# Patient Record
Sex: FEMALE | Race: WHITE | NOT HISPANIC OR LATINO | Employment: FULL TIME | URBAN - METROPOLITAN AREA
[De-identification: names, ages, dates, MRNs, and addresses within clinical notes are randomized per-mention and may not be internally consistent; named-entity substitution may affect disease eponyms.]

---

## 2020-09-23 ENCOUNTER — OFFICE VISIT (OUTPATIENT)
Dept: FAMILY MEDICINE | Facility: CLINIC | Age: 50
End: 2020-09-23
Payer: COMMERCIAL

## 2020-09-23 VITALS
DIASTOLIC BLOOD PRESSURE: 82 MMHG | WEIGHT: 165 LBS | RESPIRATION RATE: 16 BRPM | HEART RATE: 68 BPM | HEIGHT: 65 IN | BODY MASS INDEX: 27.49 KG/M2 | SYSTOLIC BLOOD PRESSURE: 128 MMHG | TEMPERATURE: 97.1 F

## 2020-09-23 DIAGNOSIS — Z13.6 CARDIOVASCULAR SCREENING; LDL GOAL LESS THAN 130: ICD-10-CM

## 2020-09-23 DIAGNOSIS — F32.0 MILD MAJOR DEPRESSION (H): ICD-10-CM

## 2020-09-23 DIAGNOSIS — N89.8 VAGINAL DISCHARGE: ICD-10-CM

## 2020-09-23 DIAGNOSIS — B96.89 BACTERIAL VAGINOSIS: ICD-10-CM

## 2020-09-23 DIAGNOSIS — N76.0 BACTERIAL VAGINOSIS: ICD-10-CM

## 2020-09-23 DIAGNOSIS — G25.81 RESTLESS LEG SYNDROME: ICD-10-CM

## 2020-09-23 DIAGNOSIS — Z00.00 ROUTINE GENERAL MEDICAL EXAMINATION AT A HEALTH CARE FACILITY: Primary | ICD-10-CM

## 2020-09-23 DIAGNOSIS — B00.1 RECURRENT COLD SORES: ICD-10-CM

## 2020-09-23 DIAGNOSIS — F41.9 ANXIETY: ICD-10-CM

## 2020-09-23 DIAGNOSIS — Z12.31 ENCOUNTER FOR SCREENING MAMMOGRAM FOR BREAST CANCER: ICD-10-CM

## 2020-09-23 DIAGNOSIS — N95.2 ATROPHIC VAGINITIS: ICD-10-CM

## 2020-09-23 LAB
CHOLEST SERPL-MCNC: 238 MG/DL
GLUCOSE SERPL-MCNC: 109 MG/DL (ref 70–99)
HDLC SERPL-MCNC: 53 MG/DL
LDLC SERPL CALC-MCNC: 144 MG/DL
NONHDLC SERPL-MCNC: 185 MG/DL
SPECIMEN SOURCE: ABNORMAL
TRIGL SERPL-MCNC: 205 MG/DL
TSH SERPL DL<=0.005 MIU/L-ACNC: 2.4 MU/L (ref 0.4–4)
WET PREP SPEC: ABNORMAL

## 2020-09-23 PROCEDURE — 99386 PREV VISIT NEW AGE 40-64: CPT | Performed by: NURSE PRACTITIONER

## 2020-09-23 PROCEDURE — 84443 ASSAY THYROID STIM HORMONE: CPT | Performed by: NURSE PRACTITIONER

## 2020-09-23 PROCEDURE — 87624 HPV HI-RISK TYP POOLED RSLT: CPT | Performed by: NURSE PRACTITIONER

## 2020-09-23 PROCEDURE — 82947 ASSAY GLUCOSE BLOOD QUANT: CPT | Performed by: NURSE PRACTITIONER

## 2020-09-23 PROCEDURE — 80061 LIPID PANEL: CPT | Performed by: NURSE PRACTITIONER

## 2020-09-23 PROCEDURE — 87210 SMEAR WET MOUNT SALINE/INK: CPT | Performed by: NURSE PRACTITIONER

## 2020-09-23 PROCEDURE — 36415 COLL VENOUS BLD VENIPUNCTURE: CPT | Performed by: NURSE PRACTITIONER

## 2020-09-23 PROCEDURE — G0145 SCR C/V CYTO,THINLAYER,RESCR: HCPCS | Performed by: NURSE PRACTITIONER

## 2020-09-23 PROCEDURE — 99213 OFFICE O/P EST LOW 20 MIN: CPT | Mod: 25 | Performed by: NURSE PRACTITIONER

## 2020-09-23 RX ORDER — VALACYCLOVIR HYDROCHLORIDE 1 G/1
2 TABLET, FILM COATED ORAL
COMMUNITY
Start: 2019-10-11 | End: 2020-09-23

## 2020-09-23 RX ORDER — CYCLOBENZAPRINE HCL 10 MG
10 TABLET ORAL
COMMUNITY
Start: 2019-10-31 | End: 2020-09-23

## 2020-09-23 RX ORDER — VALACYCLOVIR HYDROCHLORIDE 1 G/1
2000 TABLET, FILM COATED ORAL 2 TIMES DAILY
Qty: 8 TABLET | Refills: 3 | Status: SHIPPED | OUTPATIENT
Start: 2020-09-23 | End: 2021-01-27

## 2020-09-23 RX ORDER — HYDROXYZINE HYDROCHLORIDE 25 MG/1
25 TABLET, FILM COATED ORAL 3 TIMES DAILY PRN
Qty: 30 TABLET | Refills: 1 | Status: SHIPPED | OUTPATIENT
Start: 2020-09-23 | End: 2021-01-27

## 2020-09-23 RX ORDER — ESTRADIOL 0.1 MG/G
1 CREAM VAGINAL
Qty: 42.5 G | Refills: 1 | Status: SHIPPED | OUTPATIENT
Start: 2020-09-24 | End: 2021-10-08

## 2020-09-23 RX ORDER — FLUOXETINE 10 MG/1
CAPSULE ORAL
Qty: 60 CAPSULE | Refills: 3 | Status: SHIPPED | OUTPATIENT
Start: 2020-09-23 | End: 2021-01-27

## 2020-09-23 RX ORDER — VALACYCLOVIR HYDROCHLORIDE 1 G/1
2000 TABLET, FILM COATED ORAL 2 TIMES DAILY
Qty: 4 TABLET | Refills: 0 | Status: SHIPPED | OUTPATIENT
Start: 2020-09-23 | End: 2020-09-23

## 2020-09-23 RX ORDER — VALACYCLOVIR HYDROCHLORIDE 1 G/1
2 TABLET, FILM COATED ORAL
Status: CANCELLED | OUTPATIENT
Start: 2020-09-23

## 2020-09-23 RX ORDER — METRONIDAZOLE 7.5 MG/G
1 GEL VAGINAL DAILY
Qty: 70 G | Refills: 0 | Status: SHIPPED | OUTPATIENT
Start: 2020-09-23 | End: 2021-01-27

## 2020-09-23 RX ORDER — CYCLOBENZAPRINE HCL 10 MG
10 TABLET ORAL 3 TIMES DAILY PRN
Qty: 30 TABLET | Refills: 11 | Status: SHIPPED | OUTPATIENT
Start: 2020-09-23 | End: 2021-10-06

## 2020-09-23 ASSESSMENT — ENCOUNTER SYMPTOMS
DYSURIA: 0
FEVER: 0
JOINT SWELLING: 0
SHORTNESS OF BREATH: 0
HEARTBURN: 0
CONSTIPATION: 0
HEADACHES: 1
NERVOUS/ANXIOUS: 1
HEMATOCHEZIA: 0
BREAST MASS: 0
EYE PAIN: 0
WEAKNESS: 0
ABDOMINAL PAIN: 0
PARESTHESIAS: 1
FREQUENCY: 1
DIARRHEA: 0
MYALGIAS: 0
SORE THROAT: 0
NAUSEA: 0
DIZZINESS: 0
HEMATURIA: 0
PALPITATIONS: 0
CHILLS: 0
COUGH: 0
ARTHRALGIAS: 0

## 2020-09-23 ASSESSMENT — ANXIETY QUESTIONNAIRES
7. FEELING AFRAID AS IF SOMETHING AWFUL MIGHT HAPPEN: NOT AT ALL
2. NOT BEING ABLE TO STOP OR CONTROL WORRYING: NEARLY EVERY DAY
1. FEELING NERVOUS, ANXIOUS, OR ON EDGE: MORE THAN HALF THE DAYS
3. WORRYING TOO MUCH ABOUT DIFFERENT THINGS: MORE THAN HALF THE DAYS
5. BEING SO RESTLESS THAT IT IS HARD TO SIT STILL: NOT AT ALL
6. BECOMING EASILY ANNOYED OR IRRITABLE: NOT AT ALL
GAD7 TOTAL SCORE: 7

## 2020-09-23 ASSESSMENT — PATIENT HEALTH QUESTIONNAIRE - PHQ9
10. IF YOU CHECKED OFF ANY PROBLEMS, HOW DIFFICULT HAVE THESE PROBLEMS MADE IT FOR YOU TO DO YOUR WORK, TAKE CARE OF THINGS AT HOME, OR GET ALONG WITH OTHER PEOPLE: SOMEWHAT DIFFICULT
5. POOR APPETITE OR OVEREATING: NOT AT ALL
SUM OF ALL RESPONSES TO PHQ QUESTIONS 1-9: 11
SUM OF ALL RESPONSES TO PHQ QUESTIONS 1-9: 11

## 2020-09-23 ASSESSMENT — MIFFLIN-ST. JEOR: SCORE: 1364.32

## 2020-09-23 NOTE — LETTER
October 1, 2020      Sharron Shipley  [address] [address]        Dear ,    This letter is regarding your recent Pap smear and Human Papillomavirus (HPV) test.    Your results showed a normal Pap smear and HPV positive.     About 80 percent of women have been exposed to HPV throughout their lifetime. There is no medication for the treatment of HPV. Typically, your own immune system gets rid of the virus before it does harm. HPV is spread by direct skin-to-skin contact, including sexual intercourse, oral sex, anal sex, or any other contact involving the genital area (example: hand to genital contact). It is not possible to become infected with HPV by touching an object, such as a toilet seat. Most people who are infected with HPV have no signs or symptoms.    Things that you can do to boost your immune system and help your body get rid of HPV: get plenty of rest, eat a well-balanced diet of healthy foods, stop smoking, exercise regularly and decrease stress.    It is recommended that you have your next Pap smear and Human Papillomavirus (HPV) test in 1 year.    If you have additional questions regarding this result, please contact our Registered Nurse, Sarahi, at [phone].      Sincerely,    Your [facility] Care Team

## 2020-09-23 NOTE — PROGRESS NOTES
SUBJECTIVE:   CC: Sharron Shipley is an 50 year old woman who presents for preventive health visit.       Patient has been advised of split billing requirements and indicates understanding: Yes  Healthy Habits:     Getting at least 3 servings of Calcium per day:  NO    Bi-annual eye exam:  Yes    Dental care twice a year:  Yes    Sleep apnea or symptoms of sleep apnea:  None    Diet:  Regular (no restrictions)    Frequency of exercise:  None    Taking medications regularly:  Yes    Medication side effects:  Not applicable    PHQ-2 Total Score: 4    Additional concerns today:  No    Restless Legs - Using Flexeril for this. Has tired other medications that have not helped.     Cold Sore - Taking Valtrex for this. Only taking when cold sore is coming.     Vaginal discharge and dryness  Self treats for yeast infections  No pelvic pain but dryness present  No period for at least a year  Uses lubricant with intercourse  Odor present    Abnormal Mood Symptoms      Duration: 5 months     Description:  Depression: YES  Anxiety: YES  Panic attacks: no     Accompanying signs and symptoms: see PHQ-9 and CRYSTAL scores - crying a lot, unable to control emotions. Not eating well     History (similar episodes/previous evaluation): None    Precipitating or alleviating factors: Sons girlfriend took grand kids away     Therapies tried and outcome: none       Today's PHQ-2 Score:   PHQ-2 ( 1999 Pfizer) 9/23/2020   Q1: Little interest or pleasure in doing things 1   Q2: Feeling down, depressed or hopeless 3   PHQ-2 Score 4   Q1: Little interest or pleasure in doing things Several days   Q2: Feeling down, depressed or hopeless Nearly every day   PHQ-2 Score 4       Abuse: Current or Past (Physical, Sexual or Emotional) - No  Do you feel safe in your environment? Yes        Social History     Tobacco Use     Smoking status: Never Smoker     Smokeless tobacco: Never Used   Substance Use Topics     Alcohol use: Yes     Comment: rare  "        Alcohol Use 9/23/2020   Prescreen: >3 drinks/day or >7 drinks/week? No       Reviewed orders with patient.  Reviewed health maintenance and updated orders accordingly - Yes  Labs reviewed in [facility]    Mammogram Screening: Patient over age 50, mutual decision to screen reflected in health maintenance.    Pertinent mammograms are reviewed under the imaging tab.  History of abnormal Pap smear: NO - age 30-65 PAP every 5 years with negative HPV co-testing recommended     Reviewed and updated as needed this visit by clinical staff  Tobacco  Allergies  Meds  Problems  Med Hx  Surg Hx  Fam Hx  Soc Hx          Reviewed and updated as needed this visit by Provider  Tobacco  Allergies  Meds  Problems  Med Hx  Surg Hx  Fam Hx            Review of Systems   Constitutional: Negative for chills and fever.   HENT: Negative for congestion, ear pain, hearing loss and sore throat.    Eyes: Positive for visual disturbance. Negative for pain.   Respiratory: Negative for cough and shortness of breath.    Cardiovascular: Negative for chest pain, palpitations and peripheral edema.   Gastrointestinal: Negative for abdominal pain, constipation, diarrhea, heartburn, hematochezia and nausea.   Breasts:  Negative for tenderness, breast mass and discharge.   Genitourinary: Positive for frequency and vaginal discharge. Negative for dysuria, genital sores, hematuria, pelvic pain, urgency and vaginal bleeding.   Musculoskeletal: Negative for arthralgias, joint swelling and myalgias.   Skin: Positive for rash.   Neurological: Positive for headaches and paresthesias. Negative for dizziness and weakness.   Psychiatric/Behavioral: Positive for mood changes. The patient is nervous/anxious.         OBJECTIVE:   /82 (Cuff Size: Adult Regular)   Pulse 68   Temp 97.1  F (36.2  C) (Tympanic)   Resp 16   Ht 1.651 m (5' 5\")   Wt 74.8 kg (165 lb)   BMI 27.46 kg/m    Physical Exam  GENERAL APPEARANCE: healthy, alert and no " distress  EYES: Eyes grossly normal to inspection, PERRL and conjunctivae and sclerae normal  HENT: ear canals and TM's normal, nose and mouth without ulcers or lesions, oropharynx clear and oral mucous membranes moist  NECK: no adenopathy, no asymmetry, masses, or scars and thyroid normal to palpation  RESP: lungs clear to auscultation - no rales, rhonchi or wheezes  BREAST: normal without masses, tenderness or nipple discharge and no palpable axillary masses or adenopathy  CV: regular rate and rhythm, normal S1 S2, no S3 or S4, no murmur, click or rub, no peripheral edema and peripheral pulses strong  ABDOMEN: soft, nontender, no hepatosplenomegaly, no masses and bowel sounds normal   (female): normal female external genitalia, normal urethral meatus, vaginal mucosal atrophy noted, normal cervix, adnexae, and uterus without masses, scant milky discharge  MS: no musculoskeletal defects are noted and gait is age appropriate without ataxia  SKIN: no suspicious lesions or rashes  NEURO: Normal strength and tone, sensory exam grossly normal, mentation intact and speech normal  PSYCH: mentation appears normal and affect normal/bright    Diagnostic Test Results:  Labs reviewed in Epic    ASSESSMENT/PLAN:   1. Routine general medical examination at a health care facility    - GLUCOSE  - Pap imaged thin layer screen with HPV - recommended age 30 - 65  - HPV High Risk Types DNA Cervical  - TSH with free T4 reflex    2. Mild major depression (H)  Not controlled.  Will start fluoxetine for symptoms.  Potential side effects discussed including but not limited to increased risk of self harm or suicide.  Sharron verbalized understanding of risks and will follow up with any worsening symptoms.  - FLUoxetine (PROZAC) 10 MG capsule; Take 10 mg daily for 1-2 weeks then increase to 20 mg daily  Dispense: 60 capsule; Refill: 3    3. Anxiety  Not controlled. Per above. Hydroxyzine as needed for anxiety.  - FLUoxetine (PROZAC) 10 MG  "capsule; Take 10 mg daily for 1-2 weeks then increase to 20 mg daily  Dispense: 60 capsule; Refill: 3  - hydrOXYzine (ATARAX) 25 MG tablet; Take 1 tablet (25 mg) by mouth 3 times daily as needed for anxiety  Dispense: 30 tablet; Refill: 1    4. Recurrent cold sores  Valtrex refilled.   - valACYclovir (VALTREX) 1000 mg tablet; Take 2 tablets (2,000 mg) by mouth 2 times daily  Dispense: 8 tablet; Refill: 3    5. Restless leg syndrome  Stable with occasional Flexeril.   - cyclobenzaprine (FLEXERIL) 10 MG tablet; Take 1 tablet (10 mg) by mouth 3 times daily as needed for muscle spasms  Dispense: 30 tablet; Refill: 11    6. Atrophic vaginitis  Would like to treat atrophic vaginitis. Will start estradiol cream.  Risks of medication discussed including but not limited to increased risk of female cancers with use.  Sharron verbalized understanding.   - estradiol (ESTRACE) 0.1 MG/GM vaginal cream; Place 1 g vaginally twice a week  Dispense: 42.5 g; Refill: 1    7. Bacterial vaginosis  Metrogel sent to the pharmacy for BV.  Symptomatic care and follow up discussed.  - metroNIDAZOLE (METROGEL) 0.75 % vaginal gel; Place 1 applicator (5 g) vaginally daily  Dispense: 70 g; Refill: 0    8. Encounter for screening mammogram for breast cancer    - *MA Screening Digital Bilateral; Future    9. Vaginal discharge    - Wet prep    10. CARDIOVASCULAR SCREENING; LDL GOAL LESS THAN 130    - Lipid panel reflex to direct LDL Fasting    Patient has been advised of split billing requirements and indicates understanding: Yes  COUNSELING:  Reviewed preventive health counseling, as reflected in patient instructions       Regular exercise       Healthy diet/nutrition    Estimated body mass index is 27.46 kg/m  as calculated from the following:    Height as of this encounter: 1.651 m (5' 5\").    Weight as of this encounter: 74.8 kg (165 lb).      She reports that she has never smoked. She has never used smokeless tobacco.      Counseling " Resources:  ATP IV Guidelines  Pooled Cohorts Equation Calculator  Breast Cancer Risk Calculator  BRCA-Related Cancer Risk Assessment: FHS-7 Tool  FRAX Risk Assessment  ICSI Preventive Guidelines  Dietary Guidelines for Americans, 2010  USDA's MyPlate  ASA Prophylaxis  Lung CA Screening    PACHECO Britton CNP  [facility]  Answers for HPI/ROS submitted by the patient on 9/23/2020   Annual Exam:  If you checked off any problems, how difficult have these problems made it for you to do your work, take care of things at home, or get along with other people?: Somewhat difficult  PHQ9 TOTAL SCORE: 11

## 2020-09-23 NOTE — PATIENT INSTRUCTIONS
Refills sent to the pharmacy    Start the fluoxetine daily    Consider counseling     Hydroxyzine as needed for anxiety/sleep    Recheck in 1-2 months, sooner if needed-ok to do a video visit for recheck    Estradiol cream twice weekly for atropic vaginitis    Metrogel for bacterial vaginitis       Preventive Health Recommendations  Female Ages 50 - 64    Yearly exam: See your health care provider every year in order to  o Review health changes.   o Discuss preventive care.    o Review your medicines if your doctor has prescribed any.      Get a Pap test every three years (unless you have an abnormal result and your provider advises testing more often).    If you get Pap tests with HPV test, you only need to test every 5 years, unless you have an abnormal result.     You do not need a Pap test if your uterus was removed (hysterectomy) and you have not had cancer.    You should be tested each year for STDs (sexually transmitted diseases) if you're at risk.     Have a mammogram every 1 to 2 years.    Have a colonoscopy at age 50, or have a yearly FIT test (stool test). These exams screen for colon cancer.      Have a cholesterol test every 5 years, or more often if advised.    Have a diabetes test (fasting glucose) every three years. If you are at risk for diabetes, you should have this test more often.     If you are at risk for osteoporosis (brittle bone disease), think about having a bone density scan (DEXA).    Shots: Get a flu shot each year. Get a tetanus shot every 10 years.    Nutrition:     Eat at least 5 servings of fruits and vegetables each day.    Eat whole-grain bread, whole-wheat pasta and brown rice instead of white grains and rice.    Get adequate Calcium and Vitamin D.     Lifestyle    Exercise at least 150 minutes a week (30 minutes a day, 5 days a week). This will help you control your weight and prevent disease.    Limit alcohol to one drink per day.    No smoking.     Wear sunscreen to prevent  skin cancer.     See your dentist every six months for an exam and cleaning.    See your eye doctor every 1 to 2 years.

## 2020-09-23 NOTE — LETTER
September 24, 2020      Sharron Shipley  [address] [facility] [address]        Dear ,    We are writing to inform you of your test results.  cholesterol and glucose were elevated on her labs. Her glucose was 109 which would put her in the pre-diabetic range if fasting.  She needs to make lifestyle changes to prevent progression to diabetes.  Please increase physical activity to at least 30 minutes of vigorous activity 5 times per week.  Work on decreasing sugars/carbs as well as fat in your diet.  Can consider a Mediterranean diet.  Information on a Mediterranean diet can be found at   [email or web address].       Resulted Orders   GLUCOSE   Result Value Ref Range    Glucose 109 (H) 70 - 99 mg/dL      Comment:      Fasting specimen   Wet prep   Result Value Ref Range    Specimen Description Vagina     Wet Prep No yeast seen     Wet Prep Clue cells seen (A)     Wet Prep No Trichomonas seen     Wet Prep No WBC's seen    Lipid panel reflex to direct LDL Fasting   Result Value Ref Range    Cholesterol 238 (H) <200 mg/dL      Comment:      Desirable:       <200 mg/dl    Triglycerides 205 (H) <150 mg/dL      Comment:      Borderline high:  150-199 mg/dl  High:             200-499 mg/dl  Very high:       >499 mg/dl      HDL Cholesterol 53 >49 mg/dL    LDL Cholesterol Calculated 144 (H) <100 mg/dL      Comment:      Above desirable:  100-129 mg/dl  Borderline High:  130-159 mg/dL  High:             160-189 mg/dL  Very high:       >189 mg/dl      Non HDL Cholesterol 185 (H) <130 mg/dL      Comment:      Above Desirable:  130-159 mg/dl  Borderline high:  160-189 mg/dl  High:             190-219 mg/dl  Very high:       >219 mg/dl     TSH with free T4 reflex   Result Value Ref Range    TSH 2.40 0.40 - 4.00 mU/L       If you have any questions or concerns, please call the clinic at the number listed above.        Sincerely,        Lynsey Patel APRN CNP/dw

## 2020-09-24 ASSESSMENT — ANXIETY QUESTIONNAIRES: GAD7 TOTAL SCORE: 7

## 2020-09-26 LAB
COPATH REPORT: NORMAL
PAP: NORMAL

## 2020-09-29 PROBLEM — F32.0 MILD MAJOR DEPRESSION (H): Status: ACTIVE | Noted: 2020-09-29

## 2020-09-29 PROBLEM — B00.1 RECURRENT COLD SORES: Status: ACTIVE | Noted: 2020-09-29

## 2020-09-29 PROBLEM — F41.9 ANXIETY: Status: ACTIVE | Noted: 2020-09-29

## 2020-09-29 PROBLEM — G25.81 RESTLESS LEG SYNDROME: Status: ACTIVE | Noted: 2020-09-29

## 2020-09-29 LAB
FINAL DIAGNOSIS: ABNORMAL
HPV HR 12 DNA CVX QL NAA+PROBE: POSITIVE
HPV16 DNA SPEC QL NAA+PROBE: NEGATIVE
HPV18 DNA SPEC QL NAA+PROBE: NEGATIVE
SPECIMEN DESCRIPTION: ABNORMAL
SPECIMEN SOURCE CVX/VAG CYTO: ABNORMAL

## 2020-09-30 ENCOUNTER — PATIENT OUTREACH (OUTPATIENT)
Dept: FAMILY MEDICINE | Facility: CLINIC | Age: 50
End: 2020-09-30

## 2020-09-30 NOTE — TELEPHONE ENCOUNTER
2006, 2007, 2008, 2012 NIL Paps (Care Everywhere)  2017 NIL Pap, Neg HPV (Care Everywhere)  9/23/20 NIL Pap, + HR HPV (neg 16/18). Cotest due in 1 year.

## 2020-11-24 ENCOUNTER — VIRTUAL VISIT (OUTPATIENT)
Dept: FAMILY MEDICINE | Facility: CLINIC | Age: 50
End: 2020-11-24
Payer: COMMERCIAL

## 2020-11-24 DIAGNOSIS — Z20.822 EXPOSURE TO COVID-19 VIRUS: ICD-10-CM

## 2020-11-24 DIAGNOSIS — R05.9 COUGH: Primary | ICD-10-CM

## 2020-11-24 PROCEDURE — 99213 OFFICE O/P EST LOW 20 MIN: CPT | Mod: 95 | Performed by: PHYSICIAN ASSISTANT

## 2020-11-24 RX ORDER — BENZONATATE 200 MG/1
200 CAPSULE ORAL 3 TIMES DAILY PRN
Qty: 30 CAPSULE | Refills: 0 | Status: SHIPPED | OUTPATIENT
Start: 2020-11-24 | End: 2021-01-27

## 2020-11-24 RX ORDER — ALBUTEROL SULFATE 90 UG/1
2 AEROSOL, METERED RESPIRATORY (INHALATION) EVERY 6 HOURS PRN
Qty: 1 INHALER | Refills: 0 | Status: SHIPPED | OUTPATIENT
Start: 2020-11-24 | End: 2021-01-27

## 2020-11-24 RX ORDER — CODEINE PHOSPHATE AND GUAIFENESIN 10; 100 MG/5ML; MG/5ML
1-2 SOLUTION ORAL EVERY 4 HOURS PRN
Qty: 118 ML | Refills: 0 | Status: SHIPPED | OUTPATIENT
Start: 2020-11-24 | End: 2021-01-27

## 2020-11-24 NOTE — PROGRESS NOTES
"Sharron Shipley is a 50 year old female who is being evaluated via a billable video visit.      The patient has been notified of following:     \"This video visit will be conducted via a call between you and your physician/provider. We have found that certain health care needs can be provided without the need for an in-person physical exam.  This service lets us provide the care you need with a video conversation.  If a prescription is necessary we can send it directly to your pharmacy.  If lab work is needed we can place an order for that and you can then stop by our lab to have the test done at a later time.    Video visits are billed at different rates depending on your insurance coverage.  Please reach out to your insurance provider with any questions.    If during the course of the call the physician/provider feels a video visit is not appropriate, you will not be charged for this service.\"    Patient has given verbal consent for Video visit? Yes  How would you like to obtain your AVS? Mail a copy  If you are dropped from the video visit, the video invite should be resent to: Text to cell phone: [phone]  Will anyone else be joining your video visit? No    Subjective   Sharron Shipley is a 50 year old female who presents today via video visit for the following health issues:    HPI  Concern for COVID-19  About how many days ago did these symptoms start? 6 days   Is this your first visit for this illness? Yes  In the 14 days before your symptoms started, have you had close contact with someone with COVID-19 (Coronavirus)? Yes, I have been in contact with someone who has COVID-19/Coronavirus (confirmed by lab test). Cousin at moms house, had close exposure to them.   Do you have a fever or chills? Yes, I felt feverish or had chills  Are you having new or worsening difficulty breathing? No  Do you have new or worsening cough? Yes, it's a dry cough.   Have you had any new or unexplained body aches? YES  "   Have you experienced any of the following NEW symptoms?    Headache: YES    Sore throat: YES    Loss of taste or smell: No    Chest pain: tightness     Diarrhea: No    Rash: No  What treatments have you tried? Ibuprofen   Who do you live with? Just her   Are you, or a household member, a healthcare worker or a ? No  Do you live in a nursing home, group home, or shelter? No  Do you have a way to get food/medications if quarantined? Yes, I have a friend or family member who can help me.    Review of Systems   Constitutional, HEENT, cardiovascular, pulmonary, gi and gu systems are negative, except as otherwise noted.      Objective    Vitals - Patient Reported  Temperature (Patient Reported): 98.7  F (37.1  C)   Physical Exam   GENERAL: Healthy, alert and no distress  RESP: No resp distress. Occasional cough. Able to talk in full sentences.   NEURO: mentation intact  PSYCH: Mentation appears normal, affect normal/bright, judgement and insight intact, normal speech and appearance well-groomed.      Assessment & Plan   Cough  Pt with 6 days of URI symptoms consistent with Covid-19. No red flag signs or symptoms today. Able to talk in full sentences. Fevers return to normal with Tylenol. Covid-19 PCR testing ordered. Discussed prognosis, self-isolation and supportive treatment of their symptoms. Answered all questions. Call us prn for any new, changing or worsening symptoms. Warning signs and symptoms discussed on when to present to the ER.   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   - Symptomatic COVID-19 Virus (Coronavirus) by PCR; Future    Return in 1 week (on 12/1/2020).    Eugenio Lopez PA-C  [facility]    Video-Visit Details    Type of service:  Telephone Visit    Total Time: 9 mins    Originating Location (pt. Location): Home    Distant Location (provider location):  [facility]

## 2020-11-24 NOTE — PATIENT INSTRUCTIONS
Instructions for Patients  It is recommended that you have a test for coronavirus (COVID-19). This illness can cause fever, cough and trouble breathing. Many people get a mild case and get better on their own. Some people can get very sick.     Please follow these steps:    1. We will call to schedule your test.  2. A member of our care team will ask you some questions. Then, they will use a swab to collect samples from your nose and throat.     Our testing team will send you your test results.    How can I protect others?    Stay home and away from others (self-isolate) until:    You ve had no fever--and no medicine that reduces fever--for 1 full day (24 hours). And      Your other symptoms have resolved (gotten better). For example, your cough or breathing has improved. And     At least 10 days have passed since your symptoms started.    Stay at least 6 feet away from others. (If someone will drive you to your test, stay in the backseat, as far away from the  as you can.)     Don t go to work, school or anywhere else. When it s time for your test, go straight to the testing site. Don t make any stops on the way there or back.     Wash your hands and face often. Use soap and water.     Cover your mouth and nose with a mask, tissue or washcloth.     Don t touch anyone. No hugging, kissing or handshakes.    How can I take care of myself?    1. Get lots of rest. Drink extra fluids (unless a doctor has told you not to).     2. Take Tylenol (acetaminophen) for fever or pain. If you have liver or kidney problems, ask your family doctor if it's okay to take Tylenol.     Adults can take either:     650 mg (two 325 mg pills) every 4 to 6 hours, or     1,000 mg (two 500 mg pills) every 8 hours as needed.     Note: Don't take more than 3,000 mg in one day.   Acetaminophen is found in many medicines (both prescribed and over-the-counter medicines). Read all labels to be sure you don't take too much.   For children,  check the Tylenol bottle for the right dose. The dose is based on  the child's age or weight.    3. If you have other health problems (like cancer, heart failure, an organ transplant or severe kidney disease): Call your specialty clinic if you don't feel better in the next 2 days.    4. Know when to call 911: If your breathing is so bad that it keeps you from doing normal activities, call 911 or go to the emergency room. Tell them that you've been staying home and may have COVID-19.      Thank you for limiting contact with others, wearing a simple mask to cover your cough, practice good hand hygiene habits and accessing our virtual services where possible to limit the spread of this virus.    For more information about COVID19 and options for caring for yourself at home, please visit the CDC website at https://www.cdc.gov/coronavirus/2019-ncov/about/steps-when-sick.html  For more options for care at [facility], please visit our website at [email or web address]

## 2020-11-25 DIAGNOSIS — Z20.822 EXPOSURE TO COVID-19 VIRUS: ICD-10-CM

## 2020-11-25 DIAGNOSIS — R05.9 COUGH: ICD-10-CM

## 2020-11-25 PROCEDURE — U0003 INFECTIOUS AGENT DETECTION BY NUCLEIC ACID (DNA OR RNA); SEVERE ACUTE RESPIRATORY SYNDROME CORONAVIRUS 2 (SARS-COV-2) (CORONAVIRUS DISEASE [COVID-19]), AMPLIFIED PROBE TECHNIQUE, MAKING USE OF HIGH THROUGHPUT TECHNOLOGIES AS DESCRIBED BY CMS-2020-01-R: HCPCS | Performed by: PHYSICIAN ASSISTANT

## 2020-11-27 LAB
SARS-COV-2 RNA SPEC QL NAA+PROBE: NOT DETECTED
SPECIMEN SOURCE: NORMAL

## 2021-01-10 ENCOUNTER — HEALTH MAINTENANCE LETTER (OUTPATIENT)
Age: 51
End: 2021-01-10

## 2021-01-27 ENCOUNTER — OFFICE VISIT (OUTPATIENT)
Dept: FAMILY MEDICINE | Facility: CLINIC | Age: 51
End: 2021-01-27
Payer: COMMERCIAL

## 2021-01-27 VITALS
SYSTOLIC BLOOD PRESSURE: 120 MMHG | HEIGHT: 65 IN | HEART RATE: 72 BPM | WEIGHT: 159 LBS | DIASTOLIC BLOOD PRESSURE: 74 MMHG | TEMPERATURE: 97.5 F | BODY MASS INDEX: 26.49 KG/M2 | RESPIRATION RATE: 16 BRPM

## 2021-01-27 DIAGNOSIS — Z12.11 SPECIAL SCREENING FOR MALIGNANT NEOPLASMS, COLON: ICD-10-CM

## 2021-01-27 DIAGNOSIS — F32.0 MILD MAJOR DEPRESSION (H): Primary | ICD-10-CM

## 2021-01-27 DIAGNOSIS — B00.1 RECURRENT COLD SORES: ICD-10-CM

## 2021-01-27 DIAGNOSIS — F41.9 ANXIETY: ICD-10-CM

## 2021-01-27 DIAGNOSIS — H01.9 DERMATITIS OF EYELID OF LEFT EYE, UNSPECIFIED TYPE: ICD-10-CM

## 2021-01-27 PROCEDURE — 99214 OFFICE O/P EST MOD 30 MIN: CPT | Performed by: NURSE PRACTITIONER

## 2021-01-27 RX ORDER — VALACYCLOVIR HYDROCHLORIDE 500 MG/1
500 TABLET, FILM COATED ORAL DAILY
Qty: 30 TABLET | Refills: 11 | Status: SHIPPED | OUTPATIENT
Start: 2021-01-27 | End: 2022-03-02

## 2021-01-27 RX ORDER — VALACYCLOVIR HYDROCHLORIDE 1 G/1
2000 TABLET, FILM COATED ORAL 2 TIMES DAILY
Qty: 8 TABLET | Refills: 3 | Status: SHIPPED | OUTPATIENT
Start: 2021-01-27 | End: 2021-10-08

## 2021-01-27 RX ORDER — ATORVASTATIN CALCIUM 20 MG/1
20 TABLET, FILM COATED ORAL DAILY
COMMUNITY
Start: 2020-12-10 | End: 2021-04-13

## 2021-01-27 RX ORDER — FLUOXETINE 10 MG/1
CAPSULE ORAL
Qty: 60 CAPSULE | Refills: 3 | Status: SHIPPED | OUTPATIENT
Start: 2021-01-27 | End: 2021-10-06

## 2021-01-27 ASSESSMENT — ANXIETY QUESTIONNAIRES
5. BEING SO RESTLESS THAT IT IS HARD TO SIT STILL: SEVERAL DAYS
7. FEELING AFRAID AS IF SOMETHING AWFUL MIGHT HAPPEN: NOT AT ALL
3. WORRYING TOO MUCH ABOUT DIFFERENT THINGS: NOT AT ALL
GAD7 TOTAL SCORE: 3
6. BECOMING EASILY ANNOYED OR IRRITABLE: NOT AT ALL
1. FEELING NERVOUS, ANXIOUS, OR ON EDGE: SEVERAL DAYS
2. NOT BEING ABLE TO STOP OR CONTROL WORRYING: SEVERAL DAYS

## 2021-01-27 ASSESSMENT — MIFFLIN-ST. JEOR: SCORE: 1337.1

## 2021-01-27 ASSESSMENT — PATIENT HEALTH QUESTIONNAIRE - PHQ9
SUM OF ALL RESPONSES TO PHQ QUESTIONS 1-9: 7
5. POOR APPETITE OR OVEREATING: NOT AT ALL

## 2021-01-27 NOTE — PROGRESS NOTES
Assessment & Plan     Mild major depression (H)  Some improvement but not controlled.  Has not increased the fluoxetine to 20 mg, work on increasing for better control of symptoms.   - FLUoxetine (PROZAC) 10 MG capsule; Take 10 mg daily for 1-2 weeks then increase to 20 mg daily    Anxiety  Improved.   - FLUoxetine (PROZAC) 10 MG capsule; Take 10 mg daily for 1-2 weeks then increase to 20 mg daily    Recurrent cold sores  With frequent occurrence requiring treatment recommend daily dosing for prevention.   - valACYclovir (VALTREX) 1000 mg tablet; Take 2 tablets (2,000 mg) by mouth 2 times daily  - valACYclovir (VALTREX) 500 MG tablet; Take 1 tablet (500 mg) by mouth daily    Dermatitis of eyelid of left eye, unspecified type  With ongoing symptoms despite symptomatic care will refer to dermatology for further evaluation and plan.   - DERMATOLOGY ADULT REFERRAL; Future    Special screening for malignant neoplasms, colon    - Fecal colorectal cancer screen (FIT); Future    Return in about 4 weeks (around 2/24/2021), or if symptoms worsen or fail to improve.    PACHECO Britton [facility]    David Arenas is a 50 year old who presents to clinic today for the following health issues     HPI     Rash  Onset/Duration: 3-4 ann s  Description  Location: left eye- creases of eyes  Character: raised, flakey  Itching: no  Intensity:  moderate  Progression of Symptoms:  same  Accompanying signs and symptoms:   Fever: no  Body aches or joint pain: no  Sore throat symptoms: no  Recent cold symptoms: no  History:           Previous episodes of similar rash: yes   New exposures:  None  Recent travel: no  Exposure to similar rash: no  Precipitating or alleviating factors: none   Therapies tried and outcome: hydrocortisone cream -  Not effective, lotion    Depression and Anxiety Follow-Up    How are you doing with your depression since your last visit? Improved  - not crying as much  "    How are you doing with your anxiety since your last visit?  Improved     Are you having other symptoms that might be associated with depression or anxiety? Yes:  no energy    Have you had a significant life event? No     Do you have any concerns with your use of alcohol or other drugs? No    Social History     Tobacco Use     Smoking status: Never Smoker     Smokeless tobacco: Never Used   Substance Use Topics     Alcohol use: Not Currently     Comment: rare     Drug use: Never     PHQ 9/23/2020 9/23/2020 1/27/2021   PHQ-9 Total Score 11 11 7   Q9: Thoughts of better off dead/self-harm past 2 weeks Not at all Not at all Not at all     CRYSTAL-7 SCORE 9/23/2020 1/27/2021   Total Score 7 3       Suicide Assessment Five-step Evaluation and Treatment (SAFE-T)      How many servings of fruits and vegetables do you eat daily?  0-1    On average, how many sweetened beverages do you drink each day (Examples: soda, juice, sweet tea, etc.  Do NOT count diet or artificially sweetened beverages)?   3    How many days per week do you exercise enough to make your heart beat faster? 4    How many minutes a day do you exercise enough to make your heart beat faster? 9 or less    How many days per week do you miss taking your medication? 0    Medication Followup of valtrex     Taking Medication as prescribed: yes    Side Effects:  None    Medication Helping Symptoms:  Yes    Would like increased supply due to having more breakouts per month       Having 2-3 outbreaks a month      Review of Systems   Constitutional, HEENT, cardiovascular, pulmonary, gi and gu systems are negative, except as otherwise noted.      Objective    /74 (Cuff Size: Adult Regular)   Pulse 72   Temp 97.5  F (36.4  C) (Tympanic)   Resp 16   Ht 1.651 m (5' 5\")   Wt 72.1 kg (159 lb)   BMI 26.46 kg/m    Body mass index is 26.46 kg/m .  Physical Exam   GENERAL: healthy, alert and no distress  SKIN: erythema and scaling present left lid  PSYCH: mentation " appears normal, affect normal/bright

## 2021-01-27 NOTE — PATIENT INSTRUCTIONS
Dermatology referral placed     Start the Valtrex daily due to recurrent outbreaks    Try to increase the Fluoxetine to 20 mg daily     Take vitamin D 3 in the morning 1,000-2,000 international unit(s) daily

## 2021-01-28 ASSESSMENT — ANXIETY QUESTIONNAIRES: GAD7 TOTAL SCORE: 3

## 2021-02-23 NOTE — PROGRESS NOTES
Assessment & Plan     Epigastric pain  49 yo female in no acute distress with past medical history of anxiety and mild depression currently taking prozac and reporting increasing work and home stressors presents to the clinic with acute 3-week onset dysphagia felt as food stuck lower esophagus near stomach (beneath sternum near epigastric area) and epigastric pain.  She states she gets the dysphagia sensation after she swallows with solid foods. She is tolerating liquids. She reports feeling a full sensation or feeling that she needs to burp. She denies weight loss but states she feels like she is not able to eat. She has been eating mostly protein drinks for nourishment. She has not tried any antacid for symptoms. Will plan to start trial of omeprazole to decrease stomach irritation. If omeprazole does not decrease symptoms by one week, patient was instructed to call to schedule upper endoscopy for further evaluation of dysphagia symptoms.   Symptomatic care and follow up discussed    - omeprazole (PRILOSEC) 20 MG DR capsule; Take 1 capsule (20 mg) by mouth daily for 14 days  - GASTROENTEROLOGY ADULT REF PROCEDURE ONLY; Future    Dysphagia, unspecified type  See above  - omeprazole (PRILOSEC) 20 MG DR capsule; Take 1 capsule (20 mg) by mouth daily for 14 days  - GASTROENTEROLOGY ADULT REF PROCEDURE ONLY; Future      Return in about 1 week (around 3/3/2021), or if symptoms worsen or fail to improve.    PACHECO Britton CNP  [facility]    David Arenas is a 50 year old who presents for the following health issues     HPI       GI Problem   Onset/Duration: 3 weeks   Description: feeling like food is getting stuck   Intensity: moderate  Progression of Symptoms: worsening  Accompanying Signs & Symptoms:  Does it feel like food gets stuck or trouble swallowing: YES - epigastric region   Nausea: no  Vomiting (bloody?): no  Abdominal Pain: YES - epigastric   Black-Tarry stools:  "no  Bloody stools: no  History:  Previous similar episodes: no  Previous ulcers: no  Precipitating factors:   Caffeine use: YES  Alcohol use: no  NSAID/Aspirin use: no  Tobacco use: no  Worse with varies  Alleviating factors: None  Therapies tried and outcome:            Medications: none    Review of Systems   CONSTITUTIONAL: NEGATIVE for fever, chills, change in weight  ENT/MOUTH: NEGATIVE for ear, mouth and throat problems  RESP: NEGATIVE for significant cough or SOB  CV: NEGATIVE for chest pain, palpitations or peripheral edema  GI: POSITIVE for dysphagia      Objective    /82   Pulse 72   Temp 98.4  F (36.9  C) (Tympanic)   Resp 16   Ht 1.651 m (5' 5\")   Wt 72.6 kg (160 lb)   BMI 26.63 kg/m    Body mass index is 26.63 kg/m .  Physical Exam   GENERAL: healthy, alert and no distress  NECK: no adenopathy, no asymmetry, masses, or scars and thyroid normal to palpation  RESP: lungs clear to auscultation - no rales, rhonchi or wheezes  CV: regular rate and rhythm, normal S1 S2, no S3 or S4, no murmur, click or rub, no peripheral edema and peripheral pulses strong  ABDOMEN: tenderness epigastric, no organomegaly or masses and bowel sounds normal  MS: no gross musculoskeletal defects noted, no edema          "

## 2021-02-24 ENCOUNTER — OFFICE VISIT (OUTPATIENT)
Dept: FAMILY MEDICINE | Facility: CLINIC | Age: 51
End: 2021-02-24
Payer: COMMERCIAL

## 2021-02-24 VITALS
BODY MASS INDEX: 26.66 KG/M2 | TEMPERATURE: 98.4 F | HEART RATE: 72 BPM | WEIGHT: 160 LBS | RESPIRATION RATE: 16 BRPM | SYSTOLIC BLOOD PRESSURE: 138 MMHG | HEIGHT: 65 IN | DIASTOLIC BLOOD PRESSURE: 82 MMHG

## 2021-02-24 DIAGNOSIS — R10.13 EPIGASTRIC PAIN: Primary | ICD-10-CM

## 2021-02-24 DIAGNOSIS — Z12.11 SPECIAL SCREENING FOR MALIGNANT NEOPLASMS, COLON: ICD-10-CM

## 2021-02-24 DIAGNOSIS — R13.10 DYSPHAGIA, UNSPECIFIED TYPE: ICD-10-CM

## 2021-02-24 PROCEDURE — 99214 OFFICE O/P EST MOD 30 MIN: CPT | Performed by: NURSE PRACTITIONER

## 2021-02-24 PROCEDURE — 82274 ASSAY TEST FOR BLOOD FECAL: CPT | Performed by: NURSE PRACTITIONER

## 2021-02-24 ASSESSMENT — MIFFLIN-ST. JEOR: SCORE: 1341.64

## 2021-02-24 NOTE — PATIENT INSTRUCTIONS
[facility] Schedule  [place] ~ [phone]  Every other Monday or Wednesday   & one Saturday morning a month    [place]  Every Other Monday Morning    [place] ~ [phone]  Every Monday morning  Every Tuesday afternoon  Wed, Thurs, Friday morning & afternoon    Start the omeprazole daily for two weeks     Endoscopy referral placed    Follow up if symptoms do not improve or worsen.

## 2021-02-27 LAB — HEMOCCULT STL QL IA: NEGATIVE

## 2021-03-01 ENCOUNTER — VIRTUAL VISIT (OUTPATIENT)
Dept: FAMILY MEDICINE | Facility: CLINIC | Age: 51
End: 2021-03-01
Payer: COMMERCIAL

## 2021-03-01 DIAGNOSIS — Z20.822 EXPOSURE TO COVID-19 VIRUS: Primary | ICD-10-CM

## 2021-03-01 PROCEDURE — 99213 OFFICE O/P EST LOW 20 MIN: CPT | Mod: 95 | Performed by: FAMILY MEDICINE

## 2021-03-01 NOTE — PATIENT INSTRUCTIONS
Instructions for Patients  It is recommended that you have a test for coronavirus (COVID-19). This illness can cause fever, cough and trouble breathing. Many people get a mild case and get better on their own. Some people can get very sick.     Please follow these steps:    1. We will call to schedule your test.  2. A member of our care team will ask you some questions. Then, they will use a swab to collect samples from your nose and throat.     Our testing team will send you your test results.    How can I protect others?    Stay home and away from others (self-isolate) until:    You ve had no fever--and no medicine that reduces fever--for 1 full day (24 hours). And      Your other symptoms have resolved (gotten better). For example, your cough or breathing has improved. And     At least 10 days have passed since your symptoms started.    Stay at least 6 feet away from others. (If someone will drive you to your test, stay in the backseat, as far away from the  as you can.)     Don t go to work, school or anywhere else. When it s time for your test, go straight to the testing site. Don t make any stops on the way there or back.     Wash your hands and face often. Use soap and water.     Cover your mouth and nose with a mask, tissue or washcloth.     Don t touch anyone. No hugging, kissing or handshakes.    How can I take care of myself?    1. Get lots of rest. Drink extra fluids (unless a doctor has told you not to).     2. Take Tylenol (acetaminophen) for fever or pain. If you have liver or kidney problems, ask your family doctor if it's okay to take Tylenol.     Adults can take either:     650 mg (two 325 mg pills) every 4 to 6 hours, or     1,000 mg (two 500 mg pills) every 8 hours as needed.     Note: Don't take more than 3,000 mg in one day.   Acetaminophen is found in many medicines (both prescribed and over-the-counter medicines). Read all labels to be sure you don't take too much.   For children,  check the Tylenol bottle for the right dose. The dose is based on  the child's age or weight.    3. If you have other health problems (like cancer, heart failure, an organ transplant or severe kidney disease): Call your specialty clinic if you don't feel better in the next 2 days.    4. Know when to call 911: If your breathing is so bad that it keeps you from doing normal activities, call 911 or go to the emergency room. Tell them that you've been staying home and may have COVID-19.      Thank you for limiting contact with others, wearing a simple mask to cover your cough, practice good hand hygiene habits and accessing our virtual services where possible to limit the spread of this virus.    For more information about COVID19 and options for caring for yourself at home, please visit the CDC website at https://www.cdc.gov/coronavirus/2019-ncov/about/steps-when-sick.html  For more options for care at [facility], please visit our website at [email or web address]

## 2021-03-02 DIAGNOSIS — Z20.822 EXPOSURE TO COVID-19 VIRUS: ICD-10-CM

## 2021-03-02 LAB
SARS-COV-2 RNA RESP QL NAA+PROBE: NORMAL
SPECIMEN SOURCE: NORMAL

## 2021-03-02 PROCEDURE — U0005 INFEC AGEN DETEC AMPLI PROBE: HCPCS | Performed by: FAMILY MEDICINE

## 2021-03-02 PROCEDURE — U0003 INFECTIOUS AGENT DETECTION BY NUCLEIC ACID (DNA OR RNA); SEVERE ACUTE RESPIRATORY SYNDROME CORONAVIRUS 2 (SARS-COV-2) (CORONAVIRUS DISEASE [COVID-19]), AMPLIFIED PROBE TECHNIQUE, MAKING USE OF HIGH THROUGHPUT TECHNOLOGIES AS DESCRIBED BY CMS-2020-01-R: HCPCS | Performed by: FAMILY MEDICINE

## 2021-03-03 LAB
LABORATORY COMMENT REPORT: NORMAL
SARS-COV-2 RNA RESP QL NAA+PROBE: NEGATIVE
SPECIMEN SOURCE: NORMAL

## 2021-03-12 ENCOUNTER — E-VISIT (OUTPATIENT)
Dept: FAMILY MEDICINE | Facility: CLINIC | Age: 51
End: 2021-03-12
Payer: COMMERCIAL

## 2021-03-12 DIAGNOSIS — R05.9 COUGH: Primary | ICD-10-CM

## 2021-03-12 DIAGNOSIS — Z20.822 EXPOSURE TO COVID-19 VIRUS: ICD-10-CM

## 2021-03-12 PROCEDURE — 99421 OL DIG E/M SVC 5-10 MIN: CPT | Performed by: PHYSICIAN ASSISTANT

## 2021-03-12 NOTE — PATIENT INSTRUCTIONS
Instructions for Patients  It is recommended that you have a test for coronavirus (COVID-19). This illness can cause fever, cough and trouble breathing. Many people get a mild case and get better on their own. Some people can get very sick.     Please follow these steps:    1. We will call to schedule your test.  2. A member of our care team will ask you some questions. Then, they will use a swab to collect samples from your nose and throat.     Our testing team will send you your test results.    How can I protect others?    Stay home and away from others (self-isolate) until:    You ve had no fever--and no medicine that reduces fever--for 1 full day (24 hours). And      Your other symptoms have resolved (gotten better). For example, your cough or breathing has improved. And     At least 10 days have passed since your symptoms started.    Stay at least 6 feet away from others. (If someone will drive you to your test, stay in the backseat, as far away from the  as you can.)     Don t go to work, school or anywhere else. When it s time for your test, go straight to the testing site. Don t make any stops on the way there or back.     Wash your hands and face often. Use soap and water.     Cover your mouth and nose with a mask, tissue or washcloth.     Don t touch anyone. No hugging, kissing or handshakes.    How can I take care of myself?    1. Get lots of rest. Drink extra fluids (unless a doctor has told you not to).     2. Take Tylenol (acetaminophen) for fever or pain. If you have liver or kidney problems, ask your family doctor if it's okay to take Tylenol.     Adults can take either:     650 mg (two 325 mg pills) every 4 to 6 hours, or     1,000 mg (two 500 mg pills) every 8 hours as needed.     Note: Don't take more than 3,000 mg in one day.   Acetaminophen is found in many medicines (both prescribed and over-the-counter medicines). Read all labels to be sure you don't take too much.   For children,  check the Tylenol bottle for the right dose. The dose is based on  the child's age or weight.    3. If you have other health problems (like cancer, heart failure, an organ transplant or severe kidney disease): Call your specialty clinic if you don't feel better in the next 2 days.    4. Know when to call 911: If your breathing is so bad that it keeps you from doing normal activities, call 911 or go to the emergency room. Tell them that you've been staying home and may have COVID-19.      Thank you for limiting contact with others, wearing a simple mask to cover your cough, practice good hand hygiene habits and accessing our virtual services where possible to limit the spread of this virus.    For more information about COVID19 and options for caring for yourself at home, please visit the CDC website at https://www.cdc.gov/coronavirus/2019-ncov/about/steps-when-sick.html  For more options for care at [facility], please visit our website at [email or web address]

## 2021-03-14 DIAGNOSIS — R05.9 COUGH: ICD-10-CM

## 2021-03-14 DIAGNOSIS — Z20.822 EXPOSURE TO COVID-19 VIRUS: ICD-10-CM

## 2021-03-14 LAB
SARS-COV-2 RNA RESP QL NAA+PROBE: NORMAL
SPECIMEN SOURCE: NORMAL

## 2021-03-14 PROCEDURE — U0003 INFECTIOUS AGENT DETECTION BY NUCLEIC ACID (DNA OR RNA); SEVERE ACUTE RESPIRATORY SYNDROME CORONAVIRUS 2 (SARS-COV-2) (CORONAVIRUS DISEASE [COVID-19]), AMPLIFIED PROBE TECHNIQUE, MAKING USE OF HIGH THROUGHPUT TECHNOLOGIES AS DESCRIBED BY CMS-2020-01-R: HCPCS | Performed by: PHYSICIAN ASSISTANT

## 2021-03-14 PROCEDURE — U0005 INFEC AGEN DETEC AMPLI PROBE: HCPCS | Performed by: PHYSICIAN ASSISTANT

## 2021-03-14 NOTE — LETTER
March 16, 2021      Sharron KANG Quinten  [address] [address]        Dear ,    We are writing to inform you of your test results.    Your test results fall within the expected range(s) or remain unchanged from previous results.  Please continue with current treatment plan.    Resulted Orders   Symptomatic COVID-19 Virus (Coronavirus) by PCR   Result Value Ref Range    COVID-19 Virus PCR to [facility] - Source Nasopharyngeal     COVID-19 Virus PCR to [facility] - Result       Test received-See reflex to [facility] test SARS CoV2 (COVID-19) Virus RT-PCR   SARS-CoV-2 COVID-19 Virus (Coronavirus) by PCR   Result Value Ref Range    SARS-CoV-2 Virus Specimen Source Nasopharyngeal     SARS-CoV-2 PCR Result NEGATIVE       Comment:      SARS-CoV2 (COVID-19) RNA not detected, presumed negative.    SARS-CoV-2 PCR Comment       Testing was performed using the [employer] X[employer] SARS-CoV-2 Assay on the Cepheid Gene-Xpert   Instrument Systems. Additional information about this Emergency Use Authorization (EUA)   assay can be found via the Lab Guide.        Comment:      This test should be ordered for the detection of SARS-CoV-2 in individuals who   meet SARS-CoV-2 clinical and/or epidemiological criteria. Test performance is   unknown in asymptomatic patients.  This test is for in vitro diagnostic use under the FDA EUA for laboratories   certified under CLIA to perform high complexity testing. This test has not   been FDA cleared or approved.  A negative result does not rule out the presence of PCR inhibitors in the   specimen or target RNA in concentration below the limit of detection for the   assay. The possibility of a false negative should be considered if the   patient's recent exposure or clinical presentation suggests COVID-19.  This test was validated by the [facility]. This laboratory is certified under the Clinical   Laboratory Improvement Amendments of 1988  (CLIA-88) as qualified to perform   high complexity laboratory testing.         If you have any questions or concerns, please call the clinic at the number listed above.       Sincerely,      Eugenio Lopez PA-C

## 2021-03-15 ENCOUNTER — TELEPHONE (OUTPATIENT)
Dept: FAMILY MEDICINE | Facility: CLINIC | Age: 51
End: 2021-03-15

## 2021-03-15 NOTE — TELEPHONE ENCOUNTER
Left message for patient to return call to schedule EGD/colonoscopy. If Shabana or Zonia are not available, please transfer to same day surgery

## 2021-03-15 NOTE — LETTER
[address] [address]  [phone]      March 22, 2021      Sharron Shipley  [address] [place] [address]      Dear Sharron:     To better serve you, we are sending this letter to notify you that we have attempted to contact you by telephone to schedule the following procedure(s) ordered by your physician.     _______   Colonoscopy   ____x___   Upper GI Endoscopy (EGD)   _______   Colonoscopy and Upper GI Endoscopy    To provide the highest quality of care, we strongly encourage you to call and schedule the prescribed test/procedure at your earliest convenience.   The number to the Specialty Scheduling department is [phone] and the hours are 8:00am - 4:30pm Monday through Friday.   We look forward to hearing from you.    Sincerely,    [facility]

## 2021-03-22 NOTE — TELEPHONE ENCOUNTER
Left message for patient to return call to schedule EGD/colonoscopy. If Shabana or Zonia are not available, please transfer to same day surgery        x2 letter sent

## 2021-04-13 DIAGNOSIS — Z13.6 CARDIOVASCULAR SCREENING; LDL GOAL LESS THAN 130: Primary | ICD-10-CM

## 2021-04-13 RX ORDER — ATORVASTATIN CALCIUM 20 MG/1
20 TABLET, FILM COATED ORAL DAILY
Qty: 90 TABLET | Refills: 1 | Status: SHIPPED | OUTPATIENT
Start: 2021-04-13 | End: 2021-10-08

## 2021-05-03 DIAGNOSIS — Z11.59 ENCOUNTER FOR SCREENING FOR OTHER VIRAL DISEASES: ICD-10-CM

## 2021-05-06 ENCOUNTER — ANESTHESIA EVENT (OUTPATIENT)
Dept: GASTROENTEROLOGY | Facility: CLINIC | Age: 51
End: 2021-05-06
Payer: COMMERCIAL

## 2021-05-06 NOTE — ANESTHESIA PREPROCEDURE EVALUATION
Anesthesia Pre-Procedure Evaluation    Patient: Sharron Shipley   MRN: [id number] : 1970        Preoperative Diagnosis: Epigastric pain [R10.13]  Dysphagia, unspecified type [R13.10]   Procedure : Procedure(s):  ESOPHAGOGASTRODUODENOSCOPY (EGD)     Past Medical History:   Diagnosis Date     Cervical high risk HPV (human papillomavirus) test positive     see problem list      No past surgical history on file.   No Known Allergies   Social History     Tobacco Use     Smoking status: Never Smoker     Smokeless tobacco: Never Used   Substance Use Topics     Alcohol use: Not Currently     Comment: rare      Wt Readings from Last 1 Encounters:   21 72.6 kg (160 lb)        Anesthesia Evaluation   Pt has had prior anesthetic.     No history of anesthetic complications       ROS/MED HX  ENT/Pulmonary:  - neg pulmonary ROS     Neurologic: Comment: Restless leg      Cardiovascular:  - neg cardiovascular ROS     METS/Exercise Tolerance: >4 METS    Hematologic:  - neg hematologic  ROS     Musculoskeletal:  - neg musculoskeletal ROS     GI/Hepatic: Comment: Dysphagia, epigastric pain      Renal/Genitourinary:  - neg Renal ROS     Endo:  - neg endo ROS     Psychiatric/Substance Use:     (+) psychiatric history anxiety and depression     Infectious Disease:  - neg infectious disease ROS     Malignancy:  - neg malignancy ROS     Other:  - neg other ROS          Physical Exam    Airway        Mallampati: II   TM distance: > 3 FB   Neck ROM: full   Mouth opening: > 3 cm    Respiratory Devices and Support         Dental  no notable dental history         Cardiovascular   cardiovascular exam normal          Pulmonary   pulmonary exam normal                OUTSIDE LABS:  CBC: No results found for: WBC, HGB, HCT, PLT  BMP:   Lab Results   Component Value Date     (H) 2020     COAGS: No results found for: PTT, INR, FIBR  POC: No results found for: BGM, HCG, HCGS  HEPATIC: No results found for: ALBUMIN,  PROTTOTAL, ALT, AST, GGT, ALKPHOS, BILITOTAL, BILIDIRECT, RILEY  OTHER:   Lab Results   Component Value Date    TSH 2.40 09/23/2020       Anesthesia Plan    ASA Status:  2   NPO Status:  NPO Appropriate    Anesthesia Type: General.   Induction: Intravenous, Propofol.   Maintenance: TIVA.        Consents    Anesthesia Plan(s) and associated risks, benefits, and realistic alternatives discussed. Questions answered and patient/representative(s) expressed understanding.     - Discussed with:  Patient         Postoperative Care    Pain management: IV analgesics, Oral pain medications.   PONV prophylaxis: Ondansetron (or other 5HT-3), Dexamethasone or Solumedrol     Comments:                PACHECO Santos CRNA

## 2021-05-07 DIAGNOSIS — Z11.59 ENCOUNTER FOR SCREENING FOR OTHER VIRAL DISEASES: ICD-10-CM

## 2021-05-07 LAB
SARS-COV-2 RNA RESP QL NAA+PROBE: NORMAL
SPECIMEN SOURCE: NORMAL

## 2021-05-07 PROCEDURE — U0003 INFECTIOUS AGENT DETECTION BY NUCLEIC ACID (DNA OR RNA); SEVERE ACUTE RESPIRATORY SYNDROME CORONAVIRUS 2 (SARS-COV-2) (CORONAVIRUS DISEASE [COVID-19]), AMPLIFIED PROBE TECHNIQUE, MAKING USE OF HIGH THROUGHPUT TECHNOLOGIES AS DESCRIBED BY CMS-2020-01-R: HCPCS | Performed by: SURGERY

## 2021-05-07 PROCEDURE — U0005 INFEC AGEN DETEC AMPLI PROBE: HCPCS | Performed by: SURGERY

## 2021-05-10 ENCOUNTER — HOSPITAL ENCOUNTER (OUTPATIENT)
Facility: CLINIC | Age: 51
Discharge: HOME | End: 2021-05-10
Attending: SURGERY | Admitting: SURGERY
Payer: COMMERCIAL

## 2021-05-10 ENCOUNTER — ANESTHESIA (OUTPATIENT)
Dept: GASTROENTEROLOGY | Facility: CLINIC | Age: 51
End: 2021-05-10
Payer: COMMERCIAL

## 2021-05-10 VITALS
SYSTOLIC BLOOD PRESSURE: 145 MMHG | HEIGHT: 65 IN | RESPIRATION RATE: 18 BRPM | WEIGHT: 160 LBS | OXYGEN SATURATION: 91 % | DIASTOLIC BLOOD PRESSURE: 99 MMHG | TEMPERATURE: 97.8 F | HEART RATE: 88 BPM | BODY MASS INDEX: 26.66 KG/M2

## 2021-05-10 LAB — UPPER GI ENDOSCOPY: NORMAL

## 2021-05-10 PROCEDURE — 43245 EGD DILATE STRICTURE: CPT | Performed by: SURGERY

## 2021-05-10 PROCEDURE — 370N000017 HC ANESTHESIA TECHNICAL FEE, PER MIN: Performed by: SURGERY

## 2021-05-10 PROCEDURE — 258N000003 HC RX IP 258 OP 636: Performed by: SURGERY

## 2021-05-10 PROCEDURE — 250N000009 HC RX 250: Performed by: NURSE ANESTHETIST, CERTIFIED REGISTERED

## 2021-05-10 PROCEDURE — 43249 ESOPH EGD DILATION <30 MM: CPT | Performed by: SURGERY

## 2021-05-10 PROCEDURE — 250N000009 HC RX 250: Performed by: SURGERY

## 2021-05-10 PROCEDURE — 250N000011 HC RX IP 250 OP 636: Performed by: NURSE ANESTHETIST, CERTIFIED REGISTERED

## 2021-05-10 RX ORDER — SODIUM CHLORIDE, SODIUM LACTATE, POTASSIUM CHLORIDE, CALCIUM CHLORIDE 600; 310; 30; 20 MG/100ML; MG/100ML; MG/100ML; MG/100ML
INJECTION, SOLUTION INTRAVENOUS CONTINUOUS
Status: DISCONTINUED | OUTPATIENT
Start: 2021-05-10 | End: 2021-05-10 | Stop reason: HOSPADM

## 2021-05-10 RX ORDER — PROPOFOL 10 MG/ML
INJECTION, EMULSION INTRAVENOUS PRN
Status: DISCONTINUED | OUTPATIENT
Start: 2021-05-10 | End: 2021-05-10

## 2021-05-10 RX ORDER — PROPOFOL 10 MG/ML
INJECTION, EMULSION INTRAVENOUS CONTINUOUS PRN
Status: DISCONTINUED | OUTPATIENT
Start: 2021-05-10 | End: 2021-05-10

## 2021-05-10 RX ORDER — ONDANSETRON 2 MG/ML
4 INJECTION INTRAMUSCULAR; INTRAVENOUS
Status: DISCONTINUED | OUTPATIENT
Start: 2021-05-10 | End: 2021-05-10 | Stop reason: HOSPADM

## 2021-05-10 RX ORDER — LIDOCAINE 40 MG/G
CREAM TOPICAL
Status: DISCONTINUED | OUTPATIENT
Start: 2021-05-10 | End: 2021-05-10 | Stop reason: HOSPADM

## 2021-05-10 RX ORDER — GLYCOPYRROLATE 0.2 MG/ML
INJECTION, SOLUTION INTRAMUSCULAR; INTRAVENOUS PRN
Status: DISCONTINUED | OUTPATIENT
Start: 2021-05-10 | End: 2021-05-10

## 2021-05-10 RX ORDER — LIDOCAINE HYDROCHLORIDE 10 MG/ML
INJECTION, SOLUTION EPIDURAL; INFILTRATION; INTRACAUDAL; PERINEURAL PRN
Status: DISCONTINUED | OUTPATIENT
Start: 2021-05-10 | End: 2021-05-10

## 2021-05-10 RX ADMIN — LIDOCAINE HYDROCHLORIDE 0.1 ML: 10 INJECTION, SOLUTION EPIDURAL; INFILTRATION; INTRACAUDAL; PERINEURAL at 10:26

## 2021-05-10 RX ADMIN — PROPOFOL 100 MG: 10 INJECTION, EMULSION INTRAVENOUS at 10:56

## 2021-05-10 RX ADMIN — GLYCOPYRROLATE 0.2 MG: 0.2 INJECTION, SOLUTION INTRAMUSCULAR; INTRAVENOUS at 10:56

## 2021-05-10 RX ADMIN — LIDOCAINE HYDROCHLORIDE 5 ML: 10 INJECTION, SOLUTION EPIDURAL; INFILTRATION; INTRACAUDAL; PERINEURAL at 10:56

## 2021-05-10 RX ADMIN — SODIUM CHLORIDE, POTASSIUM CHLORIDE, SODIUM LACTATE AND CALCIUM CHLORIDE 1000 ML: 600; 310; 30; 20 INJECTION, SOLUTION INTRAVENOUS at 10:26

## 2021-05-10 RX ADMIN — PROPOFOL 200 MCG/KG/MIN: 10 INJECTION, EMULSION INTRAVENOUS at 10:56

## 2021-05-10 ASSESSMENT — MIFFLIN-ST. JEOR: SCORE: 1341.64

## 2021-05-10 NOTE — H&P
50 year old year old female here for upper endoscopy for dysphagia.        Patient Active Problem List   Diagnosis     Mild major depression (H)     Anxiety     Recurrent cold sores     Restless leg syndrome     Cervical high risk HPV (human papillomavirus) test positive       Past Medical History:   Diagnosis Date     Cervical high risk HPV (human papillomavirus) test positive 2020    see problem list       No past surgical history on file.    Family History   Problem Relation Age of Onset     Diabetes Mother         type 2     Hyperlipidemia Mother      Sleep Apnea Father      Diabetes Son         type 1     Attention Deficit Disorder Son      Attention Deficit Disorder Daughter      Diabetes Son        No current outpatient medications on file.       No Known Allergies    Pt reports that she has never smoked. She has never used smokeless tobacco. She reports previous alcohol use. She reports that she does not use drugs.    Exam:    Awake, Alert OX3  Lungs - CTA bilaterally  CV - RRR, no murmurs, distal pulses intact  Abd - soft, non-distended, non-tender, +BS  Extr - No cyanosis or edema    A/P 50 year old year old female in need of upper endoscopy for dysphagia. Risks, benefits, alternatives, and complications were discussed including the possibility of perforation and the patient agreed to proceed.    Valentin Taylor MD

## 2021-05-10 NOTE — ANESTHESIA CARE TRANSFER NOTE
Patient: Sharron Shipley    Procedure(s):  ESOPHAGOGASTRODUODENOSCOPY, WITH DILATION of lower esophageal sphincter    Diagnosis: Epigastric pain [R10.13]  Dysphagia, unspecified type [R13.10]  Diagnosis Additional Information: No value filed.    Anesthesia Type:   General     Note:    Oropharynx: spontaneously breathing  Level of Consciousness: drowsy  Oxygen Supplementation: nasal cannula  Level of Supplemental Oxygen (L/min / FiO2): 2  Independent Airway: airway patency satisfactory and stable  Dentition: dentition unchanged  Vital Signs Stable: post-procedure vital signs reviewed and stable  Report to RN Given: handoff report given  Patient transferred to: Phase II    Handoff Report: Identifed the Patient, Identified the Reponsible Provider, Reviewed the pertinent medical history, Discussed the surgical course, Reviewed Intra-OP anesthesia mangement and issues during anesthesia, Set expectations for post-procedure period and Allowed opportunity for questions and acknowledgement of understanding      Vitals: (Last set prior to Anesthesia Care Transfer)  CRNA VITALS  5/10/2021 1037 - 5/10/2021 1107      5/10/2021             Pulse:  102    SpO2:  100 %        Electronically Signed By: PACHECO Alexander CRNA  May 10, 2021  11:07 AM

## 2021-05-13 ENCOUNTER — VIRTUAL VISIT (OUTPATIENT)
Dept: FAMILY MEDICINE | Facility: CLINIC | Age: 51
End: 2021-05-13
Payer: COMMERCIAL

## 2021-05-13 DIAGNOSIS — R05.9 COUGH: Primary | ICD-10-CM

## 2021-05-13 PROCEDURE — 99213 OFFICE O/P EST LOW 20 MIN: CPT | Mod: 95 | Performed by: PHYSICIAN ASSISTANT

## 2021-05-13 RX ORDER — ALBUTEROL SULFATE 90 UG/1
2 AEROSOL, METERED RESPIRATORY (INHALATION) EVERY 6 HOURS PRN
Qty: 8.5 G | Refills: 0 | Status: SHIPPED | OUTPATIENT
Start: 2021-05-13 | End: 2021-10-08

## 2021-05-13 RX ORDER — BENZONATATE 200 MG/1
200 CAPSULE ORAL 3 TIMES DAILY PRN
Qty: 60 CAPSULE | Refills: 0 | Status: SHIPPED | OUTPATIENT
Start: 2021-05-13 | End: 2022-05-09

## 2021-05-13 RX ORDER — CODEINE PHOSPHATE AND GUAIFENESIN 10; 100 MG/5ML; MG/5ML
1-2 SOLUTION ORAL EVERY 4 HOURS PRN
Qty: 118 ML | Refills: 0 | Status: SHIPPED | OUTPATIENT
Start: 2021-05-13 | End: 2021-10-08

## 2021-05-13 NOTE — PATIENT INSTRUCTIONS
Continue all current OTC medications.     Rx for Albuterol, Tessalon perles and Robitussin-AC were sent to the pharmacy for you.     Follow-up in 1-2 weeks for any new, changing or worsening symptoms.

## 2021-05-13 NOTE — PROGRESS NOTES
"Dagoberto is a 50 year old who is being evaluated via a billable telephone visit.      What phone number would you like to be contacted at? [phone]  How would you like to obtain your AVS? MyChart    Assessment & Plan   Cough  3 days of a cough and other URI symptoms. Pt had Covid test 4-5 days ago which was negative. No known exposure. No red flag signs or symptoms based on visit today. Rx for Albuterol, Tessalon and Robitussin AC for cough. If not improved, would recommend testing for Covid in the next 5 days. RTC prn for any new, changing or worsening symptoms.    - benzonatate (TESSALON) 200 MG capsule; Take 1 capsule (200 mg) by mouth 3 times daily as needed for cough  - albuterol (PROAIR HFA/PROVENTIL HFA/VENTOLIN HFA) 108 (90 Base) MCG/ACT inhaler; Inhale 2 puffs into the lungs every 6 hours as needed for shortness of breath / dyspnea or wheezing  - guaiFENesin-codeine (ROBITUSSIN AC) 100-10 MG/5ML solution; Take 5-10 mLs by mouth every 4 hours as needed for cough     BMI:   Estimated body mass index is 26.63 kg/m  as calculated from the following:    Height as of 5/10/21: 1.651 m (5' 5\").    Weight as of 5/10/21: 72.6 kg (160 lb).     No follow-ups on file.    JESSICA [facility]    Subjective   Dagoberto is a 50 year old who presents for the following health issues     HPI   Test on Monday for EGD, ever since coming out of anesthesia her throat still scratchy, deep cough, sinus congestion.  Feels like bronchitis.  Acute Illness  Acute illness concerns: Cough/sinus congestion  Onset/Duration: 4 days  Symptoms:  Fever: no  Chills/Sweats: YES  Headache (location?): YES- off/on  Sinus Pressure: YES  Conjunctivitis:  no  Ear Pain: YES- feels plugged  Rhinorrhea: YES- yesterday  Congestion: YES  Sore Throat: YES  Cough: YES-non-productive.  Is having tightness/SOB  Wheeze: no  Decreased Appetite: no  Nausea: no  Vomiting: no  Diarrhea: no  Dysuria/Freq.: no  Dysuria or " Hematuria: no  Fatigue/Achiness: YES-fatigue  Sick/Strep Exposure: no  Therapies tried and outcome: Vicks, cough drops, Tessalon capsules-doesn't help    Review of Systems   Constitutional, HEENT, cardiovascular, pulmonary, gi and gu systems are negative, except as otherwise noted.      Objective       Vitals:  No vitals were obtained today due to virtual visit.    Physical Exam   healthy, alert and no distress  PSYCH: Alert and oriented times 3; coherent speech, normal   rate and volume, able to articulate logical thoughts, able   to abstract reason, no tangential thoughts, no hallucinations   or delusions  Her affect is normal  RESP: No cough, no audible wheezing, able to talk in full sentences  Remainder of exam unable to be completed due to telephone visits          Phone call duration: 9 minutes

## 2021-09-07 ENCOUNTER — PATIENT OUTREACH (OUTPATIENT)
Dept: FAMILY MEDICINE | Facility: CLINIC | Age: 51
End: 2021-09-07

## 2021-09-07 DIAGNOSIS — R87.810 CERVICAL HIGH RISK HPV (HUMAN PAPILLOMAVIRUS) TEST POSITIVE: ICD-10-CM

## 2021-10-08 ENCOUNTER — OFFICE VISIT (OUTPATIENT)
Dept: FAMILY MEDICINE | Facility: CLINIC | Age: 51
End: 2021-10-08
Payer: COMMERCIAL

## 2021-10-08 VITALS
BODY MASS INDEX: 27.96 KG/M2 | RESPIRATION RATE: 20 BRPM | SYSTOLIC BLOOD PRESSURE: 138 MMHG | HEIGHT: 65 IN | DIASTOLIC BLOOD PRESSURE: 80 MMHG | OXYGEN SATURATION: 100 % | HEART RATE: 89 BPM | TEMPERATURE: 98 F | WEIGHT: 167.8 LBS

## 2021-10-08 DIAGNOSIS — R05.9 COUGH: ICD-10-CM

## 2021-10-08 DIAGNOSIS — Z00.00 ROUTINE GENERAL MEDICAL EXAMINATION AT A HEALTH CARE FACILITY: Primary | ICD-10-CM

## 2021-10-08 DIAGNOSIS — J01.90 ACUTE SINUSITIS WITH SYMPTOMS > 10 DAYS: ICD-10-CM

## 2021-10-08 DIAGNOSIS — E78.5 HYPERLIPIDEMIA LDL GOAL <130: ICD-10-CM

## 2021-10-08 DIAGNOSIS — F41.9 ANXIETY: ICD-10-CM

## 2021-10-08 DIAGNOSIS — F32.0 MILD MAJOR DEPRESSION (H): ICD-10-CM

## 2021-10-08 PROCEDURE — 96127 BRIEF EMOTIONAL/BEHAV ASSMT: CPT | Performed by: NURSE PRACTITIONER

## 2021-10-08 PROCEDURE — 87624 HPV HI-RISK TYP POOLED RSLT: CPT | Performed by: NURSE PRACTITIONER

## 2021-10-08 PROCEDURE — 99396 PREV VISIT EST AGE 40-64: CPT | Performed by: NURSE PRACTITIONER

## 2021-10-08 PROCEDURE — 99213 OFFICE O/P EST LOW 20 MIN: CPT | Mod: 25 | Performed by: NURSE PRACTITIONER

## 2021-10-08 PROCEDURE — G0145 SCR C/V CYTO,THINLAYER,RESCR: HCPCS | Performed by: NURSE PRACTITIONER

## 2021-10-08 RX ORDER — FLUOXETINE 10 MG/1
10 CAPSULE ORAL DAILY
Qty: 90 CAPSULE | Refills: 3 | Status: SHIPPED | OUTPATIENT
Start: 2021-10-08 | End: 2021-10-22

## 2021-10-08 RX ORDER — ALBUTEROL SULFATE 90 UG/1
2 AEROSOL, METERED RESPIRATORY (INHALATION) EVERY 6 HOURS PRN
Qty: 8.5 G | Refills: 1 | Status: SHIPPED | OUTPATIENT
Start: 2021-10-08 | End: 2022-05-09

## 2021-10-08 RX ORDER — ATORVASTATIN CALCIUM 20 MG/1
20 TABLET, FILM COATED ORAL DAILY
Qty: 90 TABLET | Refills: 3 | Status: SHIPPED | OUTPATIENT
Start: 2021-10-08 | End: 2022-03-02

## 2021-10-08 ASSESSMENT — ENCOUNTER SYMPTOMS
PALPITATIONS: 0
JOINT SWELLING: 0
MYALGIAS: 0
WEAKNESS: 0
EYE PAIN: 0
HEARTBURN: 0
DIZZINESS: 1
NERVOUS/ANXIOUS: 0
PARESTHESIAS: 0
FEVER: 0
ABDOMINAL PAIN: 0
HEADACHES: 1
FREQUENCY: 1
ARTHRALGIAS: 0
HEMATURIA: 0
CONSTIPATION: 0
COUGH: 0
SHORTNESS OF BREATH: 0
SORE THROAT: 0
NAUSEA: 1
DIARRHEA: 0
CHILLS: 0
DYSURIA: 0
HEMATOCHEZIA: 0

## 2021-10-08 ASSESSMENT — PATIENT HEALTH QUESTIONNAIRE - PHQ9
SUM OF ALL RESPONSES TO PHQ QUESTIONS 1-9: 0
10. IF YOU CHECKED OFF ANY PROBLEMS, HOW DIFFICULT HAVE THESE PROBLEMS MADE IT FOR YOU TO DO YOUR WORK, TAKE CARE OF THINGS AT HOME, OR GET ALONG WITH OTHER PEOPLE: NOT DIFFICULT AT ALL
SUM OF ALL RESPONSES TO PHQ QUESTIONS 1-9: 0

## 2021-10-08 ASSESSMENT — MIFFLIN-ST. JEOR: SCORE: 1372.02

## 2021-10-08 NOTE — PROGRESS NOTES
SUBJECTIVE:   CC: Sharron Shipley is an 51 year old woman who presents for preventive health visit.       Patient has been advised of split billing requirements and indicates understanding: Yes  Healthy Habits:     Getting at least 3 servings of Calcium per day:  Yes    Bi-annual eye exam:  Yes    Dental care twice a year:  Yes    Sleep apnea or symptoms of sleep apnea:  None    Diet:  Regular (no restrictions)    Frequency of exercise:  None    Taking medications regularly:  Yes    Medication side effects:  None    PHQ-2 Total Score: 0    Additional concerns today:  No      Hyperlipidemia Follow-Up      Are you regularly taking any medication or supplement to lower your cholesterol?   Yes- Lipitor    Are you having muscle aches or other side effects that you think could be caused by your cholesterol lowering medication?  No    Depression and Anxiety Follow-Up    How are you doing with your depression since your last visit? Improved     How are you doing with your anxiety since your last visit?  Improved     Are you having other symptoms that might be associated with depression or anxiety? No    Have you had a significant life event? OTHER: new grandbaby     Social History     Tobacco Use     Smoking status: Never Smoker     Smokeless tobacco: Never Used   Vaping Use     Vaping Use: Never used   Substance Use Topics     Alcohol use: Not Currently     Comment: rare     Drug use: Never     PHQ 9/23/2020 1/27/2021 10/8/2021   PHQ-9 Total Score 11 7 0   Q9: Thoughts of better off dead/self-harm past 2 weeks Not at all Not at all Not at all     CRYSTAL-7 SCORE 9/23/2020 1/27/2021   Total Score 7 3       Acute Illness   Acute illness concerns: ear plugging  Onset: a few days    Fever: no    Chills/Sweats: no    Headache (location?): no    Sinus Pressure:no    Conjunctivitis:  no    Ear Pain: no    Rhinorrhea: no    Congestion: no    Sore Throat: no     Cough: no    Wheeze: no    Decreased Appetite: no    Nausea:  no    Vomiting: no    Diarrhea:  no    Dysuria/Freq.: no    Fatigue/Achiness: no    Sick/Strep Exposure: no     Therapies Tried and outcome: nasal sprays, OTC decongestant-unhelpful      Today's PHQ-2 Score:   PHQ-2 ( 1999 Pfizer) 10/8/2021   Q1: Little interest or pleasure in doing things 0   Q2: Feeling down, depressed or hopeless 0   PHQ-2 Score 0   Q1: Little interest or pleasure in doing things Not at all   Q2: Feeling down, depressed or hopeless Not at all   PHQ-2 Score 0       Abuse: Current or Past (Physical, Sexual or Emotional) - No  Do you feel safe in your environment? Yes    Have you ever done Advance Care Planning? (For example, a Health Directive, POLST, or a discussion with a medical provider or your loved ones about your wishes): No, advance care planning information given to patient to review.  Patient declined advance care planning discussion at this time.    Social History     Tobacco Use     Smoking status: Never Smoker     Smokeless tobacco: Never Used   Substance Use Topics     Alcohol use: Not Currently     Comment: rare     If you drink alcohol do you typically have >3 drinks per day or >7 drinks per week? No    Alcohol Use 10/8/2021   Prescreen: >3 drinks/day or >7 drinks/week? No   Prescreen: >3 drinks/day or >7 drinks/week? -       Reviewed orders with patient.  Reviewed health maintenance and updated orders accordingly - Yes  Labs reviewed in EPIC    Breast Cancer Screening:    Breast CA Risk Assessment (FHS-7) 10/8/2021   Do you have a family history of breast, colon, or ovarian cancer? No / Unknown       Mammogram Screening: Recommended annual mammography  Pertinent mammograms are reviewed under the imaging tab.    History of abnormal Pap smear:   Last 3 Pap and HPV Results:   PAP / HPV Latest Ref Rng & Units 9/23/2020   PAP (Historical) - NIL   HPV16 NEG:Negative Negative   HPV18 NEG:Negative Negative   HRHPV NEG:Negative Positive(A)     PAP / HPV Latest Ref Rng & Units 9/23/2020  "  PAP (Historical) - NIL   HPV16 NEG:Negative Negative   HPV18 NEG:Negative Negative   HRHPV NEG:Negative Positive(A)     Reviewed and updated as needed this visit by clinical staff  Tobacco  Allergies  Meds  Problems  Med Hx  Surg Hx  Fam Hx  Soc Hx          Reviewed and updated as needed this visit by Provider  Tobacco  Allergies  Meds  Problems  Med Hx  Surg Hx  Fam Hx             Review of Systems   Constitutional: Negative for chills and fever.   HENT: Positive for ear pain. Negative for congestion, hearing loss and sore throat.    Eyes: Negative for pain and visual disturbance.   Respiratory: Negative for cough and shortness of breath.    Cardiovascular: Negative for chest pain, palpitations and peripheral edema.   Gastrointestinal: Positive for nausea. Negative for abdominal pain, constipation, diarrhea, heartburn and hematochezia.   Genitourinary: Positive for frequency. Negative for dysuria, genital sores, hematuria and urgency.   Musculoskeletal: Negative for arthralgias, joint swelling and myalgias.   Skin: Negative for rash.   Neurological: Positive for dizziness and headaches. Negative for weakness and paresthesias.   Psychiatric/Behavioral: Negative for mood changes. The patient is not nervous/anxious.       OBJECTIVE:   /80 (BP Location: Right arm, Patient Position: Sitting, Cuff Size: Adult Regular)   Pulse 89   Temp 98  F (36.7  C) (Tympanic)   Resp 20   Ht 1.651 m (5' 5\")   Wt 76.1 kg (167 lb 12.8 oz)   SpO2 100%   BMI 27.92 kg/m    Physical Exam  GENERAL: healthy, alert and no distress  EYES: Eyes grossly normal to inspection, PERRL and conjunctivae and sclerae normal  HENT: normal cephalic/atraumatic, both ears: occluded with wax, nose and mouth without ulcers or lesions, oropharynx clear, oral mucous membranes moist and sinuses: maxillary, frontal tenderness on bilateral  NECK: no adenopathy, no asymmetry, masses, or scars and thyroid normal to palpation  RESP: lungs " clear to auscultation - no rales, rhonchi or wheezes  BREAST: normal without masses, tenderness or nipple discharge and no palpable axillary masses or adenopathy  CV: regular rate and rhythm, normal S1 S2, no S3 or S4, no murmur, click or rub, no peripheral edema and peripheral pulses strong  ABDOMEN: soft, nontender, no hepatosplenomegaly, no masses and bowel sounds normal   (female): normal female external genitalia, normal urethral meatus, vaginal mucosa pink, moist, well rugated, and normal cervix/adnexa/uterus without masses or discharge  MS: no gross musculoskeletal defects noted, no edema  SKIN: no suspicious lesions or rashes  NEURO: Normal strength and tone, mentation intact and speech normal  PSYCH: mentation appears normal, affect normal/bright    Diagnostic Test Results:  Labs reviewed in Epic    ASSESSMENT/PLAN:   (Z00.00) Routine general medical examination at a health care facility  (primary encounter diagnosis)    Plan: PAP completed today    (F32.0) Mild major depression (H)  Comment: Improvement with the fluoxetine  Plan: FLUoxetine (PROZAC) 10 MG capsule    (R05.9) Cough  Comment: albuterol as needed for cough   Plan: albuterol (PROAIR HFA/PROVENTIL HFA/VENTOLIN         HFA) 108 (90 Base) MCG/ACT inhaler    (F41.9) Anxiety  Comment: improved.   Plan: FLUoxetine (PROZAC) 10 MG capsule          (E78.5) Hyperlipidemia LDL goal <130  Comment: due for labs when fasting.  Lipitor refills sent to the pharmacy.   Plan: atorvastatin (LIPITOR) 20 MG tablet,         Comprehensive metabolic panel (BMP + Alb, Alk         Phos, ALT, AST, Total. Bili, TP), Lipid panel         reflex to direct LDL Fasting    (J01.90) Acute sinusitis with symptoms > 10 days  Comment: with ongoing symptoms Augmentin sent to the pharmacy.  Symptomatic care and follow up discussed.  Ears flushed with some improvement in symptoms.   Plan: amoxicillin-clavulanate (AUGMENTIN) 875-125 MG         tablet            Patient has been  "advised of split billing requirements and indicates understanding: Yes  COUNSELING:  Reviewed preventive health counseling, as reflected in patient instructions    Estimated body mass index is 27.92 kg/m  as calculated from the following:    Height as of this encounter: 1.651 m (5' 5\").    Weight as of this encounter: 76.1 kg (167 lb 12.8 oz).        She reports that she has never smoked. She has never used smokeless tobacco.      Counseling Resources:  ATP IV Guidelines  Pooled Cohorts Equation Calculator  Breast Cancer Risk Calculator  BRCA-Related Cancer Risk Assessment: FHS-7 Tool  FRAX Risk Assessment  ICSI Preventive Guidelines  Dietary Guidelines for Americans, 2010  [employer]'s MyPlate  ASA Prophylaxis  Lung CA Screening    [facility]  Answers for HPI/ROS submitted by the patient on 10/8/2021  If you checked off any problems, how difficult have these problems made it for you to do your work, take care of things at home, or get along with other people?: Not difficult at all  PHQ9 TOTAL SCORE: 0      "

## 2021-10-08 NOTE — PATIENT INSTRUCTIONS
Mammogram at 9:45 11/16 with labs following    Augmentin twice daily for 10 days    Follow up if symptoms do not improve or worsen.      Preventive Health Recommendations  Female Ages 50 - 64    Yearly exam: See your health care provider every year in order to  o Review health changes.   o Discuss preventive care.    o Review your medicines if your doctor has prescribed any.      Get a Pap test every three years (unless you have an abnormal result and your provider advises testing more often).    If you get Pap tests with HPV test, you only need to test every 5 years, unless you have an abnormal result.     You do not need a Pap test if your uterus was removed (hysterectomy) and you have not had cancer.    You should be tested each year for STDs (sexually transmitted diseases) if you're at risk.     Have a mammogram every 1 to 2 years.    Have a colonoscopy at age 50, or have a yearly FIT test (stool test). These exams screen for colon cancer.      Have a cholesterol test every 5 years, or more often if advised.    Have a diabetes test (fasting glucose) every three years. If you are at risk for diabetes, you should have this test more often.     If you are at risk for osteoporosis (brittle bone disease), think about having a bone density scan (DEXA).    Shots: Get a flu shot each year. Get a tetanus shot every 10 years.    Nutrition:     Eat at least 5 servings of fruits and vegetables each day.    Eat whole-grain bread, whole-wheat pasta and brown rice instead of white grains and rice.    Get adequate Calcium and Vitamin D.     Lifestyle    Exercise at least 150 minutes a week (30 minutes a day, 5 days a week). This will help you control your weight and prevent disease.    Limit alcohol to one drink per day.    No smoking.     Wear sunscreen to prevent skin cancer.     See your dentist every six months for an exam and cleaning.    See your eye doctor every 1 to 2 years.

## 2021-10-12 LAB
BKR LAB AP GYN ADEQUACY: NORMAL
BKR LAB AP GYN INTERPRETATION: NORMAL
BKR LAB AP HPV REFLEX: NORMAL
BKR LAB AP PREVIOUS ABNORMAL: NORMAL
PATH REPORT.COMMENTS IMP SPEC: NORMAL
PATH REPORT.RELEVANT HX SPEC: NORMAL

## 2021-10-14 ENCOUNTER — PATIENT OUTREACH (OUTPATIENT)
Dept: FAMILY MEDICINE | Facility: CLINIC | Age: 51
End: 2021-10-14
Payer: COMMERCIAL

## 2021-10-14 LAB
HUMAN PAPILLOMA VIRUS 16 DNA: NEGATIVE
HUMAN PAPILLOMA VIRUS 18 DNA: NEGATIVE
HUMAN PAPILLOMA VIRUS FINAL DIAGNOSIS: ABNORMAL
HUMAN PAPILLOMA VIRUS OTHER HR: POSITIVE

## 2021-10-19 PROBLEM — F32.9 MAJOR DEPRESSION: Status: ACTIVE | Noted: 2020-09-29

## 2021-10-21 DIAGNOSIS — F41.9 ANXIETY: ICD-10-CM

## 2021-10-21 DIAGNOSIS — F32.0 MILD MAJOR DEPRESSION (H): ICD-10-CM

## 2021-10-22 RX ORDER — FLUOXETINE 10 MG/1
CAPSULE ORAL
Qty: 60 CAPSULE | Refills: 0 | Status: SHIPPED | OUTPATIENT
Start: 2021-10-22 | End: 2021-12-01

## 2021-11-16 ENCOUNTER — ANCILLARY PROCEDURE (OUTPATIENT)
Dept: MAMMOGRAPHY | Facility: CLINIC | Age: 51
End: 2021-11-16
Payer: COMMERCIAL

## 2021-11-16 ENCOUNTER — LAB (OUTPATIENT)
Dept: LAB | Facility: CLINIC | Age: 51
End: 2021-11-16

## 2021-11-16 DIAGNOSIS — E78.5 HYPERLIPIDEMIA LDL GOAL <130: ICD-10-CM

## 2021-11-16 DIAGNOSIS — Z12.31 VISIT FOR SCREENING MAMMOGRAM: ICD-10-CM

## 2021-11-16 LAB
ALBUMIN SERPL-MCNC: 3.8 G/DL (ref 3.4–5)
ALP SERPL-CCNC: 82 U/L (ref 40–150)
ALT SERPL W P-5'-P-CCNC: 28 U/L (ref 0–50)
ANION GAP SERPL CALCULATED.3IONS-SCNC: 4 MMOL/L (ref 3–14)
AST SERPL W P-5'-P-CCNC: 24 U/L (ref 0–45)
BILIRUB SERPL-MCNC: 0.7 MG/DL (ref 0.2–1.3)
BUN SERPL-MCNC: 10 MG/DL (ref 7–30)
CALCIUM SERPL-MCNC: 9 MG/DL (ref 8.5–10.1)
CHLORIDE BLD-SCNC: 107 MMOL/L (ref 94–109)
CHOLEST SERPL-MCNC: 162 MG/DL
CO2 SERPL-SCNC: 32 MMOL/L (ref 20–32)
CREAT SERPL-MCNC: 0.76 MG/DL (ref 0.52–1.04)
FASTING STATUS PATIENT QL REPORTED: YES
GFR SERPL CREATININE-BSD FRML MDRD: 90 ML/MIN/1.73M2
GLUCOSE BLD-MCNC: 98 MG/DL (ref 70–99)
HDLC SERPL-MCNC: 60 MG/DL
LDLC SERPL CALC-MCNC: 79 MG/DL
NONHDLC SERPL-MCNC: 102 MG/DL
POTASSIUM BLD-SCNC: 3.7 MMOL/L (ref 3.4–5.3)
PROT SERPL-MCNC: 7.5 G/DL (ref 6.8–8.8)
SODIUM SERPL-SCNC: 143 MMOL/L (ref 133–144)
TRIGL SERPL-MCNC: 114 MG/DL

## 2021-11-16 PROCEDURE — 77063 BREAST TOMOSYNTHESIS BI: CPT | Mod: TC | Performed by: RADIOLOGY

## 2021-11-16 PROCEDURE — 80061 LIPID PANEL: CPT

## 2021-11-16 PROCEDURE — 80053 COMPREHEN METABOLIC PANEL: CPT

## 2021-11-16 PROCEDURE — 77067 SCR MAMMO BI INCL CAD: CPT | Mod: TC | Performed by: RADIOLOGY

## 2021-11-16 PROCEDURE — 36415 COLL VENOUS BLD VENIPUNCTURE: CPT

## 2022-01-05 PROBLEM — R87.810 CERVICAL HIGH RISK HPV (HUMAN PAPILLOMAVIRUS) TEST POSITIVE: Status: ACTIVE | Noted: 2020-09-30

## 2022-01-28 ENCOUNTER — TELEPHONE (OUTPATIENT)
Dept: FAMILY MEDICINE | Facility: CLINIC | Age: 52
End: 2022-01-28

## 2022-01-28 NOTE — TELEPHONE ENCOUNTER
Reason for call:  Other   Patient called regarding (reason for call): appointment  Additional comments: Patient is calling to set up a colposcopy procedure.     Phone number to reach patient:  Cell number on file:    Telephone Information:   Mobile [phone]       Best Time:  Anytime     Can we leave a detailed message on this number?  YES    Travel screening: Not Applicable

## 2022-02-05 ENCOUNTER — APPOINTMENT (OUTPATIENT)
Dept: URGENT CARE | Facility: CLINIC | Age: 52
End: 2022-02-05
Payer: COMMERCIAL

## 2022-03-03 ENCOUNTER — OFFICE VISIT (OUTPATIENT)
Dept: OBGYN | Facility: CLINIC | Age: 52
End: 2022-03-03
Payer: COMMERCIAL

## 2022-03-03 VITALS
HEIGHT: 65 IN | SYSTOLIC BLOOD PRESSURE: 151 MMHG | TEMPERATURE: 96.9 F | BODY MASS INDEX: 28.19 KG/M2 | DIASTOLIC BLOOD PRESSURE: 89 MMHG | HEART RATE: 81 BPM | WEIGHT: 169.2 LBS | RESPIRATION RATE: 14 BRPM

## 2022-03-03 DIAGNOSIS — R87.810 CERVICAL HIGH RISK HPV (HUMAN PAPILLOMAVIRUS) TEST POSITIVE: Primary | ICD-10-CM

## 2022-03-03 PROCEDURE — 88342 IMHCHEM/IMCYTCHM 1ST ANTB: CPT | Performed by: PATHOLOGY

## 2022-03-03 PROCEDURE — 57454 BX/CURETT OF CERVIX W/SCOPE: CPT | Performed by: OBSTETRICS & GYNECOLOGY

## 2022-03-03 PROCEDURE — 88305 TISSUE EXAM BY PATHOLOGIST: CPT | Performed by: PATHOLOGY

## 2022-03-03 NOTE — PROGRESS NOTES
"Initial BP (!) 151/89 (BP Location: Left arm, Patient Position: Chair, Cuff Size: Adult Regular)   Pulse 81   Temp 96.9  F (36.1  C) (Tympanic)   Resp 14   Ht 1.651 m (5' 5\")   Wt 76.7 kg (169 lb 3.2 oz)   BMI 28.16 kg/m   Estimated body mass index is 28.16 kg/m  as calculated from the following:    Height as of this encounter: 1.651 m (5' 5\").    Weight as of this encounter: 76.7 kg (169 lb 3.2 oz). .      "

## 2022-03-03 NOTE — PROGRESS NOTES
[facility] Colpscopy Procedure Note    Sharron Shipley  1970  [id number]    Pap Hx: NILM PAP, other HR HPV positive 9/20 and 10/21.     The patient was counseled on the risks (including risk of pain and bleeding), benefits (diagnosis of lesion severity and depth in invasion). Verbal and written consent were obtained.     Technique: The patient was placed in the dorsal lithotomy position.  A speculum was placed in the vagina and the cervix visualized. Acetic acid was applied to the upper vagina and cervix and then visualized using the colposcope. Acetowhite staining was noted at a [place] at 9 o'clock fairly brightly at the SCJ.  Lugol's solution was also placed on the cervix with decreased up take noted at the same area. The squamocolumnar junction and transformation zone were fully visualized and colposcopy was satisfactory. Colposcopic impression: SHAWN 1-2.   Biopsies were taken at 9, 12, 2 o'clock. Endocervical curettage was performed. Silver nitrate used to assure excellent hemostasis.  The patient tolerated the procedure well.  She was given post op instructions which included activity and pelvic restrictions.      A/P: s/p colposcopy procedure per ASCCP guidelines.   Will MyChart to discuss results with patient.     Pt is a non smoker fabio Schultz MD  OB/GYN   March 3, 2022

## 2022-03-08 ENCOUNTER — MYC MEDICAL ADVICE (OUTPATIENT)
Dept: FAMILY MEDICINE | Facility: CLINIC | Age: 52
End: 2022-03-08
Payer: COMMERCIAL

## 2022-03-08 LAB
PATH REPORT.COMMENTS IMP SPEC: NORMAL
PATH REPORT.FINAL DX SPEC: NORMAL
PATH REPORT.FINAL DX SPEC: NORMAL
PATH REPORT.GROSS SPEC: NORMAL
PATH REPORT.GROSS SPEC: NORMAL
PATH REPORT.MICROSCOPIC SPEC OTHER STN: NORMAL
PATH REPORT.RELEVANT HX SPEC: NORMAL
PATH REPORT.RELEVANT HX SPEC: NORMAL
PHOTO IMAGE: NORMAL
PHOTO IMAGE: NORMAL

## 2022-03-21 ENCOUNTER — PATIENT OUTREACH (OUTPATIENT)
Dept: OBGYN | Facility: CLINIC | Age: 52
End: 2022-03-21
Payer: COMMERCIAL

## 2022-04-20 ENCOUNTER — TELEPHONE (OUTPATIENT)
Dept: FAMILY MEDICINE | Facility: CLINIC | Age: 52
End: 2022-04-20
Payer: COMMERCIAL

## 2022-04-25 ENCOUNTER — OFFICE VISIT (OUTPATIENT)
Dept: OBGYN | Facility: CLINIC | Age: 52
End: 2022-04-25
Payer: COMMERCIAL

## 2022-04-25 VITALS
TEMPERATURE: 99 F | DIASTOLIC BLOOD PRESSURE: 87 MMHG | BODY MASS INDEX: 28.29 KG/M2 | HEIGHT: 65 IN | RESPIRATION RATE: 18 BRPM | WEIGHT: 169.8 LBS | SYSTOLIC BLOOD PRESSURE: 142 MMHG | HEART RATE: 102 BPM

## 2022-04-25 DIAGNOSIS — R87.613 HIGH GRADE SQUAMOUS INTRAEPITHELIAL CERVICAL DYSPLASIA: ICD-10-CM

## 2022-04-25 DIAGNOSIS — R87.810 CERVICAL HIGH RISK HPV (HUMAN PAPILLOMAVIRUS) TEST POSITIVE: Primary | ICD-10-CM

## 2022-04-25 PROCEDURE — 88307 TISSUE EXAM BY PATHOLOGIST: CPT

## 2022-04-25 PROCEDURE — 88342 IMHCHEM/IMCYTCHM 1ST ANTB: CPT

## 2022-04-25 PROCEDURE — 88305 TISSUE EXAM BY PATHOLOGIST: CPT

## 2022-04-25 PROCEDURE — 57461 CONZ OF CERVIX W/SCOPE LEEP: CPT | Performed by: OBSTETRICS & GYNECOLOGY

## 2022-04-25 NOTE — PROGRESS NOTES
"Initial BP (!) 142/87 (BP Location: Right arm, Patient Position: Chair, Cuff Size: Adult Regular)   Pulse 102   Temp 99  F (37.2  C) (Tympanic)   Resp 18   Ht 1.651 m (5' 5\")   Wt 77 kg (169 lb 12.8 oz)   BMI 28.26 kg/m   Estimated body mass index is 28.26 kg/m  as calculated from the following:    Height as of this encounter: 1.651 m (5' 5\").    Weight as of this encounter: 77 kg (169 lb 12.8 oz). .        "

## 2022-04-25 NOTE — PROGRESS NOTES
LEEP Procedure Note    Sharron Shipley  1970  [id number]    PAP Hx:   2006, 2007, 2008, 2012 NIL Paps (Care Everywhere)  2017 NIL Pap, Neg HPV (Care Everywhere)  9/23/20 NIL Pap, + HR HPV (neg 16/18). [place] due in 1 year.   10/8/21 NIL Pap, + HR HPV (neg 16/18). [place] due by 1/8/2022.    3/3/22 Colpo bx SHAWN I, focal SHAWN II-III. Plan: LEEP due before 6/3/22    The patient was counseled on the risks (including risk of infection, bleeding, pain/cramping), benefits (both complete diagnosis and treatment). Verbal and written consent were obtained.      Technique: The patient was placed in the dorsal lithotomy position.  A coated speculum was placed in the vagina and the cervix visualized. Acetic acid was applied to the cervix and upper vagina and visualized using the colposcope. Acetowhite staining was noted at a small area at 5-7 o'clock.  Lugol's solution was also placed on the cervix with decreased up take noted faintly across the T-zone. The squamocolumnar junction and transformation zone were fully visualized and colposcopy was satisfactory. Colposcopic impression: CIN1-2 .   An intracervical block with 10cc of lidocaine with epi was placed in four quadrants of the cervix.  A green loop electrode was used and a cone of the cervix was removed. Endocervical curettage was performed. A ball electrode was used on coagulation electrocautery to achieve good hemostasis of the cervix.  Monsell's solution was applied to assure excellent hemostasis.  The patient tolerated the procedure well.  She was given post op instructions which included activity and pelvic restrictions.      A/P:  s/p LEEP procedure per ASCCP guidelines.   Will MyChart to discuss results with patient.   Pt wants a hysterectomy so virtual visit was scheduled.     Trini Schultz MD  OB/GYN   4/25/2022

## 2022-04-29 LAB
PATH REPORT.COMMENTS IMP SPEC: NORMAL
PATH REPORT.FINAL DX SPEC: NORMAL
PATH REPORT.FINAL DX SPEC: NORMAL
PATH REPORT.GROSS SPEC: NORMAL
PATH REPORT.GROSS SPEC: NORMAL
PATH REPORT.MICROSCOPIC SPEC OTHER STN: NORMAL
PATH REPORT.MICROSCOPIC SPEC OTHER STN: NORMAL
PATH REPORT.RELEVANT HX SPEC: NORMAL
PATH REPORT.RELEVANT HX SPEC: NORMAL
PHOTO IMAGE: NORMAL
PHOTO IMAGE: NORMAL

## 2022-05-02 ENCOUNTER — TELEPHONE (OUTPATIENT)
Dept: OBGYN | Facility: CLINIC | Age: 52
End: 2022-05-02
Payer: COMMERCIAL

## 2022-05-02 ENCOUNTER — PREP FOR PROCEDURE (OUTPATIENT)
Dept: OBGYN | Facility: CLINIC | Age: 52
End: 2022-05-02
Payer: COMMERCIAL

## 2022-05-02 ENCOUNTER — VIRTUAL VISIT (OUTPATIENT)
Dept: OBGYN | Facility: CLINIC | Age: 52
End: 2022-05-02
Payer: COMMERCIAL

## 2022-05-02 DIAGNOSIS — R87.613 HIGH GRADE SQUAMOUS INTRAEPITHELIAL CERVICAL DYSPLASIA: Primary | ICD-10-CM

## 2022-05-02 PROCEDURE — 99214 OFFICE O/P EST MOD 30 MIN: CPT | Mod: 95 | Performed by: OBSTETRICS & GYNECOLOGY

## 2022-05-02 NOTE — TELEPHONE ENCOUNTER
"  [id number]  Sharron Shipley    You are now scheduled for surgery at [facility].  Below are the details for your surgery.  Please read the \"Preparing for Your Surgery\" instructions and let us know if you have any questions.    Type of surgery:  Transvaginal hysterectomy,bilateral salpingo-oophorectomy, cystoscopy     Surgeon:  Trini Schultz MD  Location of surgery: [facility] OR    Date of surgery: 6/7/22    Time: 7:30 am   Arrival Time: 6:00 am    Time can change, to be confirmed a couple of days prior by pre-op surgery nurse.    Pre-Op Appt Date: Patient to schedule with a PCP or Family Practice Provider within 30 days to the surgery.  Post-Op Appt Date: To be determined by provider     COVID test 2-4 days prior at [facility]  Date  6/3/22  Time  2:00 PM    Packet sent out: Yes  Pre-cert/Authorization completed:  TBD by Financial Securing Office.   MA Sterilization/Hysterectomy Acknowledgment Consent signed: Not Applicable    [facility]  [phone]    Fax: [phone]  Same Day Surgery [phone]  Fax: [phone]  [facility] [phone]'  "

## 2022-05-02 NOTE — PROGRESS NOTES
Dagoberto is a 51 year old who is being evaluated via a billable telephone visit.      What phone number would you like to be contacted at? [phone]    How would you like to obtain your AVS? Sae Anderson LPN    [facility]  OB/GYN Clinic   Gynecology Consult Note    CC:  Chief Complaint   Patient presents with     Consult     Hysterectomy       HPI: Ms. Shipley is a 51 year old P4 being seen for GYN consultation for history of high-grade cervical dysplasia. She has had a 2 year hx of HPV+ and then colposcopy with high grade dysplasia. She underwent a LEEP procedure. Both the colposcopy and the LEEP were very traumatic for her and she is terrified of repeated procedures and getting cervical cancer.   Recovered from the LEEP procedure, still having slight bleeding.   Still feels confident that she wants a hysterectomy.     GYN Hx: Menopause since 4-5 years. No PMB. No uterine polyps, fibroids, uterine cysts.    ROS: A 10 pt ROS was completed and found to be otherwise negative unless mentioned in the HPI.     PMH:   Past Medical History:   Diagnosis Date     Cervical high risk HPV (human papillomavirus) test positive 2020 2021       PSHx:   Past Surgical History:   Procedure Laterality Date     ESOPHAGOSCOPY, GASTROSCOPY, DUODENOSCOPY (EGD), DILATATION, COMBINED N/A 5/10/2021    Procedure: ESOPHAGOGASTRODUODENOSCOPY, WITH DILATION of lower esophageal sphincter;  Surgeon: Valentin Taylor MD;  Location: [facility]       OBHx:   OB History   No obstetric history on file.   P4, vaginal deliveries.     Medications:  atorvastatin (LIPITOR) 20 MG tablet, Take 1 tablet by mouth once daily  cyclobenzaprine (FLEXERIL) 10 MG tablet, Take 1 tablet by mouth three times daily as needed for muscle spasm  FLUoxetine (PROZAC) 10 MG capsule, TAKE 1 CAPSULE BY MOUTH ONCE DAILY FOR  1-2  WEEKS,  THEN  INCREASE  TO  2  CAPSULES  DAILY  Multiple Vitamin (MULTIVITAMIN ADULT PO),   valACYclovir (VALTREX) 1000  Patient calling stating that she was just told by her insurance company that they were not going to push emergency authorization through to get a spinal tap. Patient is asking if Dr. Parra would like to see her first. Patient states that she is having left eye pain and muscle spasms.     Patient can be reached at:[phone]   mg tablet, Take 2 tablets by mouth twice daily  valACYclovir (VALTREX) 500 MG tablet, Take 1 tablet by mouth once daily  vitamin B complex with vitamin C (STRESS TAB) tablet, Take 1 tablet by mouth daily  VITAMIN D PO,   albuterol (PROAIR HFA/PROVENTIL HFA/VENTOLIN HFA) 108 (90 Base) MCG/ACT inhaler, Inhale 2 puffs into the lungs every 6 hours as needed for shortness of breath / dyspnea or wheezing (Patient not taking: No sig reported)  benzonatate (TESSALON) 200 MG capsule, Take 1 capsule (200 mg) by mouth 3 times daily as needed for cough (Patient not taking: No sig reported)    No current facility-administered medications on file prior to visit.      Allergies:    No Known Allergies    Social History:  Social History     Socioeconomic History     Marital status: Single     Spouse name: Not on file     Number of children: Not on file     Years of education: Not on file     Highest education level: Not on file   Occupational History     Not on file   Tobacco Use     Smoking status: Never Smoker     Smokeless tobacco: Never Used   Vaping Use     Vaping Use: Never used   Substance and Sexual Activity     Alcohol use: Not Currently     Comment: rare     Drug use: Never     Sexual activity: Yes     Partners: Male     Birth control/protection: Post-menopausal   Other Topics Concern     Parent/sibling w/ CABG, MI or angioplasty before 65F 55M? No   Social History Narrative     Not on file     Social Determinants of Health     Financial Resource Strain: Not on file   Food Insecurity: Not on file   Transportation Needs: Not on file   Physical Activity: Not on file   Stress: Not on file   Social Connections: Not on file   Intimate Partner Violence: Not on file   Housing Stability: Not on file     Social History     Socioeconomic History     Marital status: Single     Spouse name: None     Number of children: None     Years of education: None     Highest education level: None   Tobacco Use     Smoking status: Never Smoker      Smokeless tobacco: Never Used   Vaping Use     Vaping Use: Never used   Substance and Sexual Activity     Alcohol use: Not Currently     Comment: rare     Drug use: Never     Sexual activity: Yes     Partners: Male     Birth control/protection: Post-menopausal   Other Topics Concern     Parent/sibling w/ CABG, MI or angioplasty before 65F 55M? No       Family History:   Family History   Problem Relation Age of Onset     Diabetes Mother         type 2     Hyperlipidemia Mother      Sleep Apnea Father      Diabetes Son         type 1     Attention Deficit Disorder Son      Attention Deficit Disorder Daughter      Diabetes Son        Physical Exam:   GEN: no distress  PSYCH: Alert, coherent speech, normal   rate and volume, able to articulate logical thoughts, able   to abstract reason, no tangential thoughts, no hallucinations   or delusions, normal affect  RESP: No cough, no audible wheezing, able to talk in full sentences  Remainder of exam unable to be completed due to telephone visits    A&P: Ms. Shipley is a 50 yo P4 female with a hx of high-grade cervical dysplasia/trauma with colposcopy and LEEP procedure who desires definitive surgical management with a hysterectomy. I discussed my typical approach of minimally-invasive surgery, and that I think she would be a candidate for a transvaginal hysterectomy, bilateral salpingectomy. Good uterine descensus was noted during her recent colposcopy. I discussed the steps of the procedure in detail as well as the expected recovery. Additionally, I reviewed the risks of bleeding, infection and recognized and unrecognized intraabdominal injury. I discussed the risk of needing laparoscopy or laparotomy. She understands the alternative of routine screening with the ASCCP guidelines as an alternative.   She is agreeable to a blood transfusion if indicated.   We also discussed the literature around ovarian conservation vs excision. She would prefer bilateral oophorectomy if  it is possible to accomplish this vaginally. If not, ok to skip.     Orders placed. Needs pre-op clearance and COVID swab.   I spent 14 min over the phone with the patient.     Trini Schultz MD  OB/GYN  5/2/2022

## 2022-05-04 DIAGNOSIS — F41.9 ANXIETY: ICD-10-CM

## 2022-05-04 DIAGNOSIS — F32.0 MILD MAJOR DEPRESSION (H): ICD-10-CM

## 2022-05-05 RX ORDER — FLUOXETINE 10 MG/1
CAPSULE ORAL
Qty: 60 CAPSULE | Refills: 0 | Status: SHIPPED | OUTPATIENT
Start: 2022-05-05 | End: 2022-05-09

## 2022-05-09 ENCOUNTER — OFFICE VISIT (OUTPATIENT)
Dept: FAMILY MEDICINE | Facility: CLINIC | Age: 52
End: 2022-05-09
Payer: COMMERCIAL

## 2022-05-09 VITALS
BODY MASS INDEX: 28.09 KG/M2 | DIASTOLIC BLOOD PRESSURE: 86 MMHG | HEART RATE: 80 BPM | SYSTOLIC BLOOD PRESSURE: 124 MMHG | TEMPERATURE: 97.6 F | OXYGEN SATURATION: 99 % | WEIGHT: 168.6 LBS | HEIGHT: 65 IN | RESPIRATION RATE: 20 BRPM

## 2022-05-09 DIAGNOSIS — R87.613 HIGH GRADE SQUAMOUS INTRAEPITHELIAL CERVICAL DYSPLASIA: ICD-10-CM

## 2022-05-09 DIAGNOSIS — F41.9 ANXIETY: ICD-10-CM

## 2022-05-09 DIAGNOSIS — Z01.818 PREOP GENERAL PHYSICAL EXAM: Primary | ICD-10-CM

## 2022-05-09 DIAGNOSIS — F32.0 MILD MAJOR DEPRESSION (H): ICD-10-CM

## 2022-05-09 DIAGNOSIS — R87.810 CERVICAL HIGH RISK HPV (HUMAN PAPILLOMAVIRUS) TEST POSITIVE: ICD-10-CM

## 2022-05-09 PROCEDURE — 99214 OFFICE O/P EST MOD 30 MIN: CPT | Performed by: NURSE PRACTITIONER

## 2022-05-09 RX ORDER — FLUOXETINE 10 MG/1
10 CAPSULE ORAL DAILY
Qty: 90 CAPSULE | Refills: 3 | Status: SHIPPED | OUTPATIENT
Start: 2022-05-09 | End: 2023-01-30

## 2022-05-09 ASSESSMENT — ANXIETY QUESTIONNAIRES
7. FEELING AFRAID AS IF SOMETHING AWFUL MIGHT HAPPEN: NOT AT ALL
5. BEING SO RESTLESS THAT IT IS HARD TO SIT STILL: NOT AT ALL
6. BECOMING EASILY ANNOYED OR IRRITABLE: NOT AT ALL
3. WORRYING TOO MUCH ABOUT DIFFERENT THINGS: NOT AT ALL
2. NOT BEING ABLE TO STOP OR CONTROL WORRYING: NOT AT ALL
1. FEELING NERVOUS, ANXIOUS, OR ON EDGE: NOT AT ALL
GAD7 TOTAL SCORE: 0

## 2022-05-09 ASSESSMENT — PATIENT HEALTH QUESTIONNAIRE - PHQ9
5. POOR APPETITE OR OVEREATING: NOT AT ALL
SUM OF ALL RESPONSES TO PHQ QUESTIONS 1-9: 2

## 2022-05-09 NOTE — PROGRESS NOTES
[facility]  [address] [place] [id number]  Phone: [phone]  Fax: [phone]  Primary Provider: Latasha Patel  Pre-op Performing Provider: LATASHA PATEL    PREOPERATIVE EVALUATION:  Today's date: 5/9/2022    Sharron Shipley is a 51 year old female who presents for a preoperative evaluation.    Surgical Information:  Surgery/Procedure: Transvaginal hysterectomy, bilateral salpingo-oopherectomy, cystoscopy, excision of skin tag  Surgery Location: [facility] OR  Surgeon: Dr. Trini Schultz  Surgery Date: 06/07/2022  Time of Surgery: 7:30 AM  Where patient plans to recover: At home with family  Fax number for surgical facility: Note does not need to be faxed, will be available electronically in Epic.    Type of Anesthesia Anticipated: General    Assessment & Plan     The proposed surgical procedure is considered INTERMEDIATE risk.    Preop general physical exam    High grade squamous intraepithelial cervical dysplasia    Cervical high risk HPV (human papillomavirus) test positive    Mild major depression (H)    - FLUoxetine (PROZAC) 10 MG capsule; Take 1 capsule (10 mg) by mouth daily    Anxiety    - FLUoxetine (PROZAC) 10 MG capsule; Take 1 capsule (10 mg) by mouth daily       Risks and Recommendations:  The patient has the following additional risks and recommendations for perioperative complications:   - No identified additional risk factors other than previously addressed    Medication Instructions:  Patient is to take all scheduled medications on the day of surgery    RECOMMENDATION:  APPROVAL GIVEN to proceed with proposed procedure, without further diagnostic evaluation.      Subjective     HPI related to upcoming procedure: high grade squamous intraepithelial cervical dysplasia in need of hysterectomy     Preop Questions 5/3/2022   1. Have you ever had a heart attack or stroke? No   2. Have you ever had surgery on your heart or blood vessels, such  as a stent placement, a coronary artery bypass, or surgery on an artery in your head, neck, heart, or legs? No   3. Do you have chest pain with activity? No   4. Do you have a history of  heart failure? No   5. Do you currently have a cold, bronchitis or symptoms of other infection? No   6. Do you have a cough, shortness of breath, or wheezing? No   7. Do you or anyone in your family have previous history of blood clots? No   8. Do you or does anyone in your family have a serious bleeding problem such as prolonged bleeding following surgeries or cuts? No   9. Have you ever had problems with anemia or been told to take iron pills? No   10. Have you had any abnormal blood loss such as black, tarry or bloody stools, or abnormal vaginal bleeding? No   11. Have you ever had a blood transfusion? No   12. Are you willing to have a blood transfusion if it is medically needed before, during, or after your surgery? Yes   13. Have you or any of your relatives ever had problems with anesthesia? No   14. Do you have sleep apnea, excessive snoring or daytime drowsiness? No   15. Do you have any artifical heart valves or other implanted medical devices like a pacemaker, defibrillator, or continuous glucose monitor? No   16. Do you have artificial joints? No   17. Are you allergic to latex? No   18. Is there any chance that you may be pregnant? No       Health Care Directive:  Patient does not have a Health Care Directive or Living Will: Discussed advance care planning with patient; however, patient declined at this time.    Preoperative Review of :   reviewed - no record of controlled substances prescribed.      Review of Systems  CONSTITUTIONAL: NEGATIVE for fever, chills, change in weight  INTEGUMENTARY/SKIN: NEGATIVE for worrisome rashes, moles or lesions  EYES: NEGATIVE for vision changes or irritation  ENT/MOUTH: NEGATIVE for ear, mouth and throat problems  RESP: NEGATIVE for significant cough or SOB  CV: NEGATIVE for  chest pain, palpitations or peripheral edema  GI: NEGATIVE for nausea, abdominal pain, heartburn, or change in bowel habits  : NEGATIVE for frequency, dysuria, or hematuria  MUSCULOSKELETAL: NEGATIVE for significant arthralgias or myalgia  NEURO: NEGATIVE for weakness, dizziness or paresthesias  ENDOCRINE: NEGATIVE for temperature intolerance, skin/hair changes  HEME: NEGATIVE for bleeding problems  PSYCHIATRIC: NEGATIVE for changes in mood or affect    Patient Active Problem List    Diagnosis Date Noted     Cervical high risk HPV (human papillomavirus) test positive 09/30/2020     Priority: Medium     2006, 2007, 2008, 2012 NIL Paps (Care Everywhere)  2017 NIL Pap, Neg HPV (Care Everywhere)  9/23/20 NIL Pap, + HR HPV (neg 16/18). [place] due in 1 year.   10/8/21 NIL Pap, + HR HPV (neg 16/18). [place] due by 1/8/2022.    3/3/22 Colpo bx SHAWN I, focal SHAWN II-III. Plan: LEEP due before 6/3/22  4/25/22 LEEP appt           Mild major depression (H) 09/29/2020     Priority: Medium     Anxiety 09/29/2020     Priority: Medium     Recurrent cold sores 09/29/2020     Priority: Medium     Restless leg syndrome 09/29/2020     Priority: Medium      Past Medical History:   Diagnosis Date     Cervical high risk HPV (human papillomavirus) test positive 2020 2021     Past Surgical History:   Procedure Laterality Date     ESOPHAGOSCOPY, GASTROSCOPY, DUODENOSCOPY (EGD), DILATATION, COMBINED N/A 5/10/2021    Procedure: ESOPHAGOGASTRODUODENOSCOPY, WITH DILATION of lower esophageal sphincter;  Surgeon: Valentin Taylor MD;  Location: [facility]     Current Outpatient Medications   Medication Sig Dispense Refill     atorvastatin (LIPITOR) 20 MG tablet Take 1 tablet by mouth once daily 30 tablet 8     cyclobenzaprine (FLEXERIL) 10 MG tablet Take 1 tablet by mouth three times daily as needed for muscle spasm 30 tablet 3     FLUoxetine (PROZAC) 10 MG capsule Take 1 capsule (10 mg) by mouth daily 90 capsule 3     Multiple Vitamin (MULTIVITAMIN  "ADULT PO)        valACYclovir (VALTREX) 1000 mg tablet Take 2 tablets by mouth twice daily 8 tablet 3     valACYclovir (VALTREX) 500 MG tablet Take 1 tablet by mouth once daily 30 tablet 8     vitamin B complex with vitamin C (STRESS TAB) tablet Take 1 tablet by mouth daily       VITAMIN D PO          No Known Allergies     Social History     Tobacco Use     Smoking status: Never Smoker     Smokeless tobacco: Never Used   Substance Use Topics     Alcohol use: Not Currently     Comment: rare     History   Drug Use Unknown         Objective     /86 (BP Location: Right arm, Patient Position: Sitting, Cuff Size: Adult Regular)   Pulse 80   Temp 97.6  F (36.4  C) (Tympanic)   Resp 20   Ht 1.651 m (5' 5\")   Wt 76.5 kg (168 lb 9.6 oz)   LMP  (LMP Unknown)   SpO2 99%   Breastfeeding No   BMI 28.06 kg/m      Physical Exam    GENERAL APPEARANCE: healthy, alert and no distress     EYES: EOMI, PERRL     HENT: ear canals and TM's normal and nose and mouth without ulcers or lesions     NECK: no adenopathy, no asymmetry, masses, or scars and thyroid normal to palpation     RESP: lungs clear to auscultation - no rales, rhonchi or wheezes     CV: regular rates and rhythm, normal S1 S2, no S3 or S4 and no murmur, click or rub     ABDOMEN:  soft, nontender, no HSM or masses and bowel sounds normal     MS: extremities normal- no gross deformities noted     SKIN: no suspicious lesions or rashes     NEURO: Normal strength and tone, sensory exam grossly normal, mentation intact and speech normal     PSYCH: mentation appears normal. and affect normal/bright     LYMPHATICS: No cervical adenopathy    Recent Labs   Lab Test 11/16/21  1000      POTASSIUM 3.7   CR 0.76        Diagnostics:  No labs were ordered during this visit.   No EKG required, no history of coronary heart disease, significant arrhythmia, peripheral arterial disease or other structural heart disease.    Revised Cardiac Risk Index (RCRI):  The patient has " the following serious cardiovascular risks for perioperative complications:   - No serious cardiac risks = 0 points     RCRI Interpretation: 0 points: Class I (very low risk - 0.4% complication rate)           Signed Electronically by: PACHECO Britton CNP  Copy of this evaluation report is provided to requesting physician.

## 2022-05-09 NOTE — PATIENT INSTRUCTIONS
Preparing for Your Surgery  Getting started  A nurse will call you to review your health history and instructions. They will give you an arrival time based on your scheduled surgery time. Please be ready to share:    Your doctor's clinic name and phone number    Your medical, surgical and anesthesia history    A list of allergies and sensitivities    A list of medicines, including herbal treatments and over-the-counter drugs    Whether the patient has a legal guardian (ask how to send us the papers in advance)  Please tell us if you're pregnant--or if there's any chance you might be pregnant. Some surgeries may injure a fetus (unborn baby), so they require a pregnancy test. Surgeries that are safe for a fetus don't always need a test, and you can choose whether to have one.   If you have a child who's having surgery, please ask for a copy of Preparing for Your Child's Surgery.    Preparing for surgery    Within 30 days of surgery: Have a pre-op exam (sometimes called an H&P, or History and Physical). This can be done at a clinic or pre-operative center.  ? If you're having a , you may not need this exam. Talk to your care team.    At your pre-op exam, talk to your care team about all medicines you take. If you need to stop any medicines before surgery, ask when to start taking them again.  ? We do this for your safety. Many medicines can make you bleed too much during surgery. Some change how well surgery (anesthesia) drugs work.    Call your insurance company to let them know you're having surgery. (If you don't have insurance, call [phone].)    Call your clinic if there's any change in your health. This includes signs of a cold or flu (sore throat, runny nose, cough, rash, fever). It also includes a scrape or scratch near the surgery site.    If you have questions on the day of surgery, call your hospital or surgery center.  COVID testing  You may need to be tested for COVID-19 before having  surgery. If so, we will give you instructions.  Eating and drinking guidelines  For your safety: Unless your surgeon tells you otherwise, follow the guidelines below.    Eat and drink as usual until 8 hours before surgery. After that, no food or milk.    Drink clear liquids until 2 hours before surgery. These are liquids you can see through, like water, Gatorade and Propel Water. You may also have black coffee and tea (no cream or milk).    Nothing by mouth within 2 hours of surgery. This includes gum, candy and breath mints.    If you drink alcohol: Stop drinking it the night before surgery.    If your care team tells you to take medicine on the morning of surgery, it's okay to take it with a sip of water.  Preventing infection    Shower or bathe the night before and morning of your surgery. Follow the instructions your clinic gave you. (If no instructions, use regular soap.)    Don't shave or clip hair near your surgery site. We'll remove the hair if needed.    Don't smoke or vape the morning of surgery. You may chew nicotine gum up to 2 hours before surgery. A nicotine patch is okay.  ? Note: Some surgeries require you to completely quit smoking and nicotine. Check with your surgeon.    Your care team will make every effort to keep you safe from infection. We will:  ? Clean our hands often with soap and water (or an alcohol-based hand rub).  ? Clean the skin at your surgery site with a special soap that kills germs.  ? Give you a special gown to keep you warm. (Cold raises the risk of infection.)  ? Wear special hair covers, masks, gowns and gloves during surgery.  ? Give antibiotic medicine, if prescribed. Not all surgeries need antibiotics.  What to bring on the day of surgery    Photo ID and insurance card    Copy of your health care directive, if you have one    Glasses and hearing aides (bring cases)  ? You can't wear contacts during surgery    Inhaler and eye drops, if you use them (tell us about these when  you arrive)    CPAP machine or breathing device, if you use them    A few personal items, if spending the night    If you have . . .  ? A pacemaker, ICD (cardiac defibrillator) or other implant: Bring the ID card.  ? An implanted stimulator: Bring the remote control.  ? A legal guardian: Bring a copy of the certified (court-stamped) guardianship papers.  Please remove any jewelry, including body piercings. Leave jewelry and other valuables at home.  If you're going home the day of surgery    You must have a responsible adult drive you home. They should stay with you overnight as well.    If you don't have someone to stay with you, and you aren't safe to go home alone, we may keep you overnight. Insurance often won't pay for this.  After surgery  If it's hard to control your pain or you need more pain medicine, please call your surgeon's office.  Questions?   If you have any questions for your care team, list them here: _________________________________________________________________________________________________________________________________________________________________________ ____________________________________ ____________________________________ ____________________________________  For informational purposes only. Not to replace the advice of your health care provider. Copyright   2003, 2019 [facility]. All rights reserved. Clinically reviewed by Deja Stokes MD. [employer] [id number] - REV 07/21.

## 2022-05-09 NOTE — H&P (VIEW-ONLY)
[facility]  [address] [place] [id number]  Phone: [phone]  Fax: [phone]  Primary Provider: Latasha Patel  Pre-op Performing Provider: LATASHA PATEL    PREOPERATIVE EVALUATION:  Today's date: 5/9/2022    Sharron Shipley is a 51 year old female who presents for a preoperative evaluation.    Surgical Information:  Surgery/Procedure: Transvaginal hysterectomy, bilateral salpingo-oopherectomy, cystoscopy, excision of skin tag  Surgery Location: [facility] OR  Surgeon: Dr. Trini Schultz  Surgery Date: 06/07/2022  Time of Surgery: 7:30 AM  Where patient plans to recover: At home with family  Fax number for surgical facility: Note does not need to be faxed, will be available electronically in Epic.    Type of Anesthesia Anticipated: General    Assessment & Plan     The proposed surgical procedure is considered INTERMEDIATE risk.    Preop general physical exam    High grade squamous intraepithelial cervical dysplasia    Cervical high risk HPV (human papillomavirus) test positive    Mild major depression (H)    - FLUoxetine (PROZAC) 10 MG capsule; Take 1 capsule (10 mg) by mouth daily    Anxiety    - FLUoxetine (PROZAC) 10 MG capsule; Take 1 capsule (10 mg) by mouth daily       Risks and Recommendations:  The patient has the following additional risks and recommendations for perioperative complications:   - No identified additional risk factors other than previously addressed    Medication Instructions:  Patient is to take all scheduled medications on the day of surgery    RECOMMENDATION:  APPROVAL GIVEN to proceed with proposed procedure, without further diagnostic evaluation.      Subjective     HPI related to upcoming procedure: high grade squamous intraepithelial cervical dysplasia in need of hysterectomy     Preop Questions 5/3/2022   1. Have you ever had a heart attack or stroke? No   2. Have you ever had surgery on your heart or blood vessels, such  as a stent placement, a coronary artery bypass, or surgery on an artery in your head, neck, heart, or legs? No   3. Do you have chest pain with activity? No   4. Do you have a history of  heart failure? No   5. Do you currently have a cold, bronchitis or symptoms of other infection? No   6. Do you have a cough, shortness of breath, or wheezing? No   7. Do you or anyone in your family have previous history of blood clots? No   8. Do you or does anyone in your family have a serious bleeding problem such as prolonged bleeding following surgeries or cuts? No   9. Have you ever had problems with anemia or been told to take iron pills? No   10. Have you had any abnormal blood loss such as black, tarry or bloody stools, or abnormal vaginal bleeding? No   11. Have you ever had a blood transfusion? No   12. Are you willing to have a blood transfusion if it is medically needed before, during, or after your surgery? Yes   13. Have you or any of your relatives ever had problems with anesthesia? No   14. Do you have sleep apnea, excessive snoring or daytime drowsiness? No   15. Do you have any artifical heart valves or other implanted medical devices like a pacemaker, defibrillator, or continuous glucose monitor? No   16. Do you have artificial joints? No   17. Are you allergic to latex? No   18. Is there any chance that you may be pregnant? No       Health Care Directive:  Patient does not have a Health Care Directive or Living Will: Discussed advance care planning with patient; however, patient declined at this time.    Preoperative Review of :   reviewed - no record of controlled substances prescribed.      Review of Systems  CONSTITUTIONAL: NEGATIVE for fever, chills, change in weight  INTEGUMENTARY/SKIN: NEGATIVE for worrisome rashes, moles or lesions  EYES: NEGATIVE for vision changes or irritation  ENT/MOUTH: NEGATIVE for ear, mouth and throat problems  RESP: NEGATIVE for significant cough or SOB  CV: NEGATIVE for  chest pain, palpitations or peripheral edema  GI: NEGATIVE for nausea, abdominal pain, heartburn, or change in bowel habits  : NEGATIVE for frequency, dysuria, or hematuria  MUSCULOSKELETAL: NEGATIVE for significant arthralgias or myalgia  NEURO: NEGATIVE for weakness, dizziness or paresthesias  ENDOCRINE: NEGATIVE for temperature intolerance, skin/hair changes  HEME: NEGATIVE for bleeding problems  PSYCHIATRIC: NEGATIVE for changes in mood or affect    Patient Active Problem List    Diagnosis Date Noted     Cervical high risk HPV (human papillomavirus) test positive 09/30/2020     Priority: Medium     2006, 2007, 2008, 2012 NIL Paps (Care Everywhere)  2017 NIL Pap, Neg HPV (Care Everywhere)  9/23/20 NIL Pap, + HR HPV (neg 16/18). Cotest due in 1 year.   10/8/21 NIL Pap, + HR HPV (neg 16/18). [place] due by 1/8/2022.    3/3/22 Colpo bx SHAWN I, focal SHAWN II-III. Plan: LEEP due before 6/3/22  4/25/22 LEEP appt           Mild major depression (H) 09/29/2020     Priority: Medium     Anxiety 09/29/2020     Priority: Medium     Recurrent cold sores 09/29/2020     Priority: Medium     Restless leg syndrome 09/29/2020     Priority: Medium      Past Medical History:   Diagnosis Date     Cervical high risk HPV (human papillomavirus) test positive 2020 2021     Past Surgical History:   Procedure Laterality Date     ESOPHAGOSCOPY, GASTROSCOPY, DUODENOSCOPY (EGD), DILATATION, COMBINED N/A 5/10/2021    Procedure: ESOPHAGOGASTRODUODENOSCOPY, WITH DILATION of lower esophageal sphincter;  Surgeon: Valentin Taylor MD;  Location: [facility]     Current Outpatient Medications   Medication Sig Dispense Refill     atorvastatin (LIPITOR) 20 MG tablet Take 1 tablet by mouth once daily 30 tablet 8     cyclobenzaprine (FLEXERIL) 10 MG tablet Take 1 tablet by mouth three times daily as needed for muscle spasm 30 tablet 3     FLUoxetine (PROZAC) 10 MG capsule Take 1 capsule (10 mg) by mouth daily 90 capsule 3     Multiple Vitamin (MULTIVITAMIN  "ADULT PO)        valACYclovir (VALTREX) 1000 mg tablet Take 2 tablets by mouth twice daily 8 tablet 3     valACYclovir (VALTREX) 500 MG tablet Take 1 tablet by mouth once daily 30 tablet 8     vitamin B complex with vitamin C (STRESS TAB) tablet Take 1 tablet by mouth daily       VITAMIN D PO          No Known Allergies     Social History     Tobacco Use     Smoking status: Never Smoker     Smokeless tobacco: Never Used   Substance Use Topics     Alcohol use: Not Currently     Comment: rare     History   Drug Use Unknown         Objective     /86 (BP Location: Right arm, Patient Position: Sitting, Cuff Size: Adult Regular)   Pulse 80   Temp 97.6  F (36.4  C) (Tympanic)   Resp 20   Ht 1.651 m (5' 5\")   Wt 76.5 kg (168 lb 9.6 oz)   LMP  (LMP Unknown)   SpO2 99%   Breastfeeding No   BMI 28.06 kg/m      Physical Exam    GENERAL APPEARANCE: healthy, alert and no distress     EYES: EOMI, PERRL     HENT: ear canals and TM's normal and nose and mouth without ulcers or lesions     NECK: no adenopathy, no asymmetry, masses, or scars and thyroid normal to palpation     RESP: lungs clear to auscultation - no rales, rhonchi or wheezes     CV: regular rates and rhythm, normal S1 S2, no S3 or S4 and no murmur, click or rub     ABDOMEN:  soft, nontender, no HSM or masses and bowel sounds normal     MS: extremities normal- no gross deformities noted     SKIN: no suspicious lesions or rashes     NEURO: Normal strength and tone, sensory exam grossly normal, mentation intact and speech normal     PSYCH: mentation appears normal. and affect normal/bright     LYMPHATICS: No cervical adenopathy    Recent Labs   Lab Test 11/16/21  1000      POTASSIUM 3.7   CR 0.76        Diagnostics:  No labs were ordered during this visit.   No EKG required, no history of coronary heart disease, significant arrhythmia, peripheral arterial disease or other structural heart disease.    Revised Cardiac Risk Index (RCRI):  The patient has " the following serious cardiovascular risks for perioperative complications:   - No serious cardiac risks = 0 points     RCRI Interpretation: 0 points: Class I (very low risk - 0.4% complication rate)           Signed Electronically by: PACHECO Britton CNP  Copy of this evaluation report is provided to requesting physician.

## 2022-05-10 ASSESSMENT — ANXIETY QUESTIONNAIRES: GAD7 TOTAL SCORE: 0

## 2022-05-17 ENCOUNTER — PATIENT OUTREACH (OUTPATIENT)
Dept: OBGYN | Facility: CLINIC | Age: 52
End: 2022-05-17
Payer: COMMERCIAL

## 2022-05-20 ENCOUNTER — TELEPHONE (OUTPATIENT)
Dept: OBGYN | Facility: CLINIC | Age: 52
End: 2022-05-20

## 2022-05-23 ENCOUNTER — MYC MEDICAL ADVICE (OUTPATIENT)
Dept: OBGYN | Facility: CLINIC | Age: 52
End: 2022-05-23
Payer: COMMERCIAL

## 2022-05-23 NOTE — TELEPHONE ENCOUNTER
Question re: forms    Routed to [facility] [place]    Samantha Krishnamurthy RN on 5/23/2022 at 3:13 PM

## 2022-05-24 NOTE — TELEPHONE ENCOUNTER
Paperwork completed and signed by Dr. Schultz. Pt wants to  forms from clinic. Forms are in drawer up front in clinic. Pt notified that forms are ready for  via my-chart.      Chyna Wallace   Clinic Station    [facility]  [phone]

## 2022-05-27 DIAGNOSIS — Z11.59 ENCOUNTER FOR SCREENING FOR OTHER VIRAL DISEASES: Primary | ICD-10-CM

## 2022-06-03 ENCOUNTER — LAB (OUTPATIENT)
Dept: LAB | Facility: CLINIC | Age: 52
End: 2022-06-03
Payer: COMMERCIAL

## 2022-06-03 DIAGNOSIS — Z11.59 ENCOUNTER FOR SCREENING FOR OTHER VIRAL DISEASES: ICD-10-CM

## 2022-06-03 PROBLEM — E78.00 HIGH CHOLESTEROL: Status: ACTIVE | Noted: 2022-06-03

## 2022-06-03 PROCEDURE — U0005 INFEC AGEN DETEC AMPLI PROBE: HCPCS

## 2022-06-03 PROCEDURE — U0003 INFECTIOUS AGENT DETECTION BY NUCLEIC ACID (DNA OR RNA); SEVERE ACUTE RESPIRATORY SYNDROME CORONAVIRUS 2 (SARS-COV-2) (CORONAVIRUS DISEASE [COVID-19]), AMPLIFIED PROBE TECHNIQUE, MAKING USE OF HIGH THROUGHPUT TECHNOLOGIES AS DESCRIBED BY CMS-2020-01-R: HCPCS

## 2022-06-04 LAB — SARS-COV-2 RNA RESP QL NAA+PROBE: NEGATIVE

## 2022-06-06 ENCOUNTER — ANESTHESIA EVENT (OUTPATIENT)
Dept: SURGERY | Facility: CLINIC | Age: 52
End: 2022-06-06
Payer: COMMERCIAL

## 2022-06-06 NOTE — ANESTHESIA PREPROCEDURE EVALUATION
Anesthesia Pre-Procedure Evaluation    Patient: Sharron Shipley   MRN: [id number] : 1970        Procedure : Procedure(s):  Transvaginal hysterectomy, bilateral salpingo-oophorectomy, cystoscopy ,Excision of Skin Tag          Past Medical History:   Diagnosis Date     Cervical high risk HPV (human papillomavirus) test positive 2020     High cholesterol       Past Surgical History:   Procedure Laterality Date     ESOPHAGOSCOPY, GASTROSCOPY, DUODENOSCOPY (EGD), DILATATION, COMBINED N/A 5/10/2021    Procedure: ESOPHAGOGASTRODUODENOSCOPY, WITH DILATION of lower esophageal sphincter;  Surgeon: Valentin Taylor MD;  Location: [facility]      No Known Allergies   Social History     Tobacco Use     Smoking status: Never Smoker     Smokeless tobacco: Never Used   Substance Use Topics     Alcohol use: Not Currently     Comment: rare      Wt Readings from Last 1 Encounters:   22 76.5 kg (168 lb 9.6 oz)        Anesthesia Evaluation            ROS/MED HX  ENT/Pulmonary:       Neurologic:       Cardiovascular:     (+) Dyslipidemia -----    METS/Exercise Tolerance:     Hematologic:       Musculoskeletal: Comment: RLS      GI/Hepatic:       Renal/Genitourinary:       Endo:       Psychiatric/Substance Use:     (+) psychiatric history depression and anxiety     Infectious Disease:       Malignancy:       Other: Comment: Cervical dysplasia  HPV           Physical Exam    Airway        Mallampati: II   TM distance: > 3 FB   Neck ROM: full   Mouth opening: > 3 cm    Respiratory Devices and Support         Dental  no notable dental history         Cardiovascular   cardiovascular exam normal          Pulmonary   pulmonary exam normal                OUTSIDE LABS:  CBC: No results found for: WBC, HGB, HCT, PLT  BMP:   Lab Results   Component Value Date     2021    POTASSIUM 3.7 2021    CHLORIDE 107 2021    CO2 32 2021    BUN 10 2021    CR 0.76 2021    GLC 98 2021      (H) 09/23/2020     COAGS: No results found for: PTT, INR, FIBR  POC: No results found for: BGM, HCG, HCGS  HEPATIC:   Lab Results   Component Value Date    ALBUMIN 3.8 11/16/2021    PROTTOTAL 7.5 11/16/2021    ALT 28 11/16/2021    AST 24 11/16/2021    ALKPHOS 82 11/16/2021    BILITOTAL 0.7 11/16/2021     OTHER:   Lab Results   Component Value Date    AMISH 9.0 11/16/2021    TSH 2.40 09/23/2020       Anesthesia Plan    ASA Status:  2   NPO Status:  NPO Appropriate    Anesthesia Type: General.     - Airway: ETT   Induction: Intravenous.   Maintenance: Balanced.        Consents    Anesthesia Plan(s) and associated risks, benefits, and realistic alternatives discussed. Questions answered and patient/representative(s) expressed understanding.    - Discussed:     - Discussed with:  Patient         Postoperative Care            Comments:                PACHECO Alexander CRNA

## 2022-06-07 ENCOUNTER — ANESTHESIA (OUTPATIENT)
Dept: SURGERY | Facility: CLINIC | Age: 52
End: 2022-06-07
Payer: COMMERCIAL

## 2022-06-07 ENCOUNTER — HOSPITAL ENCOUNTER (OUTPATIENT)
Facility: CLINIC | Age: 52
Discharge: HOME | End: 2022-06-07
Attending: OBSTETRICS & GYNECOLOGY | Admitting: OBSTETRICS & GYNECOLOGY
Payer: COMMERCIAL

## 2022-06-07 VITALS
TEMPERATURE: 97.6 F | BODY MASS INDEX: 27.99 KG/M2 | WEIGHT: 168 LBS | RESPIRATION RATE: 15 BRPM | SYSTOLIC BLOOD PRESSURE: 130 MMHG | HEART RATE: 78 BPM | HEIGHT: 65 IN | OXYGEN SATURATION: 96 % | DIASTOLIC BLOOD PRESSURE: 72 MMHG

## 2022-06-07 DIAGNOSIS — Z90.710 S/P VAGINAL HYSTERECTOMY: Primary | ICD-10-CM

## 2022-06-07 LAB
ABO/RH(D): NORMAL
ANION GAP SERPL CALCULATED.3IONS-SCNC: 4 MMOL/L (ref 3–14)
ANTIBODY SCREEN: NEGATIVE
BASOPHILS # BLD AUTO: 0 10E3/UL (ref 0–0.2)
BASOPHILS NFR BLD AUTO: 1 %
BUN SERPL-MCNC: 15 MG/DL (ref 7–30)
CALCIUM SERPL-MCNC: 8.5 MG/DL (ref 8.5–10.1)
CHLORIDE BLD-SCNC: 109 MMOL/L (ref 94–109)
CO2 SERPL-SCNC: 30 MMOL/L (ref 20–32)
CREAT SERPL-MCNC: 0.87 MG/DL (ref 0.52–1.04)
EOSINOPHIL # BLD AUTO: 0.1 10E3/UL (ref 0–0.7)
EOSINOPHIL NFR BLD AUTO: 1 %
ERYTHROCYTE [DISTWIDTH] IN BLOOD BY AUTOMATED COUNT: 12.1 % (ref 10–15)
GFR SERPL CREATININE-BSD FRML MDRD: 80 ML/MIN/1.73M2
GLUCOSE BLD-MCNC: 105 MG/DL (ref 70–99)
HCG UR QL: NEGATIVE
HCT VFR BLD AUTO: 38.3 % (ref 35–47)
HGB BLD-MCNC: 12.9 G/DL (ref 11.7–15.7)
IMM GRANULOCYTES # BLD: 0 10E3/UL
IMM GRANULOCYTES NFR BLD: 0 %
LYMPHOCYTES # BLD AUTO: 1.6 10E3/UL (ref 0.8–5.3)
LYMPHOCYTES NFR BLD AUTO: 38 %
MCH RBC QN AUTO: 30.5 PG (ref 26.5–33)
MCHC RBC AUTO-ENTMCNC: 33.7 G/DL (ref 31.5–36.5)
MCV RBC AUTO: 91 FL (ref 78–100)
MONOCYTES # BLD AUTO: 0.3 10E3/UL (ref 0–1.3)
MONOCYTES NFR BLD AUTO: 8 %
NEUTROPHILS # BLD AUTO: 2.2 10E3/UL (ref 1.6–8.3)
NEUTROPHILS NFR BLD AUTO: 52 %
NRBC # BLD AUTO: 0 10E3/UL
NRBC BLD AUTO-RTO: 0 /100
PLATELET # BLD AUTO: 200 10E3/UL (ref 150–450)
POTASSIUM BLD-SCNC: 3.8 MMOL/L (ref 3.4–5.3)
RBC # BLD AUTO: 4.23 10E6/UL (ref 3.8–5.2)
SODIUM SERPL-SCNC: 143 MMOL/L (ref 133–144)
SPECIMEN EXPIRATION DATE: NORMAL
WBC # BLD AUTO: 4.3 10E3/UL (ref 4–11)

## 2022-06-07 PROCEDURE — 250N000011 HC RX IP 250 OP 636: Performed by: OBSTETRICS & GYNECOLOGY

## 2022-06-07 PROCEDURE — 58262 VAG HYST INCLUDING T/O: CPT | Performed by: OBSTETRICS & GYNECOLOGY

## 2022-06-07 PROCEDURE — 250N000009 HC RX 250: Performed by: OBSTETRICS & GYNECOLOGY

## 2022-06-07 PROCEDURE — 258N000003 HC RX IP 258 OP 636: Performed by: NURSE ANESTHETIST, CERTIFIED REGISTERED

## 2022-06-07 PROCEDURE — 999N000141 HC STATISTIC PRE-PROCEDURE NURSING ASSESSMENT: Performed by: OBSTETRICS & GYNECOLOGY

## 2022-06-07 PROCEDURE — 250N000011 HC RX IP 250 OP 636: Performed by: NURSE ANESTHETIST, CERTIFIED REGISTERED

## 2022-06-07 PROCEDURE — 250N000013 HC RX MED GY IP 250 OP 250 PS 637: Performed by: OBSTETRICS & GYNECOLOGY

## 2022-06-07 PROCEDURE — 250N000025 HC SEVOFLURANE, PER MIN: Performed by: OBSTETRICS & GYNECOLOGY

## 2022-06-07 PROCEDURE — 86901 BLOOD TYPING SEROLOGIC RH(D): CPT | Performed by: OBSTETRICS & GYNECOLOGY

## 2022-06-07 PROCEDURE — 81025 URINE PREGNANCY TEST: CPT | Performed by: NURSE ANESTHETIST, CERTIFIED REGISTERED

## 2022-06-07 PROCEDURE — 710N000012 HC RECOVERY PHASE 2, PER MINUTE: Performed by: OBSTETRICS & GYNECOLOGY

## 2022-06-07 PROCEDURE — 370N000017 HC ANESTHESIA TECHNICAL FEE, PER MIN: Performed by: OBSTETRICS & GYNECOLOGY

## 2022-06-07 PROCEDURE — 360N000076 HC SURGERY LEVEL 3, PER MIN: Performed by: OBSTETRICS & GYNECOLOGY

## 2022-06-07 PROCEDURE — 710N000010 HC RECOVERY PHASE 1, LEVEL 2, PER MIN: Performed by: OBSTETRICS & GYNECOLOGY

## 2022-06-07 PROCEDURE — 250N000009 HC RX 250: Performed by: NURSE ANESTHETIST, CERTIFIED REGISTERED

## 2022-06-07 PROCEDURE — 85025 COMPLETE CBC W/AUTO DIFF WBC: CPT | Performed by: OBSTETRICS & GYNECOLOGY

## 2022-06-07 PROCEDURE — 88305 TISSUE EXAM BY PATHOLOGIST: CPT | Mod: 26 | Performed by: PATHOLOGY

## 2022-06-07 PROCEDURE — 271N000001 HC OR GENERAL SUPPLY NON-STERILE: Performed by: OBSTETRICS & GYNECOLOGY

## 2022-06-07 PROCEDURE — 36415 COLL VENOUS BLD VENIPUNCTURE: CPT | Performed by: OBSTETRICS & GYNECOLOGY

## 2022-06-07 PROCEDURE — 11200 RMVL SKIN TAGS UP TO&INC 15: CPT | Performed by: OBSTETRICS & GYNECOLOGY

## 2022-06-07 PROCEDURE — 272N000001 HC OR GENERAL SUPPLY STERILE: Performed by: OBSTETRICS & GYNECOLOGY

## 2022-06-07 PROCEDURE — 88307 TISSUE EXAM BY PATHOLOGIST: CPT | Mod: 26 | Performed by: PATHOLOGY

## 2022-06-07 PROCEDURE — 250N000013 HC RX MED GY IP 250 OP 250 PS 637: Performed by: NURSE ANESTHETIST, CERTIFIED REGISTERED

## 2022-06-07 PROCEDURE — 58262 VAG HYST INCLUDING T/O: CPT | Mod: AS | Performed by: PHYSICIAN ASSISTANT

## 2022-06-07 PROCEDURE — 88305 TISSUE EXAM BY PATHOLOGIST: CPT | Mod: TC | Performed by: OBSTETRICS & GYNECOLOGY

## 2022-06-07 PROCEDURE — 80048 BASIC METABOLIC PNL TOTAL CA: CPT | Performed by: OBSTETRICS & GYNECOLOGY

## 2022-06-07 RX ORDER — ACETAMINOPHEN 325 MG/1
975 TABLET ORAL ONCE
Status: DISCONTINUED | OUTPATIENT
Start: 2022-06-07 | End: 2022-06-07

## 2022-06-07 RX ORDER — NALOXONE HYDROCHLORIDE 0.4 MG/ML
0.4 INJECTION, SOLUTION INTRAMUSCULAR; INTRAVENOUS; SUBCUTANEOUS
Status: DISCONTINUED | OUTPATIENT
Start: 2022-06-07 | End: 2022-06-07 | Stop reason: HOSPADM

## 2022-06-07 RX ORDER — SODIUM CHLORIDE, SODIUM LACTATE, POTASSIUM CHLORIDE, CALCIUM CHLORIDE 600; 310; 30; 20 MG/100ML; MG/100ML; MG/100ML; MG/100ML
INJECTION, SOLUTION INTRAVENOUS CONTINUOUS
Status: DISCONTINUED | OUTPATIENT
Start: 2022-06-07 | End: 2022-06-07 | Stop reason: HOSPADM

## 2022-06-07 RX ORDER — OXYCODONE HYDROCHLORIDE 5 MG/1
5-10 TABLET ORAL
Status: DISCONTINUED | OUTPATIENT
Start: 2022-06-07 | End: 2022-06-07 | Stop reason: HOSPADM

## 2022-06-07 RX ORDER — DEXAMETHASONE SODIUM PHOSPHATE 4 MG/ML
INJECTION, SOLUTION INTRA-ARTICULAR; INTRALESIONAL; INTRAMUSCULAR; INTRAVENOUS; SOFT TISSUE PRN
Status: DISCONTINUED | OUTPATIENT
Start: 2022-06-07 | End: 2022-06-07

## 2022-06-07 RX ORDER — ALBUTEROL SULFATE 0.83 MG/ML
2.5 SOLUTION RESPIRATORY (INHALATION)
Status: DISCONTINUED | OUTPATIENT
Start: 2022-06-07 | End: 2022-06-07 | Stop reason: HOSPADM

## 2022-06-07 RX ORDER — FENTANYL CITRATE 50 UG/ML
25 INJECTION, SOLUTION INTRAMUSCULAR; INTRAVENOUS
Status: DISCONTINUED | OUTPATIENT
Start: 2022-06-07 | End: 2022-06-07 | Stop reason: HOSPADM

## 2022-06-07 RX ORDER — OXYCODONE HYDROCHLORIDE 5 MG/1
5-10 TABLET ORAL EVERY 4 HOURS PRN
Qty: 20 TABLET | Refills: 0 | Status: SHIPPED | OUTPATIENT
Start: 2022-06-07 | End: 2022-06-09

## 2022-06-07 RX ORDER — FENTANYL CITRATE 50 UG/ML
INJECTION, SOLUTION INTRAMUSCULAR; INTRAVENOUS PRN
Status: DISCONTINUED | OUTPATIENT
Start: 2022-06-07 | End: 2022-06-07

## 2022-06-07 RX ORDER — NALOXONE HYDROCHLORIDE 0.4 MG/ML
0.2 INJECTION, SOLUTION INTRAMUSCULAR; INTRAVENOUS; SUBCUTANEOUS
Status: DISCONTINUED | OUTPATIENT
Start: 2022-06-07 | End: 2022-06-07 | Stop reason: HOSPADM

## 2022-06-07 RX ORDER — ONDANSETRON 2 MG/ML
4 INJECTION INTRAMUSCULAR; INTRAVENOUS EVERY 30 MIN PRN
Status: DISCONTINUED | OUTPATIENT
Start: 2022-06-07 | End: 2022-06-07 | Stop reason: HOSPADM

## 2022-06-07 RX ORDER — GABAPENTIN 300 MG/1
300 CAPSULE ORAL
Status: COMPLETED | OUTPATIENT
Start: 2022-06-07 | End: 2022-06-07

## 2022-06-07 RX ORDER — ACETAMINOPHEN 325 MG/1
975 TABLET ORAL ONCE
Status: DISCONTINUED | OUTPATIENT
Start: 2022-06-07 | End: 2022-06-07 | Stop reason: HOSPADM

## 2022-06-07 RX ORDER — LIDOCAINE HYDROCHLORIDE AND EPINEPHRINE 10; 10 MG/ML; UG/ML
INJECTION, SOLUTION INFILTRATION; PERINEURAL PRN
Status: DISCONTINUED | OUTPATIENT
Start: 2022-06-07 | End: 2022-06-07 | Stop reason: HOSPADM

## 2022-06-07 RX ORDER — PHENAZOPYRIDINE HYDROCHLORIDE 200 MG/1
200 TABLET, FILM COATED ORAL ONCE
Status: COMPLETED | OUTPATIENT
Start: 2022-06-07 | End: 2022-06-07

## 2022-06-07 RX ORDER — TRANEXAMIC ACID 10 MG/ML
1 INJECTION, SOLUTION INTRAVENOUS ONCE
Status: COMPLETED | OUTPATIENT
Start: 2022-06-07 | End: 2022-06-07

## 2022-06-07 RX ORDER — CEFAZOLIN SODIUM/WATER 2 G/20 ML
2 SYRINGE (ML) INTRAVENOUS
Status: DISCONTINUED | OUTPATIENT
Start: 2022-06-07 | End: 2022-06-07 | Stop reason: HOSPADM

## 2022-06-07 RX ORDER — CEFAZOLIN SODIUM/WATER 2 G/20 ML
2 SYRINGE (ML) INTRAVENOUS SEE ADMIN INSTRUCTIONS
Status: DISCONTINUED | OUTPATIENT
Start: 2022-06-07 | End: 2022-06-07 | Stop reason: HOSPADM

## 2022-06-07 RX ORDER — ACETAMINOPHEN 325 MG/1
975 TABLET ORAL EVERY 6 HOURS PRN
Qty: 50 TABLET | Refills: 0 | Status: SHIPPED | OUTPATIENT
Start: 2022-06-07 | End: 2023-01-30

## 2022-06-07 RX ORDER — OXYCODONE HYDROCHLORIDE 5 MG/1
5 TABLET ORAL EVERY 4 HOURS PRN
Status: DISCONTINUED | OUTPATIENT
Start: 2022-06-07 | End: 2022-06-07 | Stop reason: HOSPADM

## 2022-06-07 RX ORDER — MEPERIDINE HYDROCHLORIDE 25 MG/ML
12.5 INJECTION INTRAMUSCULAR; INTRAVENOUS; SUBCUTANEOUS
Status: DISCONTINUED | OUTPATIENT
Start: 2022-06-07 | End: 2022-06-07 | Stop reason: HOSPADM

## 2022-06-07 RX ORDER — ONDANSETRON 4 MG/1
4 TABLET, ORALLY DISINTEGRATING ORAL EVERY 30 MIN PRN
Status: DISCONTINUED | OUTPATIENT
Start: 2022-06-07 | End: 2022-06-07 | Stop reason: HOSPADM

## 2022-06-07 RX ORDER — AMOXICILLIN 250 MG
1-2 CAPSULE ORAL 2 TIMES DAILY
Qty: 30 TABLET | Refills: 0 | Status: SHIPPED | OUTPATIENT
Start: 2022-06-07 | End: 2023-01-30

## 2022-06-07 RX ORDER — HYDROMORPHONE HCL IN WATER/PF 6 MG/30 ML
0.2 PATIENT CONTROLLED ANALGESIA SYRINGE INTRAVENOUS EVERY 5 MIN PRN
Status: DISCONTINUED | OUTPATIENT
Start: 2022-06-07 | End: 2022-06-07 | Stop reason: HOSPADM

## 2022-06-07 RX ORDER — PROPOFOL 10 MG/ML
INJECTION, EMULSION INTRAVENOUS PRN
Status: DISCONTINUED | OUTPATIENT
Start: 2022-06-07 | End: 2022-06-07

## 2022-06-07 RX ORDER — IBUPROFEN 800 MG/1
800 TABLET, FILM COATED ORAL ONCE
Status: DISCONTINUED | OUTPATIENT
Start: 2022-06-07 | End: 2022-06-07 | Stop reason: HOSPADM

## 2022-06-07 RX ORDER — FENTANYL CITRATE 50 UG/ML
25 INJECTION, SOLUTION INTRAMUSCULAR; INTRAVENOUS EVERY 5 MIN PRN
Status: DISCONTINUED | OUTPATIENT
Start: 2022-06-07 | End: 2022-06-07 | Stop reason: HOSPADM

## 2022-06-07 RX ORDER — ONDANSETRON 2 MG/ML
INJECTION INTRAMUSCULAR; INTRAVENOUS PRN
Status: DISCONTINUED | OUTPATIENT
Start: 2022-06-07 | End: 2022-06-07

## 2022-06-07 RX ORDER — ACETAMINOPHEN 325 MG/1
975 TABLET ORAL ONCE
Status: COMPLETED | OUTPATIENT
Start: 2022-06-07 | End: 2022-06-07

## 2022-06-07 RX ORDER — LIDOCAINE 40 MG/G
CREAM TOPICAL
Status: DISCONTINUED | OUTPATIENT
Start: 2022-06-07 | End: 2022-06-07 | Stop reason: HOSPADM

## 2022-06-07 RX ADMIN — DEXAMETHASONE SODIUM PHOSPHATE 4 MG: 4 INJECTION, SOLUTION INTRA-ARTICULAR; INTRALESIONAL; INTRAMUSCULAR; INTRAVENOUS; SOFT TISSUE at 08:54

## 2022-06-07 RX ADMIN — PHENAZOPYRIDINE HYDROCHLORIDE 200 MG: 200 TABLET ORAL at 06:24

## 2022-06-07 RX ADMIN — SODIUM CHLORIDE, POTASSIUM CHLORIDE, SODIUM LACTATE AND CALCIUM CHLORIDE: 600; 310; 30; 20 INJECTION, SOLUTION INTRAVENOUS at 06:41

## 2022-06-07 RX ADMIN — GABAPENTIN 300 MG: 300 CAPSULE ORAL at 06:24

## 2022-06-07 RX ADMIN — FENTANYL CITRATE 100 MCG: 50 INJECTION, SOLUTION INTRAMUSCULAR; INTRAVENOUS at 07:29

## 2022-06-07 RX ADMIN — OXYCODONE HYDROCHLORIDE 5 MG: 5 TABLET ORAL at 11:30

## 2022-06-07 RX ADMIN — Medication 2 G: at 07:29

## 2022-06-07 RX ADMIN — ACETAMINOPHEN 975 MG: 325 TABLET, FILM COATED ORAL at 06:24

## 2022-06-07 RX ADMIN — FENTANYL CITRATE 150 MCG: 50 INJECTION, SOLUTION INTRAMUSCULAR; INTRAVENOUS at 08:20

## 2022-06-07 RX ADMIN — ONDANSETRON 4 MG: 2 INJECTION INTRAMUSCULAR; INTRAVENOUS at 08:54

## 2022-06-07 RX ADMIN — SODIUM CHLORIDE, POTASSIUM CHLORIDE, SODIUM LACTATE AND CALCIUM CHLORIDE: 600; 310; 30; 20 INJECTION, SOLUTION INTRAVENOUS at 09:53

## 2022-06-07 RX ADMIN — ROCURONIUM BROMIDE 30 MG: 50 INJECTION, SOLUTION INTRAVENOUS at 07:35

## 2022-06-07 RX ADMIN — LIDOCAINE HYDROCHLORIDE 5 ML: 10 INJECTION, SOLUTION EPIDURAL; INFILTRATION; INTRACAUDAL; PERINEURAL at 07:35

## 2022-06-07 RX ADMIN — FENTANYL CITRATE 25 MCG: 50 INJECTION, SOLUTION INTRAMUSCULAR; INTRAVENOUS at 13:59

## 2022-06-07 RX ADMIN — TRANEXAMIC ACID 1 G: 10 INJECTION, SOLUTION INTRAVENOUS at 07:41

## 2022-06-07 RX ADMIN — OXYCODONE HYDROCHLORIDE 5 MG: 5 TABLET ORAL at 14:40

## 2022-06-07 RX ADMIN — LIDOCAINE HYDROCHLORIDE 0.1 ML: 10 INJECTION, SOLUTION EPIDURAL; INFILTRATION; INTRACAUDAL; PERINEURAL at 06:41

## 2022-06-07 RX ADMIN — MIDAZOLAM 2 MG: 1 INJECTION INTRAMUSCULAR; INTRAVENOUS at 07:29

## 2022-06-07 RX ADMIN — FENTANYL CITRATE 25 MCG: 50 INJECTION, SOLUTION INTRAMUSCULAR; INTRAVENOUS at 10:47

## 2022-06-07 RX ADMIN — ONDANSETRON 4 MG: 2 INJECTION INTRAMUSCULAR; INTRAVENOUS at 10:31

## 2022-06-07 RX ADMIN — PROPOFOL 200 MG: 10 INJECTION, EMULSION INTRAVENOUS at 07:35

## 2022-06-07 RX ADMIN — SUGAMMADEX 200 MG: 100 INJECTION, SOLUTION INTRAVENOUS at 09:04

## 2022-06-07 NOTE — INTERVAL H&P NOTE
"I have reviewed the surgical (or preoperative) H&P that is linked to this encounter, and examined the patient. There are no significant changes    Clinical Conditions Present on Arrival:  Clinically Significant Risk Factors Present on Admission                   # Overweight: Estimated body mass index is 27.96 kg/m  as calculated from the following:    Height as of this encounter: 1.651 m (5' 5\").    Weight as of this encounter: 76.2 kg (168 lb).       "

## 2022-06-07 NOTE — PROVIDER NOTIFICATION
06/07/22 1124   Discharge/Handoff   Transport Nurse Lexus WEBER RN   Handoff given to Yolanda SOLANO RN   Oxygen Therapy   SpO2 95 %   Device (Oxygen Therapy) none (room air)   Valuables   Patient Belongings remains with patient   Patient Belongings Remaining with Patient cell phone/electronics;clothing;shoes   Did you bring any home meds/supplements to the hospital?  No     Afebrile, vitals stable. Tolerating ice chips and water. Pain and nausea gone following zofran and fentanyl. Ready to transfer to [facility].

## 2022-06-07 NOTE — OP NOTE
[facility] Gynecologic Operative Note   Sharron Shipley  [id number]  June 7, 2022    Preoperative Diagnosis: History of high-grade cervical dysplasia, vulvar skin tag   Postoperative Diagnosis: same     Procedure:   Transvaginal hysterectomy with bilateral salpingo-oophorectomy, cystoscopy, excision of vulvar skin tag      Surgeon: Trini Schultz MD  Assists: Igor LOW; His assistance was required for retraction, instrument and suture handling    Anesthesia: GETA  Complications: none apparent      EBL: 200 mL   IVF: see anesthesia record   UOP: 100 mL clear urine     Findings: Exam under anesthesia revealed 2x right vulvar skin tags (~4fiv6qm and [id number]). Otherwise normal external gentalia, vagina and cervix. Bimanual exam with small, anteverted uterus with no appreciable adnexal enlargement. Hemostatic surgical site at the end of the case. Cystoscopy revealed no evidence of bladder injury or suture material. Bilateral ureteral urine jet stream noted.     Specimen: Uterus, cervix, bilateral fallopian tubes and ovaries, vulvar skin tag     Indications: Ms. Shipley is a 50 yo P4 female with a hx of high-grade cervical dysplasia/trauma with colposcopy and LEEP procedure who desires definitive surgical management with a transvaginal hysterectomy, bilateral salpingectomy. We reviewed the risks of bleeding, infection and recognized and unrecognized intraabdominal injury. I discussed the risk of needing laparoscopy or laparotomy. She understands the alternative of routine screening with the ASCCP guidelines as an alternative.   She is agreeable to a blood transfusion if indicated.   We also discussed the literature around ovarian conservation vs excision. She would prefer bilateral oophorectomy if it is possible to accomplish this vaginally. Informed consent was signed.     Procedure: The patient was taken to the operating room where general anesthesia was induced without difficulty. She was then examined  under anesthesia with the above noted findings. She was then prepared and draped in the normal sterile fashion in the dorsal lithotomy position using yellow fin stirrups. A hercules catheter was placed in the bladder.     Attention was then turned to the vagina where 1%lidocaine with vasopressin was injected along the cervicovaginal junction. This plane was incised with cautery. The anterior peritoneum was entered with metzenbaum scissors and the posterior peritoneum was entered with villarreal scissors. Retractors were placed. The right uterosacral ligament was grasped with a Douglas clamp, incised and suture ligated. This was repeated on the left uterosacral ligament. The right uterine artery was similarly clamp, incised and suture ligated. This was repeated on the left side. The remaining parametrial tissue with clamped, cut and suture ligated until the cervix and uterus could be removed vaginally. The ovaries and fallopian tubes were each grasped with a terence, douglas clamped at the pedicle base, excised and suture ligated. Hemostasis was assured. The vaginal cuff was closed with [id number] vicryl sutures with care to incorporate the anterior and posterior peritoneum, as well as the uterosacral ligaments. A cystoscopy was performed that showed bilateral ureteral jets, confirming ureteral patency.     The patient tolerated the procedure well. Sponge, lap and needle counts were correct. The patient was taken to the recovery area in stable condition.    Trini Schultz MD

## 2022-06-07 NOTE — DISCHARGE INSTRUCTIONS
Same Day Surgery Discharge Instructions  Special Precautions After Surgery - Adult    It is not unusual to feel lightheaded or faint, up to 24 hours after surgery or while taking pain medication.  If you have these symptoms; sit for a few minutes before standing and have someone assist you when getting up.  You should rest and relax for the next 24 hours and must have someone stay with you for at least 24 hours after your discharge.  DO NOT DRIVE any vehicle or operate mechanical equipment for 24 hours following the end of your surgery.  DO NOT DRIVE while taking narcotic pain medications that have been prescribed by your physician.  If you had a limb operated on, you must be able to use it fully to drive.  DO NOT drink alcoholic beverages for 24 hours following surgery or while taking prescription pain medication.  Drink clear liquids (apple juice, ginger ale, broth, 7-Up, etc.).  Progress to your regular diet as you feel able.  Any questions call your physician and do not make important decisions for 24 hrs.    Nausea and Vomiting: Nausea and vomiting can occur any time after receiving anesthesia. If you experience nausea and vomiting we encourage you to move to a clear liquid diet and advance your diet as tolerated. If nausea and vomiting do not improve within 12 hours please call the surgeon or present to the Emergency department.     Break-through Bleeding: If your experience bleeding from your surgical site apply pressure and additional dressing per nurse instruction. For simple problems such as a saturated dressing, you may need to reinforce the dressing with more gauze and tape and put slight pressure on the site. If bleeding does not subside contact the surgeon or present to the Emergency Department.    Post-op Infection: If you develop a fever of 100.4 or greater, have pus like drainage, redness, swelling or severe pain at the surgical site not alleviated with pain medications; please  contact the surgeon or present to the Emergency Department.    Nurse Advice Line: [phone]    __________________________________________________________________________________________________________________________________  IMPORTANT NUMBERS:    [facility] [place] [place] Main Number:  [phone], [phone]  Pharmacy:  [phone]  Same Day Surgery:  [phone], Monday - Friday until 8:30 p.m.  Urgent Care:  [phone]  Emergency Room:  [phone]      [facility]:  [phone]                                                                             [facility]:  [phone] option [address] [place] [address] Orthopedics:  [phone]     [facility] Clinic:  [phone]

## 2022-06-07 NOTE — ANESTHESIA POSTPROCEDURE EVALUATION
Patient: Sharron Shipley    Procedure: Procedure(s):  Transvaginal hysterectomy, bilateral salpingo-oophorectomy, cystoscopy ,Excision of vulvar Skin Tag       Anesthesia Type:  General    Note:  Disposition: Outpatient   Postop Pain Control: Uneventful            Sign Out: Well controlled pain   PONV: No   Neuro/Psych: Uneventful            Sign Out: Acceptable/Baseline neuro status   Airway/Respiratory: Uneventful            Sign Out: Acceptable/Baseline resp. status   CV/Hemodynamics: Uneventful            Sign Out: Acceptable CV status; No obvious hypovolemia; No obvious fluid overload   Other NRE: NONE   DID A NON-ROUTINE EVENT OCCUR? No           Last vitals:  Vitals Value Taken Time   /90 06/07/22 1000   Temp 36.5  C (97.7  F) 06/07/22 1000   Pulse 91 06/07/22 1003   Resp 10 06/07/22 1003   SpO2 99 % 06/07/22 1003   Vitals shown include unvalidated device data.    Electronically Signed By: Aziza Correa CRNA, APRN CRNA  June 7, 2022  10:04 AM

## 2022-06-07 NOTE — ANESTHESIA CARE TRANSFER NOTE
Patient: Sharron Shipley    Procedure: Procedure(s):  Transvaginal hysterectomy, bilateral salpingo-oophorectomy, cystoscopy ,Excision of vulvar Skin Tag       Diagnosis: High grade squamous intraepithelial cervical dysplasia [R87.613]  Diagnosis Additional Information: No value filed.    Anesthesia Type:   General     Note:    Oropharynx: oropharynx clear of all foreign objects and spontaneously breathing  Level of Consciousness: awake and drowsy  Oxygen Supplementation: face mask  Level of Supplemental Oxygen (L/min / FiO2): 8  Independent Airway: airway patency satisfactory and stable  Dentition: dentition unchanged  Vital Signs Stable: post-procedure vital signs reviewed and stable  Report to RN Given: handoff report given  Patient transferred to: PACU    Handoff Report: Identifed the Patient, Identified the Reponsible Provider, Reviewed the pertinent medical history, Discussed the surgical course, Reviewed Intra-OP anesthesia mangement and issues during anesthesia, Set expectations for post-procedure period and Allowed opportunity for questions and acknowledgement of understanding      Vitals:  Vitals Value Taken Time   BP     Temp     Pulse     Resp     SpO2         Electronically Signed By: Aziza Correa CRNA, APRN CRNA  June 7, 2022  9:25 AM

## 2022-06-08 LAB
PATH REPORT.COMMENTS IMP SPEC: NORMAL
PATH REPORT.FINAL DX SPEC: NORMAL
PATH REPORT.GROSS SPEC: NORMAL
PATH REPORT.MICROSCOPIC SPEC OTHER STN: NORMAL
PATH REPORT.RELEVANT HX SPEC: NORMAL
PHOTO IMAGE: NORMAL

## 2022-06-09 DIAGNOSIS — Z90.710 S/P VAGINAL HYSTERECTOMY: ICD-10-CM

## 2022-06-09 RX ORDER — OXYCODONE HYDROCHLORIDE 5 MG/1
5-10 TABLET ORAL EVERY 4 HOURS PRN
Qty: 20 TABLET | Refills: 0 | Status: SHIPPED | OUTPATIENT
Start: 2022-06-09 | End: 2023-01-30

## 2022-06-10 ENCOUNTER — MYC REFILL (OUTPATIENT)
Dept: OBGYN | Facility: CLINIC | Age: 52
End: 2022-06-10
Payer: COMMERCIAL

## 2022-06-10 DIAGNOSIS — Z90.710 S/P VAGINAL HYSTERECTOMY: ICD-10-CM

## 2022-06-10 RX ORDER — OXYCODONE HYDROCHLORIDE 5 MG/1
5-10 TABLET ORAL EVERY 4 HOURS PRN
Qty: 20 TABLET | Refills: 0 | Status: CANCELLED | OUTPATIENT
Start: 2022-06-10

## 2022-06-17 ENCOUNTER — PATIENT OUTREACH (OUTPATIENT)
Dept: OBGYN | Facility: CLINIC | Age: 52
End: 2022-06-17
Payer: COMMERCIAL

## 2022-06-17 NOTE — LETTER
2022      Sharron Shipley  [address] [address]        Dear Sharron,     To ensure we are providing the best quality care, we have reviewed your chart and see that you are due for:    Colon Cancer Screening:    If you have received a referral to schedule a Colonoscopy or choose to do a Colonoscopy instead of the FIT/ Cologuard test, please call [phone] to schedule.    If you have received a FIT test kit from a prior office visit, please check the expiration date. If , please get a new kit from the Lab/ Clinic. If not , please complete the test and mail in as soon as you are able.    If you would prefer to complete a Cologuard test, please reach out to your provider to see if this is an option for you.    You may call Dept: [phone] if you have any questions. If you have completed the tests outside of [facility], please have the results forwarded to our office Fax # [phone]. We will update the chart for your primary Physician to review before your next annual physical.        Sincerely,        Trini Schultz MD

## 2022-06-17 NOTE — TELEPHONE ENCOUNTER
Panel Management Review        Health Maintenance List    Health Maintenance   Topic Date Due     DEPRESSION ACTION PLAN  Never done     HIV SCREENING  Never done     HEPATITIS C SCREENING  Never done     ZOSTER IMMUNIZATION (1 of 2) Never done     COLORECTAL CANCER SCREENING  02/24/2022     COVID-19 Vaccine (2 - Booster for Marco series) 03/03/2023 (Originally 7/14/2021)     PREVENTIVE CARE VISIT  10/08/2022     PHQ-9  11/09/2022     MAMMO SCREENING  11/16/2022     ADVANCE CARE PLANNING  10/08/2026     LIPID  11/16/2026     HPV TEST  04/25/2027     PAP  04/25/2027     DTAP/TDAP/TD IMMUNIZATION (2 - Td or Tdap) 09/19/2028     INFLUENZA VACCINE  Addressed     Pneumococcal Vaccine: Pediatrics (0 to 5 Years) and At-Risk Patients (6 to 64 Years)  Aged Out     IPV IMMUNIZATION  Aged Out     MENINGITIS IMMUNIZATION  Aged Out     HEPATITIS B IMMUNIZATION  Aged Out       Composite cancer screening  Chart review shows that this patient is due/due soon for the following Colonoscopy  Lab Results   Component Value Date    PAP NIL 09/23/2020     Past Surgical History:   Procedure Laterality Date     ESOPHAGOSCOPY, GASTROSCOPY, DUODENOSCOPY (EGD), DILATATION, COMBINED N/A 05/10/2021    Procedure: ESOPHAGOGASTRODUODENOSCOPY, WITH DILATION of lower esophageal sphincter;  Surgeon: Valentin Taylor MD;  Location: [facility]     GI SURGERY       HYSTERECTOMY VAGINAL, BILATERAL SALPINGO-OOPHERECTOMY, COMBINED Bilateral 6/7/2022    Procedure: Transvaginal hysterectomy, bilateral salpingo-oophorectomy, cystoscopy ,Excision of vulvar Skin Tag;  Surgeon: Trini Schultz MD;  Location: [facility]       Is hysterectomy listed in surgical history? Yes   Is mastectomy listed in surgical history? No     Summary:    Patient is due/failing the following:   Colonoscopy    Action needed: Patient needs office visit for colonoscopy.    Type of outreach:  Sent [employer] message.      Staff Signature:  Veronica Chapman LPN

## 2022-06-29 ENCOUNTER — PATIENT OUTREACH (OUTPATIENT)
Dept: OBGYN | Facility: CLINIC | Age: 52
End: 2022-06-29

## 2022-06-29 NOTE — TELEPHONE ENCOUNTER
Hi Dr Schultz,    This patient recently had a hysterectomy with you on 6/7/22. Cervix with benign pathology. History of SHAWN II-III on colposcopy 3/3/22 with LEEP showing SHAWN I. Please advise if you would like repeat cotesting 6 months or 1 year out from hysterectomy. Thanks!    2006, 2007, 2008, 2012 NIL Paps (Care Everywhere)  2017 NIL Pap, Neg HPV (Care Everywhere)  9/23/20 NIL Pap, + HR HPV (neg 16/18). Cotest due in 1 year.   10/8/21 NIL Pap, + HR HPV (neg 16/18). Spencer due by 1/8/2022.    3/3/22 Colpo bx SHAWN I, focal SHAWN II-III. Plan: LEEP due before 6/3/22  4/25/22 LEEP SHAWN I with positive margins, ECC neg. Plan: hyst  6/7/22 Hyst - cervix benign. Plan: await provider    Laura Yee RN BSN, Pap Tracking

## 2022-06-30 NOTE — TELEPHONE ENCOUNTER
"Copied note from provider:    \"  Temo, Nelli Byers MD  You 15 hours ago (4:51 PM)     SM    Cotesting 1 year   Nelli Plascencia MD   [facility]   Please disregard the message about changing promettrium to Provera (wrong chart)   Nelli Plascencia MD   [facility]    \"  "

## 2022-07-14 ENCOUNTER — OFFICE VISIT (OUTPATIENT)
Dept: OBGYN | Facility: CLINIC | Age: 52
End: 2022-07-14
Payer: COMMERCIAL

## 2022-07-14 VITALS
SYSTOLIC BLOOD PRESSURE: 142 MMHG | WEIGHT: 162.7 LBS | TEMPERATURE: 97.8 F | HEIGHT: 65 IN | DIASTOLIC BLOOD PRESSURE: 77 MMHG | BODY MASS INDEX: 27.11 KG/M2 | RESPIRATION RATE: 16 BRPM | HEART RATE: 77 BPM

## 2022-07-14 DIAGNOSIS — Z90.710 S/P VAGINAL HYSTERECTOMY: ICD-10-CM

## 2022-07-14 DIAGNOSIS — N89.8 VAGINAL ITCHING: Primary | ICD-10-CM

## 2022-07-14 LAB
CLUE CELLS: ABNORMAL
TRICHOMONAS, WET PREP: ABNORMAL
WBC'S/HIGH POWER FIELD, WET PREP: ABNORMAL
YEAST, WET PREP: ABNORMAL

## 2022-07-14 PROCEDURE — 87210 SMEAR WET MOUNT SALINE/INK: CPT | Performed by: OBSTETRICS & GYNECOLOGY

## 2022-07-14 PROCEDURE — 99024 POSTOP FOLLOW-UP VISIT: CPT | Performed by: OBSTETRICS & GYNECOLOGY

## 2022-07-14 NOTE — PROGRESS NOTES
"FÁTIMA Shipley is a 51 year old female presents for post operative check. She is  5  week(s) status post Trans vaginal hysterectomy w/ BSO she also had excision of 2 skin tags that were benign.  She reports doing well and denies significant pain or bleeding.   She does report some fullness or poreeure in the suprapubic area.  No dysuria.  She has some vaginal itching and has a hx of yeast infections in the past.  There were no pathological findings.    O.  Blood pressure (!) 142/77, pulse 77, temperature 97.8  F (36.6  C), resp. rate 16, height 1.651 m (5' 5\"), weight 73.8 kg (162 lb 11.2 oz), not currently breastfeeding.    Abd: soft, non-tender, non-distended. I  Pelvic exam: normal vagina and vulva, post hysterectomy status, vaginal cuff incompletely  Healed,and minimally tender.   wet mount exam shows white blood cells, exam chaperoned by nurse.     A. /P.  (N89.8) Vaginal itching  (primary encounter diagnosis)  Comment: neg wet prep  Plan: Wet prep - Clinic Collect  symptomatic therapy    (Z90.710) S/P vaginal hysterectomy  Comment: incomplete cuff healing  All path was benign  Plan: avoid intercourse for another week         Follow up prn or when due for next annual exam.    Tristan Baird MD    "

## 2022-10-09 ENCOUNTER — HEALTH MAINTENANCE LETTER (OUTPATIENT)
Age: 52
End: 2022-10-09

## 2022-11-19 DIAGNOSIS — E78.5 HYPERLIPIDEMIA LDL GOAL <130: ICD-10-CM

## 2022-11-22 RX ORDER — ATORVASTATIN CALCIUM 20 MG/1
TABLET, FILM COATED ORAL
Qty: 90 TABLET | Refills: 0 | Status: SHIPPED | OUTPATIENT
Start: 2022-11-22 | End: 2023-01-31

## 2022-11-26 ENCOUNTER — HEALTH MAINTENANCE LETTER (OUTPATIENT)
Age: 52
End: 2022-11-26

## 2022-12-13 ENCOUNTER — PATIENT OUTREACH (OUTPATIENT)
Dept: OBGYN | Facility: CLINIC | Age: 52
End: 2022-12-13

## 2022-12-13 NOTE — LETTER
2022      Sharron Shipley  [address] [address]              Dear Sharron,    To ensure we are providing the best quality care, we have reviewed your chart and see that you are due for:    Colon Cancer Screening:    If you have received a referral to schedule a Colonoscopy or choose to do a Colonoscopy instead of the FIT/ Cologuard test, please call [phone] to schedule.    If you have received a FIT test kit from a prior office visit, please check the expiration date. If , please get a new kit from the Lab/ Clinic. If not , please complete the test and mail in as soon as you are able.    If you would prefer to complete a Cologuard test, please reach out to your provider to see if this is an option for you.    You may call Dept: [phone] if you have any questions. If you have completed the tests outside of [facility], please have the results forwarded to our office Fax # [phone]. We will update the chart for your primary Physician to review before your next annual physical.          Sincerely,      Trini Schultz MD

## 2022-12-13 NOTE — TELEPHONE ENCOUNTER
Panel Management Review        Health Maintenance List    Health Maintenance   Topic Date Due     DEPRESSION ACTION PLAN  Never done     HEPATITIS B IMMUNIZATION (1 of 3 - 3-dose series) Never done     HIV SCREENING  Never done     HEPATITIS C SCREENING  Never done     ZOSTER IMMUNIZATION (1 of 2) Never done     COLORECTAL CANCER SCREENING  02/24/2022     INFLUENZA VACCINE (1) 09/01/2022     YEARLY PREVENTIVE VISIT  10/08/2022     PHQ-9  11/09/2022     MAMMO SCREENING  11/16/2022     COVID-19 Vaccine (2 - Booster for Marco series) 03/03/2023 (Originally 7/14/2021)     PAP FOLLOW-UP  06/07/2023     HPV FOLLOW-UP  06/07/2023     ADVANCE CARE PLANNING  10/08/2026     LIPID  11/16/2026     DTAP/TDAP/TD IMMUNIZATION (2 - Td or Tdap) 09/19/2028     Pneumococcal Vaccine: Pediatrics (0 to 5 Years) and At-Risk Patients (6 to 64 Years)  Aged Out     IPV IMMUNIZATION  Aged Out     MENINGITIS IMMUNIZATION  Aged Out     PAP  Discontinued       Composite cancer screening  Chart review shows that this patient is due/due soon for the following Colonoscopy  Lab Results   Component Value Date    PAP NIL 09/23/2020     Past Surgical History:   Procedure Laterality Date     ESOPHAGOSCOPY, GASTROSCOPY, DUODENOSCOPY (EGD), DILATATION, COMBINED N/A 05/10/2021    Procedure: ESOPHAGOGASTRODUODENOSCOPY, WITH DILATION of lower esophageal sphincter;  Surgeon: Valentin Taylor MD;  Location: [facility]     GI SURGERY       HYSTERECTOMY VAGINAL, BILATERAL SALPINGO-OOPHERECTOMY, COMBINED Bilateral 6/7/2022    Procedure: Transvaginal hysterectomy, bilateral salpingo-oophorectomy, cystoscopy ,Excision of vulvar Skin Tag;  Surgeon: Trini Schultz MD;  Location: [facility]       Is hysterectomy listed in surgical history? Yes   Is mastectomy listed in surgical history? No     Summary:    Patient is due/failing the following:   Colonoscopy    Action needed: Patient needs office visit for colonoscopy.    Type of outreach:  Sent [employer]  message.      Staff Signature:  Danielle Duenas CMA

## 2023-01-23 ASSESSMENT — ENCOUNTER SYMPTOMS
HEADACHES: 1
SORE THROAT: 0
NAUSEA: 0
ARTHRALGIAS: 1
EYE PAIN: 0
CHILLS: 0
HEMATURIA: 0
PALPITATIONS: 0
BREAST MASS: 0
PARESTHESIAS: 0
FEVER: 0
NERVOUS/ANXIOUS: 1
HEARTBURN: 0
FREQUENCY: 0
HEMATOCHEZIA: 0
COUGH: 0
CONSTIPATION: 0
DIARRHEA: 0
SHORTNESS OF BREATH: 0
DYSURIA: 0
ABDOMINAL PAIN: 0
WEAKNESS: 0
JOINT SWELLING: 0
DIZZINESS: 0
MYALGIAS: 1

## 2023-01-27 NOTE — PATIENT INSTRUCTIONS

## 2023-01-30 ENCOUNTER — LAB (OUTPATIENT)
Dept: FAMILY MEDICINE | Facility: CLINIC | Age: 53
End: 2023-01-30

## 2023-01-30 ENCOUNTER — OFFICE VISIT (OUTPATIENT)
Dept: FAMILY MEDICINE | Facility: CLINIC | Age: 53
End: 2023-01-30
Payer: COMMERCIAL

## 2023-01-30 VITALS
WEIGHT: 167.04 LBS | TEMPERATURE: 97.2 F | BODY MASS INDEX: 26.85 KG/M2 | OXYGEN SATURATION: 100 % | HEIGHT: 66 IN | SYSTOLIC BLOOD PRESSURE: 124 MMHG | RESPIRATION RATE: 16 BRPM | DIASTOLIC BLOOD PRESSURE: 70 MMHG | HEART RATE: 90 BPM

## 2023-01-30 DIAGNOSIS — E78.5 HYPERLIPIDEMIA LDL GOAL <130: ICD-10-CM

## 2023-01-30 DIAGNOSIS — B00.1 RECURRENT COLD SORES: ICD-10-CM

## 2023-01-30 DIAGNOSIS — Z90.710 S/P VAGINAL HYSTERECTOMY: ICD-10-CM

## 2023-01-30 DIAGNOSIS — Z11.59 NEED FOR HEPATITIS C SCREENING TEST: ICD-10-CM

## 2023-01-30 DIAGNOSIS — Z00.00 ROUTINE GENERAL MEDICAL EXAMINATION AT A HEALTH CARE FACILITY: Primary | ICD-10-CM

## 2023-01-30 DIAGNOSIS — Z12.11 SCREEN FOR COLON CANCER: ICD-10-CM

## 2023-01-30 DIAGNOSIS — Z12.31 VISIT FOR SCREENING MAMMOGRAM: ICD-10-CM

## 2023-01-30 DIAGNOSIS — F32.0 MILD MAJOR DEPRESSION (H): ICD-10-CM

## 2023-01-30 DIAGNOSIS — Z90.710 H/O TOTAL HYSTERECTOMY: ICD-10-CM

## 2023-01-30 DIAGNOSIS — R12 HEARTBURN: ICD-10-CM

## 2023-01-30 DIAGNOSIS — Z13.1 SCREENING FOR DIABETES MELLITUS: ICD-10-CM

## 2023-01-30 DIAGNOSIS — F41.9 ANXIETY: ICD-10-CM

## 2023-01-30 DIAGNOSIS — Z11.4 SCREENING FOR HIV (HUMAN IMMUNODEFICIENCY VIRUS): ICD-10-CM

## 2023-01-30 LAB
CHOLEST SERPL-MCNC: 266 MG/DL
FASTING STATUS PATIENT QL REPORTED: YES
GLUCOSE SERPL-MCNC: 104 MG/DL (ref 70–99)
HDLC SERPL-MCNC: 50 MG/DL
LDLC SERPL CALC-MCNC: 190 MG/DL
NONHDLC SERPL-MCNC: 216 MG/DL
TRIGL SERPL-MCNC: 130 MG/DL

## 2023-01-30 PROCEDURE — 99214 OFFICE O/P EST MOD 30 MIN: CPT | Mod: 25 | Performed by: STUDENT IN AN ORGANIZED HEALTH CARE EDUCATION/TRAINING PROGRAM

## 2023-01-30 PROCEDURE — 80061 LIPID PANEL: CPT | Performed by: STUDENT IN AN ORGANIZED HEALTH CARE EDUCATION/TRAINING PROGRAM

## 2023-01-30 PROCEDURE — 36415 COLL VENOUS BLD VENIPUNCTURE: CPT | Performed by: STUDENT IN AN ORGANIZED HEALTH CARE EDUCATION/TRAINING PROGRAM

## 2023-01-30 PROCEDURE — 99396 PREV VISIT EST AGE 40-64: CPT | Performed by: STUDENT IN AN ORGANIZED HEALTH CARE EDUCATION/TRAINING PROGRAM

## 2023-01-30 PROCEDURE — 82947 ASSAY GLUCOSE BLOOD QUANT: CPT | Performed by: STUDENT IN AN ORGANIZED HEALTH CARE EDUCATION/TRAINING PROGRAM

## 2023-01-30 PROCEDURE — 86803 HEPATITIS C AB TEST: CPT | Performed by: STUDENT IN AN ORGANIZED HEALTH CARE EDUCATION/TRAINING PROGRAM

## 2023-01-30 PROCEDURE — 87389 HIV-1 AG W/HIV-1&-2 AB AG IA: CPT | Performed by: STUDENT IN AN ORGANIZED HEALTH CARE EDUCATION/TRAINING PROGRAM

## 2023-01-30 RX ORDER — VALACYCLOVIR HYDROCHLORIDE 1 G/1
2000 TABLET, FILM COATED ORAL 2 TIMES DAILY
Qty: 8 TABLET | Refills: 4 | Status: SHIPPED | OUTPATIENT
Start: 2023-01-30 | End: 2024-03-21

## 2023-01-30 RX ORDER — VALACYCLOVIR HYDROCHLORIDE 1 G/1
1000 TABLET, FILM COATED ORAL DAILY
Qty: 90 TABLET | Refills: 3 | Status: SHIPPED | OUTPATIENT
Start: 2023-01-30 | End: 2024-02-19

## 2023-01-30 RX ORDER — ESCITALOPRAM OXALATE 5 MG/1
TABLET ORAL
Qty: 50 TABLET | Refills: 0 | Status: SHIPPED | OUTPATIENT
Start: 2023-01-30 | End: 2023-02-22

## 2023-01-30 RX ORDER — FAMOTIDINE 20 MG/1
20 TABLET, FILM COATED ORAL 2 TIMES DAILY
Qty: 60 TABLET | Refills: 3 | Status: SHIPPED | OUTPATIENT
Start: 2023-01-30 | End: 2023-03-13

## 2023-01-30 ASSESSMENT — ENCOUNTER SYMPTOMS
WEAKNESS: 0
COUGH: 0
FEVER: 0
HEMATOCHEZIA: 0
CONSTIPATION: 0
HEADACHES: 1
JOINT SWELLING: 0
ABDOMINAL PAIN: 0
SORE THROAT: 0
CHILLS: 0
BREAST MASS: 0
NAUSEA: 0
DYSURIA: 0
HEARTBURN: 0
PALPITATIONS: 0
ARTHRALGIAS: 1
HEMATURIA: 0
MYALGIAS: 1
DIARRHEA: 0
FREQUENCY: 0
DIZZINESS: 0
NERVOUS/ANXIOUS: 1
EYE PAIN: 0
PARESTHESIAS: 0
SHORTNESS OF BREATH: 0

## 2023-01-30 ASSESSMENT — PATIENT HEALTH QUESTIONNAIRE - PHQ9
SUM OF ALL RESPONSES TO PHQ QUESTIONS 1-9: 9
SUM OF ALL RESPONSES TO PHQ QUESTIONS 1-9: 9
10. IF YOU CHECKED OFF ANY PROBLEMS, HOW DIFFICULT HAVE THESE PROBLEMS MADE IT FOR YOU TO DO YOUR WORK, TAKE CARE OF THINGS AT HOME, OR GET ALONG WITH OTHER PEOPLE: SOMEWHAT DIFFICULT

## 2023-01-30 ASSESSMENT — PAIN SCALES - GENERAL: PAINLEVEL: NO PAIN (0)

## 2023-01-30 NOTE — PROGRESS NOTES
SUBJECTIVE:   CC: Dagoberto is an 52 year old who presents for preventive health visit.   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, lots of walking    prozac  -wanting to swtich to lexapro  -hard to focus    Prozac:   -not helping with concentration    [employer]  Fam hx ovarian, breast, colon cancer-none    Pap/hpv-due 6/7/23   -SHAWN 1 on LEEP biopsy 4/25/22  -s/p total hysterectomy including cervix, ovaries    mammo-due  Colorectal screening-due (previously did FIT testing)    Mood  -prozac 10mg    HSV  -valtrex  -having inc in cold sores      Today's PHQ-2 Score:   PHQ-2 ( 1999 Pfizer) 1/23/2023   Q1: Little interest or pleasure in doing things 0   Q2: Feeling down, depressed or hopeless 0   PHQ-2 Score 0   PHQ-2 Total Score (12-17 Years)- Positive if 3 or more points; Administer PHQ-A if positive -   Q1: Little interest or pleasure in doing things Not at all   Q2: Feeling down, depressed or hopeless Not at all   PHQ-2 Score 0       Social History     Tobacco Use     Smoking status: Never     Smokeless tobacco: Never   Substance Use Topics     Alcohol use: Not Currently     Comment: rare     If you drink alcohol do you typically have >3 drinks per day or >7 drinks per week? No    Reviewed orders with patient.  Reviewed health maintenance and updated orders accordingly - Yes      Breast Cancer Screening:    Breast CA Risk Assessment (FHS-7) 10/8/2021   Do you have a family history of breast,  "colon, or ovarian cancer? No / Unknown       Pertinent mammograms are reviewed under the imaging tab.    History of abnormal Pap smear: Status post benign hysterectomy. Health Maintenance and Surgical History updated.  PAP / HPV Latest Ref Rng & Units 10/8/2021 9/23/2020   PAP   Negative for Intraepithelial Lesion or Malignancy (NILM) -   PAP (Historical) - - NIL   HPV16 Negative Negative Negative   HPV18 Negative Negative Negative   HRHPV Negative Positive(A) Positive(A)     Reviewed and updated as needed this visit by clinical staff   Tobacco  Allergies  Meds  Problems             Reviewed and updated as needed this visit by Provider     Meds  Problems              Review of Systems   Constitutional: Negative for chills and fever.   HENT: Negative for congestion, ear pain, hearing loss and sore throat.    Eyes: Negative for pain and visual disturbance.   Respiratory: Negative for cough and shortness of breath.    Cardiovascular: Negative for chest pain, palpitations and peripheral edema.   Gastrointestinal: Negative for abdominal pain, constipation, diarrhea, heartburn, hematochezia and nausea.   Breasts:  Negative for tenderness, breast mass and discharge.   Genitourinary: Negative for dysuria, frequency, genital sores, hematuria, pelvic pain, urgency, vaginal bleeding and vaginal discharge.   Musculoskeletal: Positive for arthralgias and myalgias. Negative for joint swelling.   Skin: Negative for rash.   Neurological: Positive for headaches. Negative for dizziness, weakness and paresthesias.   Psychiatric/Behavioral: Negative for mood changes. The patient is nervous/anxious.        OBJECTIVE:   /70 (BP Location: Left arm, Patient Position: Sitting, Cuff Size: Adult Large)   Pulse 90   Temp 97.2  F (36.2  C) (Temporal)   Resp 16   Ht 1.676 m (5' 6\")   Wt 75.8 kg (167 lb 0.6 oz)   LMP  (LMP Unknown)   SpO2 100%   BMI 26.96 kg/m       Physical Exam  Constitutional:       General: She is not in " acute distress.     Appearance: She is well-developed.   HENT:      Head: Normocephalic and atraumatic.      Right Ear: Tympanic membrane, ear canal and external ear normal.      Left Ear: Tympanic membrane, ear canal and external ear normal.      Nose: Nose normal.      Mouth/Throat:      Mouth: Mucous membranes are moist.      Pharynx: No oropharyngeal exudate.   Eyes:      Conjunctiva/sclera: Conjunctivae normal.      Pupils: Pupils are equal, round, and reactive to light.   Cardiovascular:      Rate and Rhythm: Normal rate and regular rhythm.      Heart sounds: Normal heart sounds. No murmur heard.  Pulmonary:      Effort: Pulmonary effort is normal.      Breath sounds: No wheezing or rales.   Abdominal:      General: Bowel sounds are normal.      Palpations: Abdomen is soft.      Tenderness: There is no abdominal tenderness.   Musculoskeletal:      Cervical back: Normal range of motion and neck supple. No rigidity.   Lymphadenopathy:      Cervical: No cervical adenopathy.   Skin:     General: Skin is warm and dry.      Findings: No rash.   Neurological:      General: No focal deficit present.      Mental Status: She is alert and oriented to person, place, and time.   Psychiatric:         Mood and Affect: Mood normal.         Behavior: Behavior normal.         ASSESSMENT/PLAN:   Sharron was seen today for physical.    Routine general medical examination at a health care facility  Practicing a healthy lifestyle. Counseled on diet/exercise. Vitals WNL. Declines zoster & flu vaccines today. Screening lipids, hep c, HIV ordered. Had hysterectomy, per chart review, ob/gyn requesting pap/hpv one last time 1 year from procedure (6/2023). Cologuard ordered. Mammo ordered.    Screening for HIV (human immunodeficiency virus)  -     HIV Antigen Antibody Combo; Future    Need for hepatitis C screening test  -     Hepatitis C Screen Reflex to HCV RNA Quant and Genotype; Future    Screen for colon cancer  -      "KENISHA([employer]); Future    Visit for screening mammogram  -     MA SCREENING DIGITAL BILAT - Future  (s+30); Future    Mild major depression (H)  Prozac not helping with concentration and anxiety. Will taper off and switch to lexapro. Will mychart with tapering plan.    Taper plan:  -start lexapro 5mg daily for 1 week, then increase to 10mg daily  -take prozac 10mg daily for 1 week then stop  -follow up in 1 month (virtual ok)    Recurrent cold sores  Increase suppressive dose to 1000mg daily. Continue taking 2000mg BID for exacerbations.  -     valACYclovir (VALTREX) 1000 mg tablet; Take 1 tablet (1,000 mg) by mouth daily  -     valACYclovir (VALTREX) 1000 mg tablet; Take 2 tablets (2,000 mg) by mouth 2 times daily    Hyperlipidemia LDL goal <130  Stopped lipitor x 3 months now. Will recheck lipids. If elevated, will restart lipitor.  -     Lipid panel; Future    Screening for diabetes mellitus  Son has diabetes. Requesting testing. Fasting today.  -     Glucose; Future    Heartburn  Uncontrolled. Will start pepcid 20mg BID. Follow up if no improvement.   -     famotidine (PEPCID) 20 MG tablet; Take 1 tablet (20 mg) by mouth 2 times daily    Other orders  -     REVIEW OF HEALTH MAINTENANCE PROTOCOL ORDERS        COUNSELING:  Reviewed preventive health counseling, as reflected in patient instructions       Regular exercise       Healthy diet/nutrition       Vision screening       Colorectal Cancer Screening       Consider Hep C screening for all patients one time for ages 18-79 years       HIV screeninx in teen years, 1x in adult years, and at intervals if high risk      BMI:   Estimated body mass index is 26.96 kg/m  as calculated from the following:    Height as of this encounter: 1.676 m (5' 6\").    Weight as of this encounter: 75.8 kg (167 lb 0.6 oz).   Weight management plan: Discussed healthy diet and exercise guidelines      She reports that she has never smoked. She has never used smokeless " tobacco.      Michael Tejada, [facility]    Answers for HPI/ROS submitted by the patient on 1/30/2023  If you checked off any problems, how difficult have these problems made it for you to do your work, take care of things at home, or get along with other people?: Somewhat difficult  PHQ9 TOTAL SCORE: 9

## 2023-01-30 NOTE — PROGRESS NOTES
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

## 2023-01-30 NOTE — PROGRESS NOTES
SUBJECTIVE:   CC: Dagoberto is an 52 year old who presents for preventive health visit.   {Split Bill scripting  The purpose of this visit is to discuss your medical history and prevent health problems before you are sick. You may be responsible for a co-pay, coinsurance, or deductible if your visit today includes services such as checking on a sore throat, having an x-ray or lab test, or treating and evaluating a new or existing condition :136187}  {Patient advised of split billing (Optional):033803}  Healthy Habits:     Getting at least 3 servings of Calcium per day:  Yes    Bi-annual eye exam:  Yes    Dental care twice a year:  NO    Sleep apnea or symptoms of sleep apnea:  None    Diet:  Regular (no restrictions)    Frequency of exercise:  None    Taking medications regularly:  Yes    Medication side effects:  None    PHQ-2 Total Score: 1    Additional concerns today:  Yes    {Add if <65 person on Medicare  - Required Questions (Optional):386949}  {Outside tests to abstract? :043892}    {additional problems to add (Optional):067087}    Today's PHQ-2 Score:   PHQ-2 ( 1999 Pfizer) 1/23/2023   Q1: Little interest or pleasure in doing things 0   Q2: Feeling down, depressed or hopeless 0   PHQ-2 Score 0   PHQ-2 Total Score (12-17 Years)- Positive if 3 or more points; Administer PHQ-A if positive -   Q1: Little interest or pleasure in doing things Not at all   Q2: Feeling down, depressed or hopeless Not at all   PHQ-2 Score 0           Social History     Tobacco Use     Smoking status: Never     Smokeless tobacco: Never   Substance Use Topics     Alcohol use: Not Currently     Comment: rare     {Rooming Staff- Complete this question if Prescreen response is not shown below for today's visit. If you drink alcohol do you typically have >3 drinks per day or >7 drinks per week? (Optional):391003}    Alcohol Use 1/23/2023   Prescreen: >3 drinks/day or >7 drinks/week? No   Prescreen: >3 drinks/day or >7 drinks/week? -  "  {add AUDIT responses (Optional) (A score of 7 for adult men is an indication of hazardous drinking; a score of 8 or more is an indication of an alcohol use disorder.  A score of 7 or more for adult women is an indication of hazardous drinking or an alchohol use disorder):524935}    Reviewed orders with patient.  Reviewed health maintenance and updated orders accordingly - { :086990::\"Yes\"}  {Chronicprobdata (optional):424523}    Breast Cancer Screening:    Breast CA Risk Assessment (FHS-7) 10/8/2021   Do you have a family history of breast, colon, or ovarian cancer? No / Unknown       {If any of the questions to the BCRA (FHS-7) are answered yes, consider ordering referral for genetic counseling (Optional) :834474::\"click delete button to remove this line now\"}  {AMB Mammogram Decision Support (Optional) :690074}  Pertinent mammograms are reviewed under the imaging tab.    History of abnormal Pap smear: { :889592}  PAP / HPV Latest Ref Rng & Units 10/8/2021 9/23/2020   PAP   Negative for Intraepithelial Lesion or Malignancy (NILM) -   PAP (Historical) - - NIL   HPV16 Negative Negative Negative   HPV18 Negative Negative Negative   HRHPV Negative Positive(A) Positive(A)     Reviewed and updated as needed this visit by clinical staff   Tobacco  Allergies  Meds              Reviewed and updated as needed this visit by Provider                 {HISTORY OPTIONS (Optional):095686}    Review of Systems   Constitutional: Negative for chills and fever.   HENT: Negative for congestion, ear pain, hearing loss and sore throat.    Eyes: Negative for pain and visual disturbance.   Respiratory: Negative for cough and shortness of breath.    Cardiovascular: Negative for chest pain, palpitations and peripheral edema.   Gastrointestinal: Negative for abdominal pain, constipation, diarrhea, heartburn, hematochezia and nausea.   Breasts:  Negative for tenderness, breast mass and discharge.   Genitourinary: Negative for dysuria, " "frequency, genital sores, hematuria, pelvic pain, urgency, vaginal bleeding and vaginal discharge.   Musculoskeletal: Positive for arthralgias and myalgias. Negative for joint swelling.   Skin: Negative for rash.   Neurological: Positive for headaches. Negative for dizziness, weakness and paresthesias.   Psychiatric/Behavioral: Negative for mood changes. The patient is nervous/anxious.      {FEMALE ROS (Optional):188065}     OBJECTIVE:   LMP  (LMP Unknown)   Physical Exam  {Exam Choices (Optional):683578}    {Diagnostic Test Results (Optional):962664::\"Diagnostic Test Results:\",\"Labs reviewed in Epic\"}    ASSESSMENT/PLAN:   {Diag Picklist:457523}    {Patient advised of split billing (Optional):882657}      COUNSELING:  {FEMALE COUNSELING MESSAGES:062622::\"Reviewed preventive health counseling, as reflected in patient instructions\"}      BMI:   Estimated body mass index is 27.07 kg/m  as calculated from the following:    Height as of 7/14/22: 1.651 m (5' 5\").    Weight as of 7/14/22: 73.8 kg (162 lb 11.2 oz).   {Weight Management Plan needed for ACO:395557}      She reports that she has never smoked. She has never used smokeless tobacco.      {Counseling Resources  [place] Preventive Services Task Force  Cholesterol Screening  Health diet/nutrition  Pooled Cohorts Equation Calculator  USDA's MyPlate  ASA Prophylaxis  Lung CA Screening  Osteoporosis prevention/bone health :686919}  {Breast Cancer Risk Calculator  BRCA-Related Cancer Risk Assessment FHS-7 Tool :038371}  Michael Tejada DO  [facility]  Answers for HPI/ROS submitted by the patient on 1/30/2023  If you checked off any problems, how difficult have these problems made it for you to do your work, take care of things at home, or get along with other people?: Somewhat difficult  PHQ9 TOTAL SCORE: 9      "

## 2023-01-31 ENCOUNTER — MYC MEDICAL ADVICE (OUTPATIENT)
Dept: FAMILY MEDICINE | Facility: CLINIC | Age: 53
End: 2023-01-31
Payer: COMMERCIAL

## 2023-01-31 DIAGNOSIS — Z13.1 SCREENING FOR DIABETES MELLITUS: Primary | ICD-10-CM

## 2023-01-31 DIAGNOSIS — E78.5 HYPERLIPIDEMIA LDL GOAL <130: ICD-10-CM

## 2023-01-31 LAB
HCV AB SERPL QL IA: NONREACTIVE
HIV 1+2 AB+HIV1 P24 AG SERPL QL IA: NONREACTIVE

## 2023-01-31 RX ORDER — ATORVASTATIN CALCIUM 20 MG/1
20 TABLET, FILM COATED ORAL DAILY
Qty: 90 TABLET | Refills: 3 | Status: SHIPPED | OUTPATIENT
Start: 2023-01-31 | End: 2023-09-06

## 2023-02-18 ENCOUNTER — HEALTH MAINTENANCE LETTER (OUTPATIENT)
Age: 53
End: 2023-02-18

## 2023-02-22 ENCOUNTER — VIRTUAL VISIT (OUTPATIENT)
Dept: FAMILY MEDICINE | Facility: CLINIC | Age: 53
End: 2023-02-22
Payer: COMMERCIAL

## 2023-02-22 DIAGNOSIS — G25.81 RESTLESS LEG SYNDROME: ICD-10-CM

## 2023-02-22 DIAGNOSIS — N89.8 VAGINAL DISCHARGE: ICD-10-CM

## 2023-02-22 DIAGNOSIS — F41.9 ANXIETY: ICD-10-CM

## 2023-02-22 DIAGNOSIS — F32.0 MILD MAJOR DEPRESSION (H): Primary | ICD-10-CM

## 2023-02-22 PROCEDURE — 99214 OFFICE O/P EST MOD 30 MIN: CPT | Mod: 95 | Performed by: NURSE PRACTITIONER

## 2023-02-22 RX ORDER — CYCLOBENZAPRINE HCL 10 MG
10 TABLET ORAL 3 TIMES DAILY PRN
Qty: 30 TABLET | Refills: 3 | Status: SHIPPED | OUTPATIENT
Start: 2023-02-22 | End: 2023-03-01

## 2023-02-22 RX ORDER — ESCITALOPRAM OXALATE 10 MG/1
10 TABLET ORAL DAILY
Qty: 90 TABLET | Refills: 1 | Status: SHIPPED | OUTPATIENT
Start: 2023-02-22 | End: 2023-08-17

## 2023-02-22 ASSESSMENT — ANXIETY QUESTIONNAIRES
6. BECOMING EASILY ANNOYED OR IRRITABLE: SEVERAL DAYS
3. WORRYING TOO MUCH ABOUT DIFFERENT THINGS: NOT AT ALL
GAD7 TOTAL SCORE: 6
2. NOT BEING ABLE TO STOP OR CONTROL WORRYING: NOT AT ALL
GAD7 TOTAL SCORE: 6
5. BEING SO RESTLESS THAT IT IS HARD TO SIT STILL: NEARLY EVERY DAY
1. FEELING NERVOUS, ANXIOUS, OR ON EDGE: MORE THAN HALF THE DAYS
7. FEELING AFRAID AS IF SOMETHING AWFUL MIGHT HAPPEN: NOT AT ALL

## 2023-02-22 ASSESSMENT — PATIENT HEALTH QUESTIONNAIRE - PHQ9
5. POOR APPETITE OR OVEREATING: NOT AT ALL
SUM OF ALL RESPONSES TO PHQ QUESTIONS 1-9: 10

## 2023-02-22 NOTE — PROGRESS NOTES
"Dagoberto is a 52 year old who is being evaluated via a billable telephone visit.      What phone number would you like to be contacted at? [phone]  How would you like to obtain your AVS? Sae  Distant Location (provider location):  On-site    Assessment & Plan     Restless leg syndrome  Stable with as needed Flexeril, refill sent to the pharmacy.  - cyclobenzaprine (FLEXERIL) 10 MG tablet; Take 1 tablet (10 mg) by mouth 3 times daily as needed for muscle spasms    Mild major depression (H)  Reports improvement with the transition from fluoxetine to Lexapro.  Plan to continue with current dose at this time.  - escitalopram (LEXAPRO) 10 MG tablet; Take 1 tablet (10 mg) by mouth daily    Anxiety  Improved.  Per above.  - escitalopram (LEXAPRO) 10 MG tablet; Take 1 tablet (10 mg) by mouth daily    Vaginal discharge  On wet prep to further evaluate  - Wet prep - Clinic Collect; Future      Return in about 1 week (around 3/1/2023), or if symptoms worsen or fail to improve.    PACHECO Britton [facility]    Subjective   Dagoberto is a 52 year old, presenting for the following health issues:  Mental Health Problem      History of Present Illness       Reason for visit:  New medication    She eats 2-3 servings of fruits and vegetables daily.She consumes 2 sweetened beverage(s) daily.She exercises with enough effort to increase her heart rate 60 or more minutes per day.  She exercises with enough effort to increase her heart rate 4 days per week.   She is taking medications regularly.       Depression and Anxiety Follow-Up    How are you doing with your depression since your last visit? Improved     How are you doing with your anxiety since your last visit?  Improved     Are you having other symptoms that might be associated with depression or anxiety? Yes:  . Feeling like \"on the go\" but feels this has improved some with switching to Lexapro.     Taking med in AM but feeling sleepy in " afternoon. Going to switch to HS    Have you had a significant life event? No     Do you have any concerns with your use of alcohol or other drugs? No    Social History     Tobacco Use     Smoking status: Never     Smokeless tobacco: Never   Vaping Use     Vaping Use: Never used   Substance Use Topics     Alcohol use: Not Currently     Comment: rare     Drug use: Never     PHQ 5/9/2022 1/30/2023 2/22/2023   PHQ-9 Total Score 2 9 10   Q9: Thoughts of better off dead/self-harm past 2 weeks Not at all Not at all Not at all     CRYSTAL-7 SCORE 1/27/2021 5/9/2022 2/22/2023   Total Score 3 0 6       Vaginal Symptoms      Duration: 4-5 mo    Description  vaginal discharge - yellow and itching    Intensity:  moderate    Accompanying signs and symptoms (fever/dysuria/abdominal or back pain): None    History  Possibility of pregnancy: No    Precipitating or alleviating factors: feels this started after Hysterectomy     Therapies tried and outcome: Vulva Cream   Outcome: helps with itching        Review of Systems   Constitutional, HEENT, cardiovascular, pulmonary, gi and gu systems are negative, except as otherwise noted.      Objective           Vitals:  No vitals were obtained today due to virtual visit.    Physical Exam   healthy, alert and no distress  PSYCH: Alert and oriented times 3; coherent speech, normal   rate and volume, able to articulate logical thoughts, able   to abstract reason, no tangential thoughts, no hallucinations   or delusions  Her affect is normal  RESP: No cough, no audible wheezing, able to talk in full sentences  Remainder of exam unable to be completed due to telephone visits      Phone call duration: 7 minutes

## 2023-02-27 DIAGNOSIS — R12 HEARTBURN: ICD-10-CM

## 2023-03-01 ENCOUNTER — HOSPITAL ENCOUNTER (EMERGENCY)
Facility: CLINIC | Age: 53
Discharge: HOME | End: 2023-03-01
Attending: FAMILY MEDICINE | Admitting: FAMILY MEDICINE
Payer: COMMERCIAL

## 2023-03-01 ENCOUNTER — APPOINTMENT (OUTPATIENT)
Dept: CT IMAGING | Facility: CLINIC | Age: 53
End: 2023-03-01
Attending: FAMILY MEDICINE
Payer: COMMERCIAL

## 2023-03-01 VITALS
HEART RATE: 95 BPM | RESPIRATION RATE: 18 BRPM | TEMPERATURE: 100.2 F | DIASTOLIC BLOOD PRESSURE: 105 MMHG | SYSTOLIC BLOOD PRESSURE: 157 MMHG

## 2023-03-01 DIAGNOSIS — N10 PYELONEPHRITIS, ACUTE: ICD-10-CM

## 2023-03-01 DIAGNOSIS — M54.41 ACUTE RIGHT-SIDED LOW BACK PAIN WITH RIGHT-SIDED SCIATICA: ICD-10-CM

## 2023-03-01 LAB
ALBUMIN SERPL BCG-MCNC: 4.3 G/DL (ref 3.5–5.2)
ALBUMIN UR-MCNC: 30 MG/DL
ALP SERPL-CCNC: 91 U/L (ref 35–104)
ALT SERPL W P-5'-P-CCNC: 20 U/L (ref 10–35)
ANION GAP SERPL CALCULATED.3IONS-SCNC: 10 MMOL/L (ref 7–15)
APPEARANCE UR: ABNORMAL
AST SERPL W P-5'-P-CCNC: 24 U/L (ref 10–35)
BACTERIA #/AREA URNS HPF: ABNORMAL /HPF
BASOPHILS # BLD AUTO: 0 10E3/UL (ref 0–0.2)
BASOPHILS NFR BLD AUTO: 0 %
BILIRUB SERPL-MCNC: 0.8 MG/DL
BILIRUB UR QL STRIP: NEGATIVE
BUN SERPL-MCNC: 7.5 MG/DL (ref 6–20)
CALCIUM SERPL-MCNC: 9.4 MG/DL (ref 8.6–10)
CHLORIDE SERPL-SCNC: 101 MMOL/L (ref 98–107)
COLOR UR AUTO: YELLOW
CREAT SERPL-MCNC: 0.82 MG/DL (ref 0.51–0.95)
DEPRECATED HCO3 PLAS-SCNC: 28 MMOL/L (ref 22–29)
EOSINOPHIL # BLD AUTO: 0 10E3/UL (ref 0–0.7)
EOSINOPHIL NFR BLD AUTO: 0 %
ERYTHROCYTE [DISTWIDTH] IN BLOOD BY AUTOMATED COUNT: 12.4 % (ref 10–15)
GFR SERPL CREATININE-BSD FRML MDRD: 85 ML/MIN/1.73M2
GLUCOSE SERPL-MCNC: 105 MG/DL (ref 70–99)
GLUCOSE UR STRIP-MCNC: NEGATIVE MG/DL
HCT VFR BLD AUTO: 39.3 % (ref 35–47)
HGB BLD-MCNC: 13.3 G/DL (ref 11.7–15.7)
HGB UR QL STRIP: NEGATIVE
HYALINE CASTS: 1 /LPF
IMM GRANULOCYTES # BLD: 0 10E3/UL
IMM GRANULOCYTES NFR BLD: 0 %
KETONES UR STRIP-MCNC: NEGATIVE MG/DL
LEUKOCYTE ESTERASE UR QL STRIP: ABNORMAL
LIPASE SERPL-CCNC: 23 U/L (ref 13–60)
LYMPHOCYTES # BLD AUTO: 1.3 10E3/UL (ref 0.8–5.3)
LYMPHOCYTES NFR BLD AUTO: 25 %
MCH RBC QN AUTO: 30.6 PG (ref 26.5–33)
MCHC RBC AUTO-ENTMCNC: 33.8 G/DL (ref 31.5–36.5)
MCV RBC AUTO: 91 FL (ref 78–100)
MONOCYTES # BLD AUTO: 0.3 10E3/UL (ref 0–1.3)
MONOCYTES NFR BLD AUTO: 6 %
MUCOUS THREADS #/AREA URNS LPF: PRESENT /LPF
NEUTROPHILS # BLD AUTO: 3.6 10E3/UL (ref 1.6–8.3)
NEUTROPHILS NFR BLD AUTO: 69 %
NITRATE UR QL: NEGATIVE
NRBC # BLD AUTO: 0 10E3/UL
NRBC BLD AUTO-RTO: 0 /100
PH UR STRIP: 8.5 [PH] (ref 5–7)
PLATELET # BLD AUTO: 220 10E3/UL (ref 150–450)
POTASSIUM SERPL-SCNC: 3.8 MMOL/L (ref 3.4–5.3)
PROT SERPL-MCNC: 7 G/DL (ref 6.4–8.3)
RBC # BLD AUTO: 4.34 10E6/UL (ref 3.8–5.2)
RBC URINE: <1 /HPF
SODIUM SERPL-SCNC: 139 MMOL/L (ref 136–145)
SP GR UR STRIP: 1.02 (ref 1–1.03)
SQUAMOUS EPITHELIAL: 2 /HPF
UROBILINOGEN UR STRIP-MCNC: 3 MG/DL
WBC # BLD AUTO: 5.2 10E3/UL (ref 4–11)
WBC URINE: 60 /HPF

## 2023-03-01 PROCEDURE — 87086 URINE CULTURE/COLONY COUNT: CPT | Performed by: FAMILY MEDICINE

## 2023-03-01 PROCEDURE — 80053 COMPREHEN METABOLIC PANEL: CPT | Performed by: FAMILY MEDICINE

## 2023-03-01 PROCEDURE — 96365 THER/PROPH/DIAG IV INF INIT: CPT | Performed by: FAMILY MEDICINE

## 2023-03-01 PROCEDURE — 83690 ASSAY OF LIPASE: CPT | Performed by: FAMILY MEDICINE

## 2023-03-01 PROCEDURE — 36415 COLL VENOUS BLD VENIPUNCTURE: CPT | Performed by: FAMILY MEDICINE

## 2023-03-01 PROCEDURE — 99285 EMERGENCY DEPT VISIT HI MDM: CPT | Performed by: FAMILY MEDICINE

## 2023-03-01 PROCEDURE — 250N000011 HC RX IP 250 OP 636: Performed by: FAMILY MEDICINE

## 2023-03-01 PROCEDURE — 258N000003 HC RX IP 258 OP 636: Performed by: FAMILY MEDICINE

## 2023-03-01 PROCEDURE — 250N000013 HC RX MED GY IP 250 OP 250 PS 637: Performed by: FAMILY MEDICINE

## 2023-03-01 PROCEDURE — 96375 TX/PRO/DX INJ NEW DRUG ADDON: CPT | Performed by: FAMILY MEDICINE

## 2023-03-01 PROCEDURE — 74176 CT ABD & PELVIS W/O CONTRAST: CPT

## 2023-03-01 PROCEDURE — 81001 URINALYSIS AUTO W/SCOPE: CPT | Performed by: FAMILY MEDICINE

## 2023-03-01 PROCEDURE — 96361 HYDRATE IV INFUSION ADD-ON: CPT | Performed by: FAMILY MEDICINE

## 2023-03-01 PROCEDURE — 99285 EMERGENCY DEPT VISIT HI MDM: CPT | Mod: 25 | Performed by: FAMILY MEDICINE

## 2023-03-01 PROCEDURE — 85025 COMPLETE CBC W/AUTO DIFF WBC: CPT | Performed by: FAMILY MEDICINE

## 2023-03-01 RX ORDER — SODIUM CHLORIDE 9 MG/ML
INJECTION, SOLUTION INTRAVENOUS CONTINUOUS
Status: DISCONTINUED | OUTPATIENT
Start: 2023-03-01 | End: 2023-03-01 | Stop reason: HOSPADM

## 2023-03-01 RX ORDER — OXYCODONE HYDROCHLORIDE 5 MG/1
5 TABLET ORAL EVERY 6 HOURS PRN
Qty: 12 TABLET | Refills: 0 | Status: SHIPPED | OUTPATIENT
Start: 2023-03-01 | End: 2023-03-04

## 2023-03-01 RX ORDER — HYDROMORPHONE HCL IN WATER/PF 6 MG/30 ML
0.2 PATIENT CONTROLLED ANALGESIA SYRINGE INTRAVENOUS ONCE
Status: COMPLETED | OUTPATIENT
Start: 2023-03-01 | End: 2023-03-01

## 2023-03-01 RX ORDER — CEFTRIAXONE 1 G/1
1 INJECTION, POWDER, FOR SOLUTION INTRAMUSCULAR; INTRAVENOUS ONCE
Status: COMPLETED | OUTPATIENT
Start: 2023-03-01 | End: 2023-03-01

## 2023-03-01 RX ORDER — CEFDINIR 300 MG/1
300 CAPSULE ORAL 2 TIMES DAILY
Qty: 14 CAPSULE | Refills: 0 | Status: SHIPPED | OUTPATIENT
Start: 2023-03-01 | End: 2023-03-08

## 2023-03-01 RX ORDER — CYCLOBENZAPRINE HCL 10 MG
5-10 TABLET ORAL 3 TIMES DAILY PRN
Qty: 20 TABLET | Refills: 0 | Status: SHIPPED | OUTPATIENT
Start: 2023-03-01 | End: 2023-09-06

## 2023-03-01 RX ORDER — OXYCODONE HYDROCHLORIDE 5 MG/1
5 TABLET ORAL ONCE
Status: COMPLETED | OUTPATIENT
Start: 2023-03-01 | End: 2023-03-01

## 2023-03-01 RX ORDER — KETOROLAC TROMETHAMINE 15 MG/ML
15 INJECTION, SOLUTION INTRAMUSCULAR; INTRAVENOUS ONCE
Status: COMPLETED | OUTPATIENT
Start: 2023-03-01 | End: 2023-03-01

## 2023-03-01 RX ADMIN — HYDROMORPHONE HYDROCHLORIDE 0.2 MG: 0.2 INJECTION, SOLUTION INTRAMUSCULAR; INTRAVENOUS; SUBCUTANEOUS at 11:54

## 2023-03-01 RX ADMIN — SODIUM CHLORIDE: 9 INJECTION, SOLUTION INTRAVENOUS at 13:53

## 2023-03-01 RX ADMIN — OXYCODONE HYDROCHLORIDE 5 MG: 5 TABLET ORAL at 13:51

## 2023-03-01 RX ADMIN — KETOROLAC TROMETHAMINE 15 MG: 15 INJECTION, SOLUTION INTRAMUSCULAR; INTRAVENOUS at 11:54

## 2023-03-01 RX ADMIN — SODIUM CHLORIDE 1000 ML: 9 INJECTION, SOLUTION INTRAVENOUS at 11:52

## 2023-03-01 RX ADMIN — CEFTRIAXONE SODIUM 1 G: 1 INJECTION, POWDER, FOR SOLUTION INTRAMUSCULAR; INTRAVENOUS at 13:25

## 2023-03-01 ASSESSMENT — ACTIVITIES OF DAILY LIVING (ADL)
ADLS_ACUITY_SCORE: 35
ADLS_ACUITY_SCORE: 35

## 2023-03-01 NOTE — ED TRIAGE NOTES
RT side back pain. Comes and goes, throbbing radiates down buttock.     Triage Assessment     Row Name 03/01/23 1113       Triage Assessment (Adult)    Airway WDL WDL       Respiratory WDL    Respiratory WDL WDL       Skin Circulation/Temperature WDL    Skin Circulation/Temperature WDL WDL       Cardiac WDL    Cardiac WDL WDL       Peripheral/Neurovascular WDL    Peripheral Neurovascular WDL WDL       Cognitive/Neuro/Behavioral WDL    Cognitive/Neuro/Behavioral WDL WDL

## 2023-03-01 NOTE — DISCHARGE INSTRUCTIONS
Thank you for choosing [facility].     Please closely monitor for further symptoms. Return to the Emergency Department if you develop any new or worsening signs or symptoms.    If you received any opiate pain medications or sedatives during your visit, please do not drive for at least 8 hours.     Labs, cultures or final xray interpretations may still need to be reviewed.  We will call you if your plan of care needs to be changed.    Please follow up with your primary care physician or clinic.

## 2023-03-01 NOTE — ED PROVIDER NOTES
"    [facility] EMERGENCY DEPARTMENT ([facility])     March 1, 2023     History     Chief Complaint   Patient presents with     Flank Pain     HPI  Sharron Shipley is a 52 year old female with a past medical history significant for kidney stones, s/p hysterectomy (6/7/2022), high cholesterol who presents to the Emergency Department for evaluation of right-sided lower back and flank pain. Patient reports she woke up yesterday with right-sided flank and back pain radiating into her right buttock. The pain was tolerable, so she went in to work, and when she came home she took 1 muscle relaxer. However, when she woke up today the pain was unbearable. She states that the pain is worse with movement, noting \"I could barely get dressed or  the phone\". The pain is throbbing, waxes and wanes in severity, became worse again immediately prior to arrival to the ED. States when she got in the car today she had to do everything with her left side due to pain. The pain does not radiate down her leg.    In addition, the patient endorses urinary urgency recently as well as intermittent loose stools yesterday. Has not passed any stools today. She has felt some nausea today. She has not taken any pain medications yesterday or today. Does note that she was started on Lexapro 2 weeks ago. No chronic medical problems other than s/p hysterectomy.    Otherwise, patient denies abdominal pain, fever, chills, burning pain with urination, or blood in urine. No known allergies.    Past Medical History  Past Medical History:   Diagnosis Date     Cervical high risk HPV (human papillomavirus) test positive 2020 2021     High cholesterol      PONV (postoperative nausea and vomiting)      Past Surgical History:   Procedure Laterality Date     ESOPHAGOSCOPY, GASTROSCOPY, DUODENOSCOPY (EGD), DILATATION, COMBINED N/A 05/10/2021    Procedure: ESOPHAGOGASTRODUODENOSCOPY, WITH DILATION of lower esophageal sphincter;  Surgeon: Valentin Taylor " MD SANDY;  Location: [facility]     GI SURGERY       HYSTERECTOMY VAGINAL, BILATERAL SALPINGO-OOPHERECTOMY, COMBINED Bilateral 6/7/2022    Procedure: Transvaginal hysterectomy, bilateral salpingo-oophorectomy, cystoscopy ,Excision of vulvar Skin Tag;  Surgeon: Trini Schultz MD;  Location: [facility] OR     atorvastatin (LIPITOR) 20 MG tablet  cefdinir (OMNICEF) 300 MG capsule  cyclobenzaprine (FLEXERIL) 10 MG tablet  escitalopram (LEXAPRO) 10 MG tablet  famotidine (PEPCID) 20 MG tablet  oxyCODONE (ROXICODONE) 5 MG tablet  valACYclovir (VALTREX) 1000 mg tablet  VITAMIN D PO  Multiple Vitamin (MULTIVITAMIN ADULT PO)  valACYclovir (VALTREX) 1000 mg tablet  vitamin B complex with vitamin C (STRESS TAB) tablet      No Known Allergies  Family History  Family History   Problem Relation Age of Onset     Diabetes Mother      Hyperlipidemia Mother      Sleep Apnea Father      Diabetes Son      Attention Deficit Disorder Son      Attention Deficit Disorder Daughter      Diabetes Son      Social History   Social History     Tobacco Use     Smoking status: Never     Smokeless tobacco: Never   Vaping Use     Vaping Use: Never used   Substance Use Topics     Alcohol use: Not Currently     Comment: rare     Drug use: Never      Past medical history, past surgical history, medications, allergies, family history, and social history were reviewed with the patient. No additional pertinent items.      A medically appropriate review of systems was performed with pertinent positives and negatives noted in the HPI, and all other systems negative.    Physical Exam   BP: (!) 157/105  Pulse: 95  Temp: 100.2  F (37.9  C)  Resp: 18  Physical Exam  Vitals and nursing note reviewed.   Constitutional:       General: She is in acute distress (uncomfortable appearing).      Appearance: She is well-developed.   HENT:      Head: Normocephalic and atraumatic.   Eyes:      Pupils: Pupils are equal, round, and reactive to light.   Neck:      Thyroid: No  thyromegaly.      Trachea: No tracheal deviation.   Cardiovascular:      Rate and Rhythm: Normal rate and regular rhythm.      Heart sounds: Normal heart sounds. No murmur heard.    No friction rub. No gallop.   Pulmonary:      Effort: Pulmonary effort is normal.      Breath sounds: Normal breath sounds.   Chest:      Chest wall: No tenderness.   Abdominal:      General: Bowel sounds are normal. There is no distension.      Palpations: Abdomen is soft. There is no mass.      Tenderness: There is no abdominal tenderness. There is right CVA tenderness. There is no guarding or rebound.   Musculoskeletal:      Cervical back: Normal range of motion and neck supple.      Lumbar back: Tenderness (right lumbar paraspinals) present. No swelling or spasms. Negative right straight leg raise test.   Skin:     General: Skin is warm and dry.      Findings: No rash.   Neurological:      General: No focal deficit present.      Mental Status: She is alert and oriented to person, place, and time.      Cranial Nerves: No cranial nerve deficit.      Sensory: No sensory deficit.      Motor: Abnormal muscle tone present. No weakness.      Coordination: Coordination normal.      Deep Tendon Reflexes: Reflexes are normal and symmetric.   Psychiatric:         Behavior: Behavior normal.           ED Course, Procedures, & Data     11:12 AM  The patient was seen and examined by Vega Ramirez MD in Room ED04.    Procedures                     Results for orders placed or performed during the hospital encounter of 03/01/23   CT Abdomen Pelvis w/o Contrast     Status: None    Narrative    CT ABDOMEN AND PELVIS WITHOUT CONTRAST 3/1/2023 12:37 PM    CLINICAL HISTORY: Severe right flank pain, evaluate for stone or other  etiology.  TECHNIQUE: CT scan of the abdomen and pelvis was performed without IV  contrast. Multiplanar reformats were obtained. Dose reduction  techniques were used.  CONTRAST: None.    COMPARISON: None.    FINDINGS:   LOWER  CHEST: Normal.    HEPATOBILIARY: Normal.    PANCREAS: Normal.    SPLEEN: Normal.    ADRENAL GLANDS: Normal.    KIDNEYS/BLADDER: No hydronephrosis. 1 and 3 mm nonobstructing left  renal calculi. No visible right renal or ureteral calculi.    BOWEL: Normal appendix. No obstruction or inflammatory changes.    LYMPH NODES: Normal.    VASCULATURE: Unremarkable.    PELVIC ORGANS: Hysterectomy.    OTHER: None.    MUSCULOSKELETAL: Normal.      Impression    IMPRESSION:   1.  No specific finding to explain the patient's pain.  2.  Punctate nonobstructing left renal calculi. No hydronephrosis or  ureteral calculus on either side.    BALTAZAR SLADE MD         SYSTEM ID:  [id number]   Comprehensive metabolic panel     Status: Abnormal   Result Value Ref Range    Sodium 139 136 - 145 mmol/L    Potassium 3.8 3.4 - 5.3 mmol/L    Chloride 101 98 - 107 mmol/L    Carbon Dioxide (CO2) 28 22 - 29 mmol/L    Anion Gap 10 7 - 15 mmol/L    Urea Nitrogen 7.5 6.0 - 20.0 mg/dL    Creatinine 0.82 0.51 - 0.95 mg/dL    Calcium 9.4 8.6 - 10.0 mg/dL    Glucose 105 (H) 70 - 99 mg/dL    Alkaline Phosphatase 91 35 - 104 U/L    AST 24 10 - 35 U/L    ALT 20 10 - 35 U/L    Protein Total 7.0 6.4 - 8.3 g/dL    Albumin 4.3 3.5 - 5.2 g/dL    Bilirubin Total 0.8 <=1.2 mg/dL    GFR Estimate 85 >60 mL/min/1.73m2   UA with Microscopic reflex to Culture     Status: Abnormal    Specimen: Urine, Midstream   Result Value Ref Range    Color Urine Yellow Colorless, Straw, Light Yellow, Yellow    Appearance Urine Slightly Cloudy (A) Clear    Glucose Urine Negative Negative mg/dL    Bilirubin Urine Negative Negative    Ketones Urine Negative Negative mg/dL    Specific Gravity Urine 1.018 1.003 - 1.035    Blood Urine Negative Negative    pH Urine 8.5 (H) 5.0 - 7.0    Protein Albumin Urine 30 (A) Negative mg/dL    Urobilinogen Urine 3.0 (A) Normal, 2.0 mg/dL    Nitrite Urine Negative Negative    Leukocyte Esterase Urine Moderate (A) Negative    Bacteria Urine Few (A)  None Seen /HPF    Mucus Urine Present (A) None Seen /LPF    RBC Urine <1 <=2 /HPF    WBC Urine 60 (H) <=5 /HPF    Squamous Epithelials Urine 2 (H) <=1 /HPF    Hyaline Casts Urine 1 <=2 /LPF    Narrative    Urine Culture ordered based on laboratory criteria   Lipase     Status: Normal   Result Value Ref Range    Lipase 23 13 - 60 U/L   CBC with platelets and differential     Status: None   Result Value Ref Range    WBC Count 5.2 4.0 - 11.0 10e3/uL    RBC Count 4.34 3.80 - 5.20 10e6/uL    Hemoglobin 13.3 11.7 - 15.7 g/dL    Hematocrit 39.3 35.0 - 47.0 %    MCV 91 78 - 100 fL    MCH 30.6 26.5 - 33.0 pg    MCHC 33.8 31.5 - 36.5 g/dL    RDW 12.4 10.0 - 15.0 %    Platelet Count 220 150 - 450 10e3/uL    % Neutrophils 69 %    % Lymphocytes 25 %    % Monocytes 6 %    % Eosinophils 0 %    % Basophils 0 %    % Immature Granulocytes 0 %    NRBCs per 100 WBC 0 <1 /100    Absolute Neutrophils 3.6 1.6 - 8.3 10e3/uL    Absolute Lymphocytes 1.3 0.8 - 5.3 10e3/uL    Absolute Monocytes 0.3 0.0 - 1.3 10e3/uL    Absolute Eosinophils 0.0 0.0 - 0.7 10e3/uL    Absolute Basophils 0.0 0.0 - 0.2 10e3/uL    Absolute Immature Granulocytes 0.0 <=0.4 10e3/uL    Absolute NRBCs 0.0 10e3/uL   CBC with platelets differential     Status: None    Narrative    The following orders were created for panel order CBC with platelets differential.  Procedure                               Abnormality         Status                     ---------                               -----------         ------                     CBC with platelets and d...[[id number]]                      Final result                 Please view results for these tests on the individual orders.     Medications   0.9% sodium chloride BOLUS (0 mLs Intravenous Stopped 3/1/23 1300)   ketorolac (TORADOL) injection 15 mg (15 mg Intravenous $Given 3/1/23 1154)   HYDROmorphone (DILAUDID) injection 0.2 mg (0.2 mg Intravenous $Given 3/1/23 1154)   cefTRIAXone (ROCEPHIN) 1 g vial to attach to NS  100 mL bag for ADULTS or NS 50 mL bag for PEDS (0 g Intravenous Stopped 3/1/23 1400)   oxyCODONE (ROXICODONE) tablet 5 mg (5 mg Oral $Given 3/1/23 1351)     Labs Ordered and Resulted from Time of ED Arrival to Time of ED Departure   COMPREHENSIVE METABOLIC PANEL - Abnormal       Result Value    Sodium 139      Potassium 3.8      Chloride 101      Carbon Dioxide (CO2) 28      Anion Gap 10      Urea Nitrogen 7.5      Creatinine 0.82      Calcium 9.4      Glucose 105 (*)     Alkaline Phosphatase 91      AST 24      ALT 20      Protein Total 7.0      Albumin 4.3      Bilirubin Total 0.8      GFR Estimate 85     ROUTINE UA WITH MICROSCOPIC REFLEX TO CULTURE - Abnormal    Color Urine Yellow      Appearance Urine Slightly Cloudy (*)     Glucose Urine Negative      Bilirubin Urine Negative      Ketones Urine Negative      Specific Gravity Urine 1.018      Blood Urine Negative      pH Urine 8.5 (*)     Protein Albumin Urine 30 (*)     Urobilinogen Urine 3.0 (*)     Nitrite Urine Negative      Leukocyte Esterase Urine Moderate (*)     Bacteria Urine Few (*)     Mucus Urine Present (*)     RBC Urine <1      WBC Urine 60 (*)     Squamous Epithelials Urine 2 (*)     Hyaline Casts Urine 1     LIPASE - Normal    Lipase 23     CBC WITH PLATELETS AND DIFFERENTIAL    WBC Count 5.2      RBC Count 4.34      Hemoglobin 13.3      Hematocrit 39.3      MCV 91      MCH 30.6      MCHC 33.8      RDW 12.4      Platelet Count 220      % Neutrophils 69      % Lymphocytes 25      % Monocytes 6      % Eosinophils 0      % Basophils 0      % Immature Granulocytes 0      NRBCs per 100 WBC 0      Absolute Neutrophils 3.6      Absolute Lymphocytes 1.3      Absolute Monocytes 0.3      Absolute Eosinophils 0.0      Absolute Basophils 0.0      Absolute Immature Granulocytes 0.0      Absolute NRBCs 0.0     URINE CULTURE     CT Abdomen Pelvis w/o Contrast   Final Result   IMPRESSION:    1.  No specific finding to explain the patient's pain.   2.  Punctate  nonobstructing left renal calculi. No hydronephrosis or   ureteral calculus on either side.      BALTAZAR SLADE MD            SYSTEM ID:  [id number]             Critical care was not performed.     Medical Decision Making  The patient's presentation was of moderate complexity (an acute illness with systemic symptoms).    The patient's evaluation involved:  ordering and/or review of 3+ test(s) in this encounter (see separate area of note for details)  strong consideration of a test (Lumbar spine CT or MRI) that was ultimately deferred    The patient's management necessitated moderate risk (prescription drug management including medications given in the ED).      Assessment & Plan    Patient with history of hysterectomy, otherwise healthy presenting with acute right flank and low back pain.  Differential diagnosis did consider less common and life threatening causes of acute lower back pain such as AAA, retroperitoneal bleed, pancreatitis, pyelonephritis, vertebral osteomyelitis, discitis, epidural hematoma or abscess, malignancy, other causes of severe spinal stenosis and cauda equina syndrome, as well as more common and likely causes of acute lower back pain such as myofascial low back pain, acute lumbar disc herniation or chronic degenerative lumbar disc disease, degenerative spine disease, spondylolysis or spondylolisthesis.  On exam, very uncomfortable appearing, temp 100.2, BP slightly elevated.  Very tender right CVA and right lumbar paraspinals.  No bony midline tenderness or palpable spasm.  Straight leg raise negative and neurovascular exam normal and intact bilaterally, benign abdominal exam.  The remainder of a complete physical exam is normal.  Labs concerning for UTI/ Pyelo in urine, normal creat.  CT obtained, no obstructing stone or other significant pathology.  Improved with analgesics, given IV Ceftriaxone.  Feeling much better.  Likely Pyelonephritis without signs of sepsis or other complication,  may also have some component of musculoskeletal pain with sciatica but no associated neuro deficit.  Based on the clinical findings and the entire clinical scenario, the patient appears stable at this time to be treated symptomatically, and to follow-up as an outpatient for any further evaluation and treatment.  Discussed expected course, need for follow up, and indications for return with the patient.  See discharge instructions.      I have reviewed the nursing notes. I have reviewed the findings, diagnosis, plan and need for follow up with the patient.    Discharge Medication List as of 3/1/2023  3:21 PM      START taking these medications    Details   cefdinir (OMNICEF) 300 MG capsule Take 1 capsule (300 mg) by mouth 2 times daily for 7 days, Disp-14 capsule, R-0, Local Print      oxyCODONE (ROXICODONE) 5 MG tablet Take 1 tablet (5 mg) by mouth every 6 hours as needed for pain, Disp-12 tablet, R-0, Local Print             Final diagnoses:   Pyelonephritis, acute   Acute right-sided low back pain with right-sided sciatica     I, Shruthi Ramirez, am serving as a trained medical scribe to document services personally performed by Vega Ramirez MD, based on the provider's statements to me.   IVega MD, was physically present and have reviewed and verified the accuracy of this note documented by Shruthi Ramirez.    Vega Ramirez MD  [facility] EMERGENCY DEPARTMENT  3/1/2023     Vega Ramirez MD  03/02/23 0835

## 2023-03-02 ENCOUNTER — PATIENT OUTREACH (OUTPATIENT)
Dept: FAMILY MEDICINE | Facility: CLINIC | Age: 53
End: 2023-03-02
Payer: COMMERCIAL

## 2023-03-02 LAB — BACTERIA UR CULT: NORMAL

## 2023-03-02 RX ORDER — FAMOTIDINE 20 MG/1
TABLET, FILM COATED ORAL
Qty: 60 TABLET | Refills: 3 | OUTPATIENT
Start: 2023-03-02

## 2023-03-02 NOTE — TELEPHONE ENCOUNTER
Reports persistent rt side low back pain 8/10 at worst when oxycodone wears off, trying to stay ahead of pain with oxycodone tab one every 6 hrs. No fevers, chills. Slight nausea, no vomiting. Has had toast this AM, pushing fluids. Adequate urine output with some burning.  Taking abx as prescribed.   Will be scheduling F/U appt with PCP Dr. Tejada.  ARMANDO Ross RN

## 2023-03-02 NOTE — TELEPHONE ENCOUNTER
Message sent to pharmacy - Refusal reason: Should already have refills on file.  Bertin FAULKNER         Disp Refills Start End KUNAL   famotidine (PEPCID) 20 MG tablet 60 tablet 3 1/30/2023  --   Sig - Route: Take 1 tablet (20 mg) by mouth 2 times daily

## 2023-03-13 ENCOUNTER — APPOINTMENT (OUTPATIENT)
Dept: LAB | Facility: CLINIC | Age: 53
End: 2023-03-13
Payer: COMMERCIAL

## 2023-03-13 ENCOUNTER — OFFICE VISIT (OUTPATIENT)
Dept: FAMILY MEDICINE | Facility: CLINIC | Age: 53
End: 2023-03-13
Payer: COMMERCIAL

## 2023-03-13 VITALS
OXYGEN SATURATION: 99 % | HEIGHT: 65 IN | SYSTOLIC BLOOD PRESSURE: 148 MMHG | RESPIRATION RATE: 19 BRPM | TEMPERATURE: 97.3 F | BODY MASS INDEX: 27.49 KG/M2 | DIASTOLIC BLOOD PRESSURE: 86 MMHG | WEIGHT: 165 LBS | HEART RATE: 84 BPM

## 2023-03-13 DIAGNOSIS — R10.9 RIGHT FLANK PAIN: Primary | ICD-10-CM

## 2023-03-13 DIAGNOSIS — K22.2 LOWER ESOPHAGEAL RING (SCHATZKI): ICD-10-CM

## 2023-03-13 DIAGNOSIS — R12 HEARTBURN: ICD-10-CM

## 2023-03-13 LAB
ALBUMIN UR-MCNC: NEGATIVE MG/DL
APPEARANCE UR: CLEAR
BACTERIA #/AREA URNS HPF: NORMAL /HPF
BILIRUB UR QL STRIP: NEGATIVE
COLOR UR AUTO: YELLOW
GLUCOSE UR STRIP-MCNC: NEGATIVE MG/DL
HGB UR QL STRIP: NEGATIVE
KETONES UR STRIP-MCNC: NEGATIVE MG/DL
LEUKOCYTE ESTERASE UR QL STRIP: NEGATIVE
NITRATE UR QL: NEGATIVE
PH UR STRIP: 7 [PH] (ref 5–7)
RBC #/AREA URNS AUTO: NORMAL /HPF
SP GR UR STRIP: 1.01 (ref 1–1.03)
SQUAMOUS #/AREA URNS AUTO: NORMAL /LPF
UROBILINOGEN UR STRIP-ACNC: 0.2 E.U./DL
WBC #/AREA URNS AUTO: NORMAL /HPF

## 2023-03-13 PROCEDURE — 81001 URINALYSIS AUTO W/SCOPE: CPT | Performed by: FAMILY MEDICINE

## 2023-03-13 PROCEDURE — 99214 OFFICE O/P EST MOD 30 MIN: CPT | Performed by: FAMILY MEDICINE

## 2023-03-13 PROCEDURE — 87086 URINE CULTURE/COLONY COUNT: CPT | Performed by: FAMILY MEDICINE

## 2023-03-13 RX ORDER — FAMOTIDINE 20 MG/1
20 TABLET, FILM COATED ORAL 2 TIMES DAILY
Qty: 60 TABLET | Refills: 3 | Status: CANCELLED | OUTPATIENT
Start: 2023-03-13

## 2023-03-13 ASSESSMENT — PAIN SCALES - GENERAL: PAINLEVEL: MILD PAIN (3)

## 2023-03-13 NOTE — PROGRESS NOTES
Assessment & Plan     Right flank pain  She continues to have some flank pain although it is significantly improved compared to what brought her to the emergency room.  The urine culture in the emergency room was not informative although the urine analysis was suggestive of infection.  CT imaging at the time had not shown any significant hydronephrosis or stones on the right side.  I suspect her current symptoms may be related to left over inflammation and she could use some ibuprofen or naproxen for symptom control.  I would like to repeat the urine analysis and urine culture today and antibiotic therapy could be offered if indicated.  - UA with Microscopic reflex to Culture - lab collect; Future  - Urine Culture Aerobic Bacterial - lab collect; Future  - UA with Microscopic reflex to Culture - lab collect  - Urine Culture Aerobic Bacterial - lab collect  - UA Microscopic with Reflex to Culture    Heartburn  She has some symptoms of heartburn at this time and I suggested trying omeprazole instead of the famotidine.  Longer-term she also has had an EGD with dilation for Schatzki's ring and she feels that those symptoms have recurred.  A PPI might offer lower risk of long-term recurrence.  - omeprazole (PRILOSEC) 20 MG DR capsule; Take 1 capsule (20 mg) by mouth daily    Lower esophageal ring (Schatzki)  We will refer back to GI for an EGD with possible dilation as she has had recurrent symptoms suggestive of a Schatzki ring, last dilated in May 2021.  - Adult GI  Referral - Procedure Only; Future           MED REC REQUIRED  Post Medication Reconciliation Status: discharge medications reconciled and changed, per note/orders      No follow-ups on file.    Harshal Canchola MD  [facility] ERMELINDA Arenas is a 52 year old presenting for the following health issues:  ER F/U      HPI     ED/UC Followup:    Facility:  [facility] Emergency Department  Date of visit:  "03/01/2023  Reason for visit: Flank Pain  Current Status: Still having pain in kidney     Patient developed right-sided flank pain and was seen in the emergency room on March 1.  She had imaging, blood work, and urine testing done and was diagnosed with acute pyelonephritis.  She received ceftriaxone in the emergency room and then was discharged on cefdinir.  Her symptoms are improved at this time but she continues to have some flank pain, albeit significantly less than it was in the emergency room.  Since then she has had occasional dysuria occasional chills, some nausea but no fever and no hematuria.  CT scan done in the emergency room did not show any evidence of hydronephrosis or obstruction on the right side.    She complains of some heartburn today and has symptoms suggestive of recurrent Schatzki ring with larger pieces of food getting stuck in the lower sternal area and occasionally needing to regurgitate them.  Review of her chart shows that she had an EGD with dilation of the Schatzki ring in May 2021 and symptoms are similar at this time.    Review of Systems   Constitutional, HEENT, cardiovascular, pulmonary, gi and gu systems are negative, except as otherwise noted.      Objective    BP (!) 148/86 (BP Location: Right arm, Patient Position: Sitting, Cuff Size: Adult Regular)   Pulse 84   Temp 97.3  F (36.3  C) (Temporal)   Resp 19   Ht 1.653 m (5' 5.08\")   Wt 74.8 kg (165 lb)   LMP  (LMP Unknown)   SpO2 99%   BMI 27.39 kg/m    Body mass index is 27.39 kg/m .  Physical Exam   GENERAL: healthy, alert and no distress  EYES: Eyes grossly normal to inspection, PERRL and conjunctivae and sclerae normal  NECK: no adenopathy, no asymmetry, masses, or scars and thyroid normal to palpation  RESP: lungs clear to auscultation - no rales, rhonchi or wheezes  CV: regular rate and rhythm, normal S1 S2, no S3 or S4, no murmur, click or rub, no peripheral edema and peripheral pulses strong  ABDOMEN: soft, " nontender, no hepatosplenomegaly, no masses and bowel sounds normal  SKIN: no suspicious lesions or rashes  BACK: no left CVA tenderness, minimal right CVA tenderness, no paralumbar tenderness  PSYCH: mentation appears normal, affect normal/bright    Admission on 03/01/2023, Discharged on 03/01/2023   Component Date Value Ref Range Status     Sodium 03/01/2023 139  136 - 145 mmol/L Final     Potassium 03/01/2023 3.8  3.4 - 5.3 mmol/L Final     Chloride 03/01/2023 101  98 - 107 mmol/L Final     Carbon Dioxide (CO2) 03/01/2023 28  22 - 29 mmol/L Final     Anion Gap 03/01/2023 10  7 - 15 mmol/L Final     Urea Nitrogen 03/01/2023 7.5  6.0 - 20.0 mg/dL Final     Creatinine 03/01/2023 0.82  0.51 - 0.95 mg/dL Final     Calcium 03/01/2023 9.4  8.6 - 10.0 mg/dL Final     Glucose 03/01/2023 105 (H)  70 - 99 mg/dL Final     Alkaline Phosphatase 03/01/2023 91  35 - 104 U/L Final     AST 03/01/2023 24  10 - 35 U/L Final     ALT 03/01/2023 20  10 - 35 U/L Final     Protein Total 03/01/2023 7.0  6.4 - 8.3 g/dL Final     Albumin 03/01/2023 4.3  3.5 - 5.2 g/dL Final     Bilirubin Total 03/01/2023 0.8  <=1.2 mg/dL Final     GFR Estimate 03/01/2023 85  >60 mL/min/1.73m2 Final    eGFR calculated using 2021 CKD-EPI equation.     Color Urine 03/01/2023 Yellow  Colorless, Straw, Light Yellow, Yellow Final     Appearance Urine 03/01/2023 Slightly Cloudy (A)  Clear Final     Glucose Urine 03/01/2023 Negative  Negative mg/dL Final     Bilirubin Urine 03/01/2023 Negative  Negative Final     Ketones Urine 03/01/2023 Negative  Negative mg/dL Final     Specific Gravity Urine 03/01/2023 1.018  1.003 - 1.035 Final     Blood Urine 03/01/2023 Negative  Negative Final     pH Urine 03/01/2023 8.5 (H)  5.0 - 7.0 Final     Protein Albumin Urine 03/01/2023 30 (A)  Negative mg/dL Final     Urobilinogen Urine 03/01/2023 3.0 (A)  Normal, 2.0 mg/dL Final     Nitrite Urine 03/01/2023 Negative  Negative Final     Leukocyte Esterase Urine 03/01/2023 Moderate  (A)  Negative Final     Bacteria Urine 03/01/2023 Few (A)  None Seen /HPF Final     Mucus Urine 03/01/2023 Present (A)  None Seen /LPF Final     RBC Urine 03/01/2023 <1  <=2 /HPF Final     WBC Urine 03/01/2023 60 (H)  <=5 /HPF Final     Squamous Epithelials Urine 03/01/2023 2 (H)  <=1 /HPF Final     Hyaline Casts Urine 03/01/2023 1  <=2 /LPF Final     Lipase 03/01/2023 23  13 - 60 U/L Final    On 02/07/2023 the assay method at [facility] laboratory was changed to the Roche Lipase method on the Jose c6000. Results obtained with different assay methods or kits cannot be used interchangeably, and therefore, direct comparison to results obtained from this laboratory prior to 02/07/2023 should be interpreted with caution, with each result interpreted in the context of its own reference interval.     WBC Count 03/01/2023 5.2  4.0 - 11.0 10e3/uL Final     RBC Count 03/01/2023 4.34  3.80 - 5.20 10e6/uL Final     Hemoglobin 03/01/2023 13.3  11.7 - 15.7 g/dL Final     Hematocrit 03/01/2023 39.3  35.0 - 47.0 % Final     MCV 03/01/2023 91  78 - 100 fL Final     MCH 03/01/2023 30.6  26.5 - 33.0 pg Final     MCHC 03/01/2023 33.8  31.5 - 36.5 g/dL Final     RDW 03/01/2023 12.4  10.0 - 15.0 % Final     Platelet Count 03/01/2023 220  150 - 450 10e3/uL Final     % Neutrophils 03/01/2023 69  % Final     % Lymphocytes 03/01/2023 25  % Final     % Monocytes 03/01/2023 6  % Final     % Eosinophils 03/01/2023 0  % Final     % Basophils 03/01/2023 0  % Final     % Immature Granulocytes 03/01/2023 0  % Final     NRBCs per 100 WBC 03/01/2023 0  <1 /100 Final     Absolute Neutrophils 03/01/2023 3.6  1.6 - 8.3 10e3/uL Final     Absolute Lymphocytes 03/01/2023 1.3  0.8 - 5.3 10e3/uL Final     Absolute Monocytes 03/01/2023 0.3  0.0 - 1.3 10e3/uL Final     Absolute Eosinophils 03/01/2023 0.0  0.0 - 0.7 10e3/uL Final     Absolute Basophils 03/01/2023 0.0  0.0 - 0.2 10e3/uL Final     Absolute Immature Granulocytes 03/01/2023  0.0  <=0.4 10e3/uL Final     Absolute NRBCs 03/01/2023 0.0  10e3/uL Final     Culture 03/01/2023 <10,000 CFU/mL Mixture of urogenital paulie   Final

## 2023-03-15 LAB — BACTERIA UR CULT: NO GROWTH

## 2023-03-20 DIAGNOSIS — B00.1 RECURRENT COLD SORES: Primary | ICD-10-CM

## 2023-03-20 RX ORDER — VALACYCLOVIR HYDROCHLORIDE 500 MG/1
TABLET, FILM COATED ORAL
Qty: 30 TABLET | Refills: 6 | Status: SHIPPED | OUTPATIENT
Start: 2023-03-20 | End: 2023-08-17

## 2023-03-27 ENCOUNTER — TELEPHONE (OUTPATIENT)
Dept: GASTROENTEROLOGY | Facility: CLINIC | Age: 53
End: 2023-03-27
Payer: COMMERCIAL

## 2023-03-27 NOTE — TELEPHONE ENCOUNTER
Screening Questions  BLUE  KIND OF PREP RED  LOCATION [review exclusion criteria] GREEN  SEDATION TYPE        Y Are you active on mychart?       Harshal Canchola MD    Ordering/Referring Provider?        [employer] What type of coverage do you have?      N Have you had a positive covid test in the last 14 days?     26.6 1. BMI  [BMI 40+ - review exclusion criteria]    Y  2. Are you able to give consent for your medical care? [IF NO,RN REVIEW]          N  3. Are you taking any prescription pain medications on a routine schedule   (ex narcotics: oxycodone, roxicodone, oxycontin,  and percocet)? [RN Review]          3a. EXTENDED PREP What kind of prescription?     N 4. Do you have any chemical dependencies such as alcohol, street drugs, or methadone?        **If yes 3- 5 , please schedule with MAC sedation.**          IF YES TO ANY 6 - 10 - HOSPITAL SETTING ONLY.     N 6.   Do you need assistance transferring?     N 7.   Have you had a heart or lung transplant?    N 8.   Are you currently on dialysis?   N 9.   Do you use daily home oxygen?   N 10. Do you take nitroglycerin?   10a.  If yes, how often?     11. [FEMALES]  N Are you currently pregnant?    11a.  If yes, how many weeks? [ Greater than 12 weeks, OR NEEDED]    N 12. Do you have Pulmonary Hypertension? *NEED PAC APPT AT [facility] w/ MAC*     N 13. [review exclusion criteria]  Do you have any implantable devices in your body (pacemaker, defib, LVAD)?    N 14. In the past 6 months, have you had any heart related issues including cardiomyopathy or heart attack?     14a.  If yes, did it require cardiac stenting if so when?     N 15. Have you had a stroke or Transient ischemic attack (TIA - aka  mini stroke ) within 6 months?      N 16. Do you have mod to severe Obstructive Sleep Apnea?  [Hospital only]    N 17. Do you have SEVERE AND UNCONTROLLED asthma? *NEED PAC APPT AT [facility] w/MAC*     18. Are you currently taking any blood thinners?     18a. No. Continue to  "19.   18b. Yes/no Blood Thinner: No [CONTINUE TO #19]    N 19. Do you take the medication Phentermine?    19a. If yes, \"Hold for 7 days before procedure.  Please consult your prescribing provider if you have questions about holding this medication.\"     N  20. Do you have chronic kidney disease?      N  21. Do you have a diagnosis of diabetes?     N  22. On a regular basis do you go 3-5 days between bowel movements?      23. Preferred LOCAL Pharmacy for Pre Prescription    [ LIST ONLY ONE PHARMACY]        [facility] [id number] [place] [facility] - [place], [place] - [address]      - CLOSING REMINDERS -    Informed patient they will need an adult    Cannot take any type of public or medical transportation alone    Conscious Sedation- Needs  for 6 hours after the procedure       MAC/General-Needs  for 24 hours after procedure    Pre-Procedure Covid test to be completed [[facility] PCR Testing Required]    Confirmed Nurse will call to complete assessment       - SCHEDULING DETAILS -  NO Hospital Setting Required? If yes, what is the exclusion?:    VISKOLESLIE  Surgeon    4/25  Date of Procedure  Upper Endoscopy [EGD]  Type of Procedure Scheduled  [facility] Location   Which Colonoscopy Prep was Sent?     MAC, PT REQUEST Sedation Type     N PAC / Pre-op Required         MAC is patient's preference.        "

## 2023-03-30 ENCOUNTER — MYC MEDICAL ADVICE (OUTPATIENT)
Dept: FAMILY MEDICINE | Facility: CLINIC | Age: 53
End: 2023-03-30
Payer: COMMERCIAL

## 2023-04-03 NOTE — TELEPHONE ENCOUNTER
Patient has EGD scheduled the end of April.  States:  I m having surgery for my esophagus at the end of April but I m having extreme pain. I am taking acid reflex meds once a day. When I eat I am regurgitate it back up. Liquids as well. Is there something I can take before my surgery  Please advise  Merly Hoover RN  [facility]

## 2023-04-04 NOTE — TELEPHONE ENCOUNTER
"Please advise patient:    -increase omeprazole to 20mg twice daily  -try \"mylanta\" liquid-this is over the counter antacid  -avoid NSAIDs (ibuprofen, aleve) as this could make heartburn symptoms worse      Dr. Tejada    "

## 2023-04-04 NOTE — TELEPHONE ENCOUNTER
Writer responded via [employer].    RHONA LawsonN, RN-BC  [facility]

## 2023-04-11 ENCOUNTER — TELEPHONE (OUTPATIENT)
Dept: GASTROENTEROLOGY | Facility: CLINIC | Age: 53
End: 2023-04-11

## 2023-04-11 NOTE — TELEPHONE ENCOUNTER
Patient scheduled for Upper endoscopy (EGD) on 4.25.23.     Discuss Covid policy.     Pre op exam needed? N/A    Arrival time: 1345. Procedure time 1445    Facility location: [facility]; [address] [place] [address], [address], [place] [id number]    Sedation type: MAC    Anticoagulations? No    Electronic implanted devices? No    Diabetic? No    Indication for procedure: Lower esophageal ring (Schatzki)      Pre visit planning completed.    Qi Sams RN  Endoscopy Procedure Pre Assessment RN

## 2023-04-12 NOTE — TELEPHONE ENCOUNTER
Attempted to contact patient regarding upcoming Upper endoscopy (EGD) procedure on 4.25.23 for pre assessment questions. No answer.     Left message to return call to [phone] #[phone]      Qi Sams RN  Endoscopy Procedure Pre Assessment RN

## 2023-04-18 NOTE — TELEPHONE ENCOUNTER
Pre assessment questions completed for upcoming Upper endoscopy (EGD) procedure scheduled on 4.25.23    COVID policy reviewed.     Reviewed procedural arrival time 1345 and facility location [facility]; [address] [place] [address], [address] Floor, [place] [address]    Designated  policy reviewed. Instructed to have someone stay 24 hours post procedure.     Anticoagulation/blood thinners? No    Electronic implanted devices? No    Diabetic? No    Reviewed EGD procedure prep instructions.     Patient verbalized understanding and had no questions or concerns at this time.    Qi Sams RN  Endoscopy Procedure Pre Assessment RN

## 2023-04-25 ENCOUNTER — ANESTHESIA (OUTPATIENT)
Dept: SURGERY | Facility: AMBULATORY SURGERY CENTER | Age: 53
End: 2023-04-25
Payer: COMMERCIAL

## 2023-04-25 ENCOUNTER — HOSPITAL ENCOUNTER (OUTPATIENT)
Facility: AMBULATORY SURGERY CENTER | Age: 53
Discharge: HOME | End: 2023-04-25
Attending: INTERNAL MEDICINE
Payer: COMMERCIAL

## 2023-04-25 ENCOUNTER — ANESTHESIA EVENT (OUTPATIENT)
Dept: SURGERY | Facility: AMBULATORY SURGERY CENTER | Age: 53
End: 2023-04-25
Payer: COMMERCIAL

## 2023-04-25 VITALS — HEART RATE: 101 BPM

## 2023-04-25 VITALS
OXYGEN SATURATION: 100 % | HEART RATE: 69 BPM | RESPIRATION RATE: 16 BRPM | TEMPERATURE: 97.8 F | WEIGHT: 165 LBS | SYSTOLIC BLOOD PRESSURE: 142 MMHG | DIASTOLIC BLOOD PRESSURE: 88 MMHG | HEIGHT: 65 IN | BODY MASS INDEX: 27.49 KG/M2

## 2023-04-25 LAB — UPPER GI ENDOSCOPY: NORMAL

## 2023-04-25 PROCEDURE — 43239 EGD BIOPSY SINGLE/MULTIPLE: CPT | Mod: 59

## 2023-04-25 PROCEDURE — 88305 TISSUE EXAM BY PATHOLOGIST: CPT | Mod: 26 | Performed by: STUDENT IN AN ORGANIZED HEALTH CARE EDUCATION/TRAINING PROGRAM

## 2023-04-25 PROCEDURE — 88305 TISSUE EXAM BY PATHOLOGIST: CPT | Mod: TC | Performed by: INTERNAL MEDICINE

## 2023-04-25 PROCEDURE — 43248 EGD GUIDE WIRE INSERTION: CPT

## 2023-04-25 RX ORDER — LIDOCAINE HYDROCHLORIDE 20 MG/ML
INJECTION, SOLUTION INFILTRATION; PERINEURAL PRN
Status: DISCONTINUED | OUTPATIENT
Start: 2023-04-25 | End: 2023-04-25

## 2023-04-25 RX ORDER — SODIUM CHLORIDE, SODIUM LACTATE, POTASSIUM CHLORIDE, CALCIUM CHLORIDE 600; 310; 30; 20 MG/100ML; MG/100ML; MG/100ML; MG/100ML
INJECTION, SOLUTION INTRAVENOUS CONTINUOUS PRN
Status: DISCONTINUED | OUTPATIENT
Start: 2023-04-25 | End: 2023-04-25

## 2023-04-25 RX ORDER — FLUMAZENIL 0.1 MG/ML
0.2 INJECTION, SOLUTION INTRAVENOUS
Status: DISCONTINUED | OUTPATIENT
Start: 2023-04-25 | End: 2023-04-26 | Stop reason: HOSPADM

## 2023-04-25 RX ORDER — ONDANSETRON 4 MG/1
4 TABLET, ORALLY DISINTEGRATING ORAL EVERY 6 HOURS PRN
Status: DISCONTINUED | OUTPATIENT
Start: 2023-04-25 | End: 2023-04-26 | Stop reason: HOSPADM

## 2023-04-25 RX ORDER — LIDOCAINE 40 MG/G
CREAM TOPICAL
Status: DISCONTINUED | OUTPATIENT
Start: 2023-04-25 | End: 2023-04-25 | Stop reason: HOSPADM

## 2023-04-25 RX ORDER — NALOXONE HYDROCHLORIDE 0.4 MG/ML
0.2 INJECTION, SOLUTION INTRAMUSCULAR; INTRAVENOUS; SUBCUTANEOUS
Status: DISCONTINUED | OUTPATIENT
Start: 2023-04-25 | End: 2023-04-26 | Stop reason: HOSPADM

## 2023-04-25 RX ORDER — ONDANSETRON 2 MG/ML
4 INJECTION INTRAMUSCULAR; INTRAVENOUS
Status: DISCONTINUED | OUTPATIENT
Start: 2023-04-25 | End: 2023-04-25 | Stop reason: HOSPADM

## 2023-04-25 RX ORDER — NALOXONE HYDROCHLORIDE 0.4 MG/ML
0.4 INJECTION, SOLUTION INTRAMUSCULAR; INTRAVENOUS; SUBCUTANEOUS
Status: DISCONTINUED | OUTPATIENT
Start: 2023-04-25 | End: 2023-04-26 | Stop reason: HOSPADM

## 2023-04-25 RX ORDER — KETAMINE HYDROCHLORIDE 10 MG/ML
INJECTION INTRAMUSCULAR; INTRAVENOUS PRN
Status: DISCONTINUED | OUTPATIENT
Start: 2023-04-25 | End: 2023-04-25

## 2023-04-25 RX ORDER — PROPOFOL 10 MG/ML
INJECTION, EMULSION INTRAVENOUS PRN
Status: DISCONTINUED | OUTPATIENT
Start: 2023-04-25 | End: 2023-04-25

## 2023-04-25 RX ORDER — PROCHLORPERAZINE MALEATE 10 MG
10 TABLET ORAL EVERY 6 HOURS PRN
Status: DISCONTINUED | OUTPATIENT
Start: 2023-04-25 | End: 2023-04-26 | Stop reason: HOSPADM

## 2023-04-25 RX ORDER — PROPOFOL 10 MG/ML
INJECTION, EMULSION INTRAVENOUS CONTINUOUS PRN
Status: DISCONTINUED | OUTPATIENT
Start: 2023-04-25 | End: 2023-04-25

## 2023-04-25 RX ORDER — ONDANSETRON 2 MG/ML
4 INJECTION INTRAMUSCULAR; INTRAVENOUS EVERY 6 HOURS PRN
Status: DISCONTINUED | OUTPATIENT
Start: 2023-04-25 | End: 2023-04-26 | Stop reason: HOSPADM

## 2023-04-25 RX ADMIN — KETAMINE HYDROCHLORIDE 20 MG: 10 INJECTION INTRAMUSCULAR; INTRAVENOUS at 14:33

## 2023-04-25 RX ADMIN — ONDANSETRON 4 MG: 4 TABLET, ORALLY DISINTEGRATING ORAL at 15:47

## 2023-04-25 RX ADMIN — LIDOCAINE HYDROCHLORIDE 80 MG: 20 INJECTION, SOLUTION INFILTRATION; PERINEURAL at 14:32

## 2023-04-25 RX ADMIN — PROPOFOL 50 MG: 10 INJECTION, EMULSION INTRAVENOUS at 14:33

## 2023-04-25 RX ADMIN — SODIUM CHLORIDE, SODIUM LACTATE, POTASSIUM CHLORIDE, CALCIUM CHLORIDE: 600; 310; 30; 20 INJECTION, SOLUTION INTRAVENOUS at 14:20

## 2023-04-25 RX ADMIN — PROPOFOL 200 MCG/KG/MIN: 10 INJECTION, EMULSION INTRAVENOUS at 14:33

## 2023-04-25 NOTE — ANESTHESIA PREPROCEDURE EVALUATION
Anesthesia Pre-Procedure Evaluation    Patient: Sharron Shipley   MRN: [id number] : 1970        Procedure : Procedure(s):  Esophagoscopy, gastroscopy, duodenoscopy (EGD), combined          Past Medical History:   Diagnosis Date     Cervical high risk HPV (human papillomavirus) test positive 2020     High cholesterol      PONV (postoperative nausea and vomiting)       Past Surgical History:   Procedure Laterality Date     ESOPHAGOSCOPY, GASTROSCOPY, DUODENOSCOPY (EGD), DILATATION, COMBINED N/A 05/10/2021    Procedure: ESOPHAGOGASTRODUODENOSCOPY, WITH DILATION of lower esophageal sphincter;  Surgeon: Valentin Taylor MD;  Location: [facility]     HYSTERECTOMY VAGINAL, BILATERAL SALPINGO-OOPHERECTOMY, COMBINED Bilateral 2022    Procedure: Transvaginal hysterectomy, bilateral salpingo-oophorectomy, cystoscopy ,Excision of vulvar Skin Tag;  Surgeon: Trini Schultz MD;  Location: [facility] OR      No Known Allergies   Social History     Tobacco Use     Smoking status: Never     Smokeless tobacco: Never   Vaping Use     Vaping status: Never Used   Substance Use Topics     Alcohol use: Not Currently     Comment: rare      Wt Readings from Last 1 Encounters:   23 74.8 kg (165 lb)        Anesthesia Evaluation   Pt has had prior anesthetic.     History of anesthetic complications  - PONV.      ROS/MED HX  ENT/Pulmonary:       Neurologic:       Cardiovascular:       METS/Exercise Tolerance:     Hematologic:       Musculoskeletal:       GI/Hepatic:     (+) GERD,     Renal/Genitourinary:     (+) Nephrolithiasis ,     Endo:       Psychiatric/Substance Use:     (+) psychiatric history anxiety     Infectious Disease:       Malignancy:       Other:            Physical Exam    Airway  airway exam normal      Mallampati: II   TM distance: > 3 FB   Neck ROM: full   Mouth opening: > 3 cm    Respiratory Devices and Support         Dental       (+) Completely normal teeth      Cardiovascular          Rhythm and  rate: regular and normal     Pulmonary   pulmonary exam normal        breath sounds clear to auscultation           OUTSIDE LABS:  CBC:   Lab Results   Component Value Date    WBC 5.2 03/01/2023    WBC 4.3 06/07/2022    HGB 13.3 03/01/2023    HGB 12.9 06/07/2022    HCT 39.3 03/01/2023    HCT 38.3 06/07/2022     03/01/2023     06/07/2022     BMP:   Lab Results   Component Value Date     03/01/2023     06/07/2022    POTASSIUM 3.8 03/01/2023    POTASSIUM 3.8 06/07/2022    CHLORIDE 101 03/01/2023    CHLORIDE 109 06/07/2022    CO2 28 03/01/2023    CO2 30 06/07/2022    BUN 7.5 03/01/2023    BUN 15 06/07/2022    CR 0.82 03/01/2023    CR 0.87 06/07/2022     (H) 03/01/2023     (H) 01/30/2023     COAGS: No results found for: PTT, INR, FIBR  POC:   Lab Results   Component Value Date    HCG Negative 06/07/2022     HEPATIC:   Lab Results   Component Value Date    ALBUMIN 4.3 03/01/2023    PROTTOTAL 7.0 03/01/2023    ALT 20 03/01/2023    AST 24 03/01/2023    ALKPHOS 91 03/01/2023    BILITOTAL 0.8 03/01/2023     OTHER:   Lab Results   Component Value Date    AMISH 9.4 03/01/2023    LIPASE 23 03/01/2023    TSH 2.40 09/23/2020       Anesthesia Plan    ASA Status:  1   NPO Status:  NPO Appropriate    Anesthesia Type: MAC.     - Reason for MAC: immobility needed, straight local not clinically adequate   Induction: Intravenous.   Maintenance: TIVA.        Consents    Anesthesia Plan(s) and associated risks, benefits, and realistic alternatives discussed. Questions answered and patient/representative(s) expressed understanding.    - Discussed:     - Discussed with:  Patient      - Extended Intubation/Ventilatory Support Discussed: No.      - Patient is DNR/DNI Status: No    Use of blood products discussed: No .     Postoperative Care    Pain management: IV analgesics, Oral pain medications, Multi-modal analgesia.   PONV prophylaxis: Ondansetron (or other 5HT-3), Background Propofol Infusion      Comments:                Patrice Peter MD

## 2023-04-25 NOTE — ANESTHESIA POSTPROCEDURE EVALUATION
Patient: Sharron Shipley    Procedure: Procedure(s):  ESOPHAGASTRODUODENOSCOPY- WITH DILATION USING SYLVIA DILATOR OVER GUIDEWIRE AND BIOPSIES       Anesthesia Type:  MAC    Note:  Disposition: Outpatient   Postop Pain Control: Uneventful            Sign Out: Well controlled pain   PONV: No   Neuro/Psych: Uneventful            Sign Out: Acceptable/Baseline neuro status   Airway/Respiratory: Uneventful            Sign Out: Acceptable/Baseline resp. status   CV/Hemodynamics: Uneventful            Sign Out: Acceptable CV status   Other NRE: NONE   DID A NON-ROUTINE EVENT OCCUR? No           Last vitals:  Vitals Value Taken Time   /88 04/25/23 1530   Temp 36.6  C (97.8  F) 04/25/23 1515   Pulse     Resp 16 04/25/23 1530   SpO2 100 % 04/25/23 1530       Electronically Signed By: Patrice Peter MD  April 25, 2023  4:21 PM

## 2023-04-25 NOTE — DISCHARGE INSTRUCTIONS
Discharge Instructions after Upper Endoscopy (EGD)       Activity and Diet   You were given medicine for pain. You may be dizzy or sleepy.   For 24 hours:    Do not drive or use heavy equipment.    Do not make important decisions.    Do not drink any alcohol.   ___ You may return to your regular diet.       Discomfort   You may have a sore throat for 2 to 3 days. It may help to:    Avoid hot liquids for 24 hours.    Use sore throat lozenges.    Gargle as needed with salt water up to 4 times a day. Mix 1 cup of warm water with 1 teaspoon of salt. Do not swallow.   ___ Your esophagus was dilated (opened) or banded during the exam:    Drink only cool liquids for the rest of the day. Eat a soft diet for the next few days.    You may have a sore chest for 2 to 3 days.       You may take Tylenol (acetaminophen) for pain unless your doctor has told you not to.       Do not take aspirin or ibuprofen (Advil, Motrin) or other NSAIDS (anti-inflammatory drugs) for ___ days.       Follow-up   ___ We took small tissue samples for study. If you do not have a follow-up visit scheduled, call your provider s office in 2 weeks for the results.       Other instructions________________________________________________________       When to call us:   Problems are rare. Call right away if you have:    Unusual throat pain or trouble swallowing    Unusual pain in belly or chest that is not relieved by belching or passing air    Black stools (tar-like looking bowel movement)    Temperature above 100.6  F. (37.5  C).       If you vomit blood or have severe pain, go to an emergency room.       If you have questions, call:   Monday to Friday, 7 a.m. to 4:30 p.m.: Endoscopy: [phone] (We may have to call you back)       After hours: Hospital: [phone] (Ask for the GI fellow on call)

## 2023-04-25 NOTE — ANESTHESIA CARE TRANSFER NOTE
Patient: Sharron Shipley    Procedure: Procedure(s):  ESOPHAGASTRODUODENOSCOPY- WITH DILATION USING JUDIT-JENFIER DILATOR OVER GUIDEWIRE AND BIOPSIES       Diagnosis: Lower esophageal ring (Schatzki) [K22.2]  Diagnosis Additional Information: No value filed.    Anesthesia Type:   MAC     Note:    Oropharynx: oropharynx clear of all foreign objects and spontaneously breathing  Level of Consciousness: awake and drowsy  Oxygen Supplementation: room air    Independent Airway: airway patency satisfactory and stable  Dentition: dentition unchanged  Vital Signs Stable: post-procedure vital signs reviewed and stable  Report to RN Given: handoff report given  Patient transferred to: Phase II    Handoff Report: Identifed the Patient, Identified the Reponsible Provider, Reviewed the pertinent medical history, Discussed the surgical course, Reviewed Intra-OP anesthesia mangement and issues during anesthesia, Set expectations for post-procedure period and Allowed opportunity for questions and acknowledgement of understanding      Vitals:  Vitals Value Taken Time   /86 04/25/23 1458   Temp 36.6  C (97.8  F) 04/25/23 1458   Pulse     Resp 16 04/25/23 1458   SpO2 97 % 04/25/23 1458       Electronically Signed By: PACHECO Pichardo CRNA  April 25, 2023  2:59 PM

## 2023-04-25 NOTE — H&P
Sharron Shipley  [id number]  female  52 year old      Reason for procedure/surgery: dysphagia, GERD    Patient Active Problem List   Diagnosis     Mild major depression (H)     Anxiety     Recurrent cold sores     Restless leg syndrome     Cervical high risk HPV (human papillomavirus) test positive     High cholesterol     Adjustment disorder with depressed mood     Kidney stone     Heartburn     H/O total hysterectomy       Past Surgical History:    Past Surgical History:   Procedure Laterality Date     ESOPHAGOSCOPY, GASTROSCOPY, DUODENOSCOPY (EGD), DILATATION, COMBINED N/A 05/10/2021    Procedure: ESOPHAGOGASTRODUODENOSCOPY, WITH DILATION of lower esophageal sphincter;  Surgeon: Valentin Taylor MD;  Location: [facility]     HYSTERECTOMY VAGINAL, BILATERAL SALPINGO-OOPHERECTOMY, COMBINED Bilateral 06/07/2022    Procedure: Transvaginal hysterectomy, bilateral salpingo-oophorectomy, cystoscopy ,Excision of vulvar Skin Tag;  Surgeon: Trini Scuhltz MD;  Location: [facility]       Past Medical History:   Past Medical History:   Diagnosis Date     Cervical high risk HPV (human papillomavirus) test positive 2020 2021     High cholesterol      PONV (postoperative nausea and vomiting)        Social History:   Social History     Tobacco Use     Smoking status: Never     Smokeless tobacco: Never   Vaping Use     Vaping status: Never Used   Substance Use Topics     Alcohol use: Not Currently     Comment: rare       Family History:   Family History   Problem Relation Age of Onset     Diabetes Mother      Hyperlipidemia Mother      Sleep Apnea Father      Diabetes Son      Attention Deficit Disorder Son      Attention Deficit Disorder Daughter      Diabetes Son        Allergies: No Known Allergies    Active Medications:   Current Outpatient Medications   Medication Sig Dispense Refill     atorvastatin (LIPITOR) 20 MG tablet Take 1 tablet (20 mg) by mouth daily 90 tablet 3     cyclobenzaprine (FLEXERIL) 10 MG tablet Take  "0.5-1 tablets (5-10 mg) by mouth 3 times daily as needed for muscle spasms 20 tablet 0     escitalopram (LEXAPRO) 10 MG tablet Take 1 tablet (10 mg) by mouth daily 90 tablet 1     Multiple Vitamin (MULTIVITAMIN ADULT PO)        omeprazole (PRILOSEC) 20 MG DR capsule Take 1 capsule (20 mg) by mouth daily 30 capsule 3     valACYclovir (VALTREX) 1000 mg tablet Take 1 tablet (1,000 mg) by mouth daily 90 tablet 3     vitamin B complex with vitamin C (STRESS TAB) tablet Take 1 tablet by mouth daily       VITAMIN D PO        valACYclovir (VALTREX) 1000 mg tablet Take 2 tablets (2,000 mg) by mouth 2 times daily 8 tablet 4     valACYclovir (VALTREX) 500 MG tablet TAKE 1 TABLET BY MOUTH ONE TABLET ONE TIME DAILY 30 tablet 6       Systemic Review:   ROS otherwise negative    Physical Examination:   Vital Signs: BP (!) 152/87   Pulse 69   Temp 97.8  F (36.6  C) (Temporal)   Resp 16   Ht 1.651 m (5' 5\")   Wt 74.8 kg (165 lb)   LMP  (LMP Unknown)   SpO2 98%   BMI 27.46 kg/m    GENERAL: healthy, alert and no distress  HENT: oropharynx clear and oral mucous membranes moist, Mallampati III  NECK: Normal ROM  RESP: lungs clear to auscultation - no rales, rhonchi or wheezes  CV: regular rate and rhythm, normal S1 S2,   ABDOMEN: soft, nontender, and bowel sounds normal  MS: no gross musculoskeletal defects noted, no edema    Plan: Appropriate to proceed as scheduled.      Cheryl Gomez MD  4/25/2023    PCP:  Lynsey Patel    "

## 2023-04-26 LAB
PATH REPORT.COMMENTS IMP SPEC: NORMAL
PATH REPORT.COMMENTS IMP SPEC: NORMAL
PATH REPORT.FINAL DX SPEC: NORMAL
PATH REPORT.GROSS SPEC: NORMAL
PATH REPORT.MICROSCOPIC SPEC OTHER STN: NORMAL
PATH REPORT.RELEVANT HX SPEC: NORMAL
PHOTO IMAGE: NORMAL

## 2023-05-22 ENCOUNTER — PATIENT OUTREACH (OUTPATIENT)
Dept: FAMILY MEDICINE | Facility: CLINIC | Age: 53
End: 2023-05-22
Payer: COMMERCIAL

## 2023-05-22 DIAGNOSIS — R87.810 CERVICAL HIGH RISK HPV (HUMAN PAPILLOMAVIRUS) TEST POSITIVE: Primary | ICD-10-CM

## 2023-07-17 NOTE — TELEPHONE ENCOUNTER
FYI to provider - Patient is lost to pap tracking follow-up. Attempts to contact pt have been made per reminder process and there has been no reply and/or no appt scheduled. Contact hx listed below.     2006, 2007, 2008, 2012 NIL Paps (Care Everywhere)  2017 NIL Pap, Neg HPV (Care Everywhere)  9/23/20 NIL Pap, + HR HPV (neg 16/18). [place] due in 1 year.   10/8/21 NIL Pap, + HR HPV (neg 16/18). [place] due by 1/8/2022.    3/3/22 Colpo bx SHAWN I, focal SHAWN II-III. Plan: LEEP due before 6/3/22  4/25/22 LEEP SHAWN I with positive margins, ECC neg. Plan: hyst  6/7/22 Hyst - cervix benign. Plan: cotest in 1 year  5/22/23 Reminder MyChart  6/20/23 Reminder call - left message  7/17/23 Lost to follow-up for pap tracking

## 2023-08-16 DIAGNOSIS — F41.9 ANXIETY: ICD-10-CM

## 2023-08-16 DIAGNOSIS — F32.0 MILD MAJOR DEPRESSION (H): ICD-10-CM

## 2023-08-16 DIAGNOSIS — B00.1 RECURRENT COLD SORES: ICD-10-CM

## 2023-08-17 RX ORDER — ESCITALOPRAM OXALATE 10 MG/1
10 TABLET ORAL DAILY
Qty: 90 TABLET | Refills: 1 | Status: SHIPPED | OUTPATIENT
Start: 2023-08-17 | End: 2024-04-15

## 2023-08-17 RX ORDER — VALACYCLOVIR HYDROCHLORIDE 500 MG/1
TABLET, FILM COATED ORAL
Qty: 90 TABLET | Refills: 2 | Status: SHIPPED | OUTPATIENT
Start: 2023-08-17 | End: 2024-03-21

## 2023-08-17 NOTE — TELEPHONE ENCOUNTER
"Requested Prescriptions   Pending Prescriptions Disp Refills    escitalopram (LEXAPRO) 10 MG tablet [Pharmacy Med Name: ESCITALOPRAM 10 MG TABLET] 90 tablet 1     Sig: TAKE 1 TABLET (10 MG) BY MOUTH DAILY.       SSRIs Protocol Failed - 8/16/2023  8:13 PM        Failed - PHQ-9 score less than 5 in past 6 months     Please review last PHQ-9 score.           Passed - Medication is active on med list        Passed - Patient is age 18 or older        Passed - No active pregnancy on record        Passed - No positive pregnancy test in last 12 months        Passed - Recent (6 mo) or future (30 days) visit within the authorizing provider's specialty     Patient had office visit in the last 6 months or has a visit in the next 30 days with authorizing provider or within the authorizing provider's specialty.  See \"Patient Info\" tab in inbasket, or \"Choose Columns\" in Meds & Orders section of the refill encounter.              valACYclovir (VALTREX) 500 MG tablet [Pharmacy Med Name: VALACYCLOVIR  MG TABLET] 90 tablet 2     Sig: TAKE 1 TABLET BY MOUTH ONE TABLET ONE TIME DAILY       Antivirals for Herpes Protocol Passed - 8/16/2023  8:13 PM        Passed - Patient is age 12 or older        Passed - Recent (12 mo) or future (30 days) visit within the authorizing provider's specialty     Patient has had an office visit with the authorizing provider or a provider within the authorizing providers department within the previous 12 mos or has a future within next 30 days. See \"Patient Info\" tab in inbasket, or \"Choose Columns\" in Meds & Orders section of the refill encounter.              Passed - Medication is active on med list        Passed - Normal serum creatinine on file in past 12 months     Recent Labs   Lab Test 03/01/23  1129   CR 0.82       Ok to refill medication if creatinine is low               "

## 2023-09-06 ENCOUNTER — OFFICE VISIT (OUTPATIENT)
Dept: FAMILY MEDICINE | Facility: CLINIC | Age: 53
End: 2023-09-06
Payer: COMMERCIAL

## 2023-09-06 VITALS
DIASTOLIC BLOOD PRESSURE: 86 MMHG | HEART RATE: 73 BPM | OXYGEN SATURATION: 99 % | HEIGHT: 65 IN | WEIGHT: 164 LBS | BODY MASS INDEX: 27.32 KG/M2 | TEMPERATURE: 98.2 F | RESPIRATION RATE: 14 BRPM | SYSTOLIC BLOOD PRESSURE: 126 MMHG

## 2023-09-06 DIAGNOSIS — G25.81 RESTLESS LEG SYNDROME: ICD-10-CM

## 2023-09-06 DIAGNOSIS — Z12.4 CERVICAL CANCER SCREENING: ICD-10-CM

## 2023-09-06 DIAGNOSIS — E78.5 HYPERLIPIDEMIA LDL GOAL <130: Primary | ICD-10-CM

## 2023-09-06 DIAGNOSIS — N95.1 SYMPTOMATIC MENOPAUSAL OR FEMALE CLIMACTERIC STATES: ICD-10-CM

## 2023-09-06 DIAGNOSIS — R53.83 OTHER FATIGUE: ICD-10-CM

## 2023-09-06 DIAGNOSIS — Z12.11 SCREEN FOR COLON CANCER: ICD-10-CM

## 2023-09-06 DIAGNOSIS — Z13.1 SCREENING FOR DIABETES MELLITUS: ICD-10-CM

## 2023-09-06 LAB
ALBUMIN SERPL BCG-MCNC: 4.2 G/DL (ref 3.5–5.2)
ALP SERPL-CCNC: 90 U/L (ref 35–104)
ALT SERPL W P-5'-P-CCNC: 78 U/L (ref 0–50)
ANION GAP SERPL CALCULATED.3IONS-SCNC: 10 MMOL/L (ref 7–15)
AST SERPL W P-5'-P-CCNC: 76 U/L (ref 0–45)
BILIRUB SERPL-MCNC: 0.6 MG/DL
BUN SERPL-MCNC: 13.3 MG/DL (ref 6–20)
CALCIUM SERPL-MCNC: 9.2 MG/DL (ref 8.6–10)
CHLORIDE SERPL-SCNC: 103 MMOL/L (ref 98–107)
CHOLEST SERPL-MCNC: 184 MG/DL
CREAT SERPL-MCNC: 0.83 MG/DL (ref 0.51–0.95)
DEPRECATED HCO3 PLAS-SCNC: 28 MMOL/L (ref 22–29)
EGFRCR SERPLBLD CKD-EPI 2021: 84 ML/MIN/1.73M2
ERYTHROCYTE [DISTWIDTH] IN BLOOD BY AUTOMATED COUNT: 12.3 % (ref 10–15)
FERRITIN SERPL-MCNC: 64 NG/ML (ref 11–328)
GLUCOSE SERPL-MCNC: 100 MG/DL (ref 70–99)
HBA1C MFR BLD: 5.2 % (ref 0–5.6)
HCT VFR BLD AUTO: 37.7 % (ref 35–47)
HDLC SERPL-MCNC: 47 MG/DL
HGB BLD-MCNC: 13 G/DL (ref 11.7–15.7)
IRON BINDING CAPACITY (ROCHE): 278 UG/DL (ref 240–430)
IRON SATN MFR SERPL: 34 % (ref 15–46)
IRON SERPL-MCNC: 95 UG/DL (ref 37–145)
LDLC SERPL CALC-MCNC: 112 MG/DL
MCH RBC QN AUTO: 31 PG (ref 26.5–33)
MCHC RBC AUTO-ENTMCNC: 34.5 G/DL (ref 31.5–36.5)
MCV RBC AUTO: 90 FL (ref 78–100)
NONHDLC SERPL-MCNC: 137 MG/DL
PLATELET # BLD AUTO: 204 10E3/UL (ref 150–450)
POTASSIUM SERPL-SCNC: 4.3 MMOL/L (ref 3.4–5.3)
PROT SERPL-MCNC: 6.8 G/DL (ref 6.4–8.3)
RBC # BLD AUTO: 4.19 10E6/UL (ref 3.8–5.2)
SODIUM SERPL-SCNC: 141 MMOL/L (ref 136–145)
TRIGL SERPL-MCNC: 123 MG/DL
TSH SERPL DL<=0.005 MIU/L-ACNC: 2.41 UIU/ML (ref 0.3–4.2)
WBC # BLD AUTO: 3.8 10E3/UL (ref 4–11)

## 2023-09-06 PROCEDURE — 83540 ASSAY OF IRON: CPT | Performed by: STUDENT IN AN ORGANIZED HEALTH CARE EDUCATION/TRAINING PROGRAM

## 2023-09-06 PROCEDURE — 85027 COMPLETE CBC AUTOMATED: CPT | Performed by: STUDENT IN AN ORGANIZED HEALTH CARE EDUCATION/TRAINING PROGRAM

## 2023-09-06 PROCEDURE — 99214 OFFICE O/P EST MOD 30 MIN: CPT | Performed by: STUDENT IN AN ORGANIZED HEALTH CARE EDUCATION/TRAINING PROGRAM

## 2023-09-06 PROCEDURE — 82728 ASSAY OF FERRITIN: CPT | Performed by: STUDENT IN AN ORGANIZED HEALTH CARE EDUCATION/TRAINING PROGRAM

## 2023-09-06 PROCEDURE — 36415 COLL VENOUS BLD VENIPUNCTURE: CPT | Performed by: STUDENT IN AN ORGANIZED HEALTH CARE EDUCATION/TRAINING PROGRAM

## 2023-09-06 PROCEDURE — G0145 SCR C/V CYTO,THINLAYER,RESCR: HCPCS | Performed by: STUDENT IN AN ORGANIZED HEALTH CARE EDUCATION/TRAINING PROGRAM

## 2023-09-06 PROCEDURE — 84443 ASSAY THYROID STIM HORMONE: CPT | Performed by: STUDENT IN AN ORGANIZED HEALTH CARE EDUCATION/TRAINING PROGRAM

## 2023-09-06 PROCEDURE — 80061 LIPID PANEL: CPT | Performed by: STUDENT IN AN ORGANIZED HEALTH CARE EDUCATION/TRAINING PROGRAM

## 2023-09-06 PROCEDURE — 87624 HPV HI-RISK TYP POOLED RSLT: CPT | Performed by: STUDENT IN AN ORGANIZED HEALTH CARE EDUCATION/TRAINING PROGRAM

## 2023-09-06 PROCEDURE — 83550 IRON BINDING TEST: CPT | Performed by: STUDENT IN AN ORGANIZED HEALTH CARE EDUCATION/TRAINING PROGRAM

## 2023-09-06 PROCEDURE — 80053 COMPREHEN METABOLIC PANEL: CPT | Performed by: STUDENT IN AN ORGANIZED HEALTH CARE EDUCATION/TRAINING PROGRAM

## 2023-09-06 PROCEDURE — 83036 HEMOGLOBIN GLYCOSYLATED A1C: CPT | Performed by: STUDENT IN AN ORGANIZED HEALTH CARE EDUCATION/TRAINING PROGRAM

## 2023-09-06 RX ORDER — CYCLOBENZAPRINE HCL 10 MG
10 TABLET ORAL 3 TIMES DAILY PRN
Qty: 30 TABLET | Refills: 1 | Status: SHIPPED | OUTPATIENT
Start: 2023-09-06 | End: 2024-05-19

## 2023-09-06 RX ORDER — ATORVASTATIN CALCIUM 20 MG/1
20 TABLET, FILM COATED ORAL DAILY
Qty: 90 TABLET | Refills: 3 | Status: SHIPPED | OUTPATIENT
Start: 2023-09-06 | End: 2024-04-10

## 2023-09-06 RX ORDER — ESTRADIOL 0.05 MG/D
1 PATCH, EXTENDED RELEASE TRANSDERMAL
Qty: 24 PATCH | Refills: 1 | Status: SHIPPED | OUTPATIENT
Start: 2023-09-07 | End: 2024-02-26

## 2023-09-06 ASSESSMENT — ENCOUNTER SYMPTOMS
PALPITATIONS: 0
HEARTBURN: 1
MYALGIAS: 0
JOINT SWELLING: 0
CONSTIPATION: 0
HEMATURIA: 0
HEMATOCHEZIA: 0
DIZZINESS: 0
FEVER: 0
BREAST MASS: 0
PARESTHESIAS: 0
CHILLS: 0
DIARRHEA: 0
FREQUENCY: 1
WEAKNESS: 0
NAUSEA: 0
EYE PAIN: 0
ABDOMINAL PAIN: 0
HEADACHES: 1
DYSURIA: 0
COUGH: 0
SORE THROAT: 0
SHORTNESS OF BREATH: 0
NERVOUS/ANXIOUS: 1

## 2023-09-06 ASSESSMENT — ANXIETY QUESTIONNAIRES
GAD7 TOTAL SCORE: 7
IF YOU CHECKED OFF ANY PROBLEMS ON THIS QUESTIONNAIRE, HOW DIFFICULT HAVE THESE PROBLEMS MADE IT FOR YOU TO DO YOUR WORK, TAKE CARE OF THINGS AT HOME, OR GET ALONG WITH OTHER PEOPLE: SOMEWHAT DIFFICULT
1. FEELING NERVOUS, ANXIOUS, OR ON EDGE: MORE THAN HALF THE DAYS
5. BEING SO RESTLESS THAT IT IS HARD TO SIT STILL: MORE THAN HALF THE DAYS
3. WORRYING TOO MUCH ABOUT DIFFERENT THINGS: NOT AT ALL
GAD7 TOTAL SCORE: 7
2. NOT BEING ABLE TO STOP OR CONTROL WORRYING: MORE THAN HALF THE DAYS
4. TROUBLE RELAXING: SEVERAL DAYS
7. FEELING AFRAID AS IF SOMETHING AWFUL MIGHT HAPPEN: NOT AT ALL
6. BECOMING EASILY ANNOYED OR IRRITABLE: NOT AT ALL

## 2023-09-06 ASSESSMENT — PATIENT HEALTH QUESTIONNAIRE - PHQ9
10. IF YOU CHECKED OFF ANY PROBLEMS, HOW DIFFICULT HAVE THESE PROBLEMS MADE IT FOR YOU TO DO YOUR WORK, TAKE CARE OF THINGS AT HOME, OR GET ALONG WITH OTHER PEOPLE: VERY DIFFICULT
SUM OF ALL RESPONSES TO PHQ QUESTIONS 1-9: 10
SUM OF ALL RESPONSES TO PHQ QUESTIONS 1-9: 10

## 2023-09-06 ASSESSMENT — PAIN SCALES - GENERAL: PAINLEVEL: NO PAIN (0)

## 2023-09-06 NOTE — PROGRESS NOTES
Assessment & Plan     Hyperlipidemia LDL goal <130  Restarted lipitor ~ Jan 2023. Will get lipid panel today.  - Lipid panel  - atorvastatin (LIPITOR) 20 MG tablet  Dispense: 90 tablet; Refill: 3    Screening for diabetes mellitus  - Hemoglobin A1c    Screen for colon cancer  - Colonoscopy Screening  Referral    Cervical cancer screening  -s/p hysterectomy w/cervix and ovaries; previously had CIN1 on LEEP  -needs one last pap after hystererctomy-done today  -Pap Screen with HPV - recommended age 30 - 65 years    Symptomatic menopausal or female climacteric states  Other fatigue  Fatigue, trouble sleeping, decreased libido. Ovaries removed with hysterectomy 1 year ago. Hormone replacement therapy risk and benefits were discussed. Pt is within 10yrs of menopause, no history of breast cancer, stroke, clots, vaginal bleeding. Non-smoker. S/P hysterectomy. Elected to start estradiol patch. Anticipate this to help with sleep, fatigue, and libido. Will also get labs below to evaluate fatigue.   - estradiol (VIVELLE-DOT) 0.05 MG/24HR bi-weekly patch  Dispense: 24 patch; Refill: 1  - CBC with platelets  - Iron and iron binding capacity  - Ferritin  - TSH with free T4 reflex  - Comprehensive metabolic panel    Restless leg syndrome  - cyclobenzaprine (FLEXERIL) 10 MG tablet  Dispense: 30 tablet; Refill: 1        Michael Tejada DO  [facility]    David Arenas is a 52 year old, presenting for the following health issues:  Gyn Exam (Med check/Been fatigue/AC1 check/Colonoscopy referral)        9/6/2023     9:18 AM   Additional Questions   Roomed by An SCOTT     Patient presents with:  Gyn Exam: Med check  Been fatigue  AC1 check  Colonoscopy referral    Healthy Habits:     Getting at least 3 servings of Calcium per day:  Yes    Bi-annual eye exam:  Yes    Dental care twice a year:  Yes    Sleep apnea or symptoms of sleep apnea:  Daytime drowsiness    Diet:  Regular (no  restrictions)    Frequency of exercise:  None    Taking medications regularly:  Yes    Medication side effects:  None    Additional concerns today:  Yes     The 10-year ASCVD risk score (Ronaldo BARAKAT, et al., 2019) is: 2.5%    Values used to calculate the score:      Age: 52 years      Sex: Female      Is Non- : No      Diabetic: No      Tobacco smoker: No      Systolic Blood Pressure: 126 mmHg      Is BP treated: No      HDL Cholesterol: 50 mg/dL      Total Cholesterol: 266 mg/dL    Pap/hpv  -s/p hysterectomy w/cervix and ovaries; previously had CIN1 on LEEP  -needs one last pap after hystererctomy    Paps:  2006, 2007, 2008, 2012 NIL Paps (Care Everywhere)  2017 NIL Pap, Neg HPV (Care Everywhere)  9/23/20 NIL Pap, + HR HPV (neg 16/18). Cotest due in 1 year.   10/8/21 NIL Pap, + HR HPV (neg 16/18). [place] due by 1/8/2022.   3/3/22 Colpo bx SHAWN I, focal SHAWN II-III. Plan: LEEP due before 6/3/22  4/25/22 LEEP SHAWN I with positive margins, ECC neg. Plan: hyst  6/7/22 Hyst - cervix benign. Plan: cotest in 1 year    Trouble focusing  Kids have ADHD    Fatigue  Some trouble falling asleep  Muscle relaxer helped  Symptoms started 2-3 weeks    Mammo-DUE  Colon cancer screening-DUE (last done 2021)    Cold sores  -valtrex 1000mg daily  -2000mg BID for flares    Hx HLD  -was off statin, cholesterol still high in Jan, recommended restarting lipitor    Glucose borderline  -could check A1c today    MDD  Anxiety  -switched to lexapro 10mg daily in Jan, how is this going?  -previously on prozac      Review of Systems   Constitutional:  Negative for chills and fever.   HENT:  Negative for congestion, ear pain, hearing loss and sore throat.    Eyes:  Negative for pain and visual disturbance.   Respiratory:  Negative for cough and shortness of breath.    Cardiovascular:  Negative for chest pain, palpitations and peripheral edema.   Gastrointestinal:  Positive for heartburn. Negative for abdominal pain, constipation,  "diarrhea, hematochezia and nausea.   Breasts:  Negative for tenderness, breast mass and discharge.   Genitourinary:  Positive for frequency and urgency. Negative for dysuria, genital sores, hematuria, pelvic pain, vaginal bleeding and vaginal discharge.   Musculoskeletal:  Negative for joint swelling and myalgias.   Skin:  Negative for rash.   Neurological:  Positive for headaches. Negative for dizziness, weakness and paresthesias.   Psychiatric/Behavioral:  Negative for mood changes. The patient is nervous/anxious.         Objective    /86 (BP Location: Right arm, Patient Position: Sitting, Cuff Size: Adult Regular)   Pulse 73   Temp 98.2  F (36.8  C) (Temporal)   Resp 14   Ht 1.651 m (5' 5\")   Wt 74.4 kg (164 lb)   LMP  (LMP Unknown)   SpO2 99%   BMI 27.29 kg/m    Body mass index is 27.29 kg/m .        1/30/2023     1:10 PM 2/22/2023     5:03 PM 9/6/2023     9:02 AM   PHQ   PHQ-9 Total Score 9 10 10   Q9: Thoughts of better off dead/self-harm past 2 weeks Not at all Not at all Not at all         5/9/2022     2:30 PM 2/22/2023     5:03 PM 9/6/2023     9:04 AM   CRYSTAL-7 SCORE   Total Score   7 (mild anxiety)   Total Score 0 6 7         Physical Exam  Constitutional:       General: She is not in acute distress.     Appearance: She is not ill-appearing.   Genitourinary:     Vagina: Normal. No vaginal discharge or lesions.      Comments: S/P hysterectomy so cervix is absent  Neurological:      Mental Status: She is alert.                Answers submitted by the patient for this visit:  Patient Health Questionnaire (Submitted on 9/6/2023)  If you checked off any problems, how difficult have these problems made it for you to do your work, take care of things at home, or get along with other people?: Very difficult  PHQ9 TOTAL SCORE: 10  CRYSTAL-7 (Submitted on 9/6/2023)  CRYSTAL 7 TOTAL SCORE: 7    "

## 2023-09-06 NOTE — PROGRESS NOTES
{PROVIDER CHARTING PREFERENCE:095654}    Subjective   Dagoberto is a 52 year old, presenting for the following health issues:  Gyn Exam (Med check/Been fatigue/AC1 check)      9/6/2023     9:18 AM   Additional Questions   Roomed by An SCOTT     Patient presents with:  Gyn Exam: Med check  Been fatigue  AC1 check      Healthy Habits:     Getting at least 3 servings of Calcium per day:  Yes    Bi-annual eye exam:  Yes    Dental care twice a year:  Yes    Sleep apnea or symptoms of sleep apnea:  Daytime drowsiness    Diet:  Regular (no restrictions)    Frequency of exercise:  None    Taking medications regularly:  Yes    Medication side effects:  None    Additional concerns today:  Yes       {SUPERLIST (Optional):166654}  {additonal problems for provider to add (Optional):295960}      Review of Systems   Constitutional:  Negative for chills and fever.   HENT:  Negative for congestion, ear pain, hearing loss and sore throat.    Eyes:  Negative for pain and visual disturbance.   Respiratory:  Negative for cough and shortness of breath.    Cardiovascular:  Negative for chest pain, palpitations and peripheral edema.   Gastrointestinal:  Positive for heartburn. Negative for abdominal pain, constipation, diarrhea, hematochezia and nausea.   Breasts:  Negative for tenderness, breast mass and discharge.   Genitourinary:  Positive for frequency and urgency. Negative for dysuria, genital sores, hematuria, pelvic pain, vaginal bleeding and vaginal discharge.   Musculoskeletal:  Negative for joint swelling and myalgias.   Skin:  Negative for rash.   Neurological:  Positive for headaches. Negative for dizziness, weakness and paresthesias.   Psychiatric/Behavioral:  Negative for mood changes. The patient is nervous/anxious.       {ROS COMP (Optional):068958}      Objective    /86 (BP Location: Right arm, Patient Position: Sitting, Cuff Size: Adult Regular)   Pulse 73   Temp 98.2  F (36.8  C) (Temporal)   Resp 14   Ht 1.651 m  "(5' 5\")   Wt 74.4 kg (164 lb)   LMP  (LMP Unknown)   SpO2 99%   BMI 27.29 kg/m    Body mass index is 27.29 kg/m .  Physical Exam   {Exam List (Optional):034108}    {Diagnostic Test Results (Optional):662355}    {AMBULATORY ATTESTATION (Optional):094023}              "

## 2023-09-08 DIAGNOSIS — R79.89 ELEVATED LFTS: Primary | ICD-10-CM

## 2023-09-08 LAB
BKR LAB AP GYN ADEQUACY: NORMAL
BKR LAB AP GYN INTERPRETATION: NORMAL
BKR LAB AP HPV REFLEX: NORMAL
BKR LAB AP PREVIOUS ABNL DX: NORMAL
BKR LAB AP PREVIOUS ABNORMAL: NORMAL
PATH REPORT.COMMENTS IMP SPEC: NORMAL
PATH REPORT.COMMENTS IMP SPEC: NORMAL
PATH REPORT.RELEVANT HX SPEC: NORMAL

## 2023-09-11 LAB
HUMAN PAPILLOMA VIRUS 16 DNA: NEGATIVE
HUMAN PAPILLOMA VIRUS 18 DNA: NEGATIVE
HUMAN PAPILLOMA VIRUS FINAL DIAGNOSIS: NORMAL
HUMAN PAPILLOMA VIRUS OTHER HR: NEGATIVE

## 2023-09-18 ENCOUNTER — PATIENT OUTREACH (OUTPATIENT)
Dept: FAMILY MEDICINE | Facility: CLINIC | Age: 53
End: 2023-09-18
Payer: COMMERCIAL

## 2023-10-09 ENCOUNTER — LAB (OUTPATIENT)
Dept: LAB | Facility: CLINIC | Age: 53
End: 2023-10-09
Payer: COMMERCIAL

## 2023-10-09 DIAGNOSIS — R79.89 ELEVATED LFTS: ICD-10-CM

## 2023-10-09 LAB
ALT SERPL W P-5'-P-CCNC: 18 U/L (ref 0–50)
AST SERPL W P-5'-P-CCNC: 29 U/L (ref 0–45)

## 2023-10-09 PROCEDURE — 36415 COLL VENOUS BLD VENIPUNCTURE: CPT

## 2023-10-09 PROCEDURE — 84460 ALANINE AMINO (ALT) (SGPT): CPT

## 2023-10-09 PROCEDURE — 84450 TRANSFERASE (AST) (SGOT): CPT

## 2023-10-19 ENCOUNTER — OFFICE VISIT (OUTPATIENT)
Dept: ORTHOPEDICS | Facility: CLINIC | Age: 53
End: 2023-10-19
Payer: COMMERCIAL

## 2023-10-19 ENCOUNTER — ANCILLARY PROCEDURE (OUTPATIENT)
Dept: GENERAL RADIOLOGY | Facility: CLINIC | Age: 53
End: 2023-10-19
Attending: FAMILY MEDICINE
Payer: COMMERCIAL

## 2023-10-19 VITALS — BODY MASS INDEX: 26.86 KG/M2 | WEIGHT: 161.2 LBS | HEIGHT: 65 IN

## 2023-10-19 DIAGNOSIS — M25.512 LEFT SHOULDER PAIN: ICD-10-CM

## 2023-10-19 DIAGNOSIS — M54.2 NECK PAIN: ICD-10-CM

## 2023-10-19 DIAGNOSIS — M25.512 ACUTE PAIN OF LEFT SHOULDER: Primary | ICD-10-CM
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
## 2023-10-19 PROCEDURE — 73030 X-RAY EXAM OF SHOULDER: CPT | Mod: TC | Performed by: RADIOLOGY

## 2023-10-19 PROCEDURE — 99204 OFFICE O/P NEW MOD 45 MIN: CPT | Performed by: FAMILY MEDICINE

## 2023-10-19 PROCEDURE — 72040 X-RAY EXAM NECK SPINE 2-3 VW: CPT | Mod: TC | Performed by: RADIOLOGY

## 2023-10-19 ASSESSMENT — PAIN SCALES - GENERAL: PAINLEVEL: EXTREME PAIN (8)
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
## 2024-01-04 ENCOUNTER — PATIENT OUTREACH (OUTPATIENT)
Dept: CARE COORDINATION | Facility: CLINIC | Age: 54
End: 2024-01-04
Payer: COMMERCIAL

## 2024-01-09 ENCOUNTER — E-VISIT (OUTPATIENT)
Dept: URGENT CARE | Facility: CLINIC | Age: 54
End: 2024-01-09
Payer: COMMERCIAL

## 2024-01-09 DIAGNOSIS — R05.2 SUBACUTE COUGH: ICD-10-CM

## 2024-01-09 DIAGNOSIS — R05.1 ACUTE COUGH: Primary | ICD-10-CM

## 2024-01-09 PROCEDURE — 99421 OL DIG E/M SVC 5-10 MIN: CPT | Performed by: EMERGENCY MEDICINE

## 2024-01-09 RX ORDER — BENZONATATE 200 MG/1
200 CAPSULE ORAL 3 TIMES DAILY PRN
Qty: 30 CAPSULE | Refills: 0 | Status: SHIPPED | OUTPATIENT
Start: 2024-01-09 | End: 2024-04-15

## 2024-01-09 RX ORDER — AZITHROMYCIN 250 MG/1
TABLET, FILM COATED ORAL
Qty: 6 TABLET | Refills: 0 | Status: SHIPPED | OUTPATIENT
Start: 2024-01-09 | End: 2024-01-14

## 2024-01-09 RX ORDER — CODEINE PHOSPHATE AND GUAIFENESIN 10; 100 MG/5ML; MG/5ML
1-2 SOLUTION ORAL EVERY 4 HOURS PRN
Qty: 237 ML | Refills: 0 | Status: SHIPPED | OUTPATIENT
Start: 2024-01-09 | End: 2024-04-15

## 2024-01-15 ENCOUNTER — TELEPHONE (OUTPATIENT)
Dept: OBGYN | Facility: CLINIC | Age: 54
End: 2024-01-15
Payer: COMMERCIAL

## 2024-01-15 NOTE — TELEPHONE ENCOUNTER
Panel Management Review        Health Maintenance List    Health Maintenance   Topic Date Due    DEPRESSION ACTION PLAN  Never done    HEPATITIS B IMMUNIZATION (1 of 3 - 3-dose series) Never done    ZOSTER IMMUNIZATION (1 of 2) Never done    COLORECTAL CANCER SCREENING  02/24/2022    MAMMO SCREENING  11/16/2022    INFLUENZA VACCINE (1) 09/01/2023    COVID-19 Vaccine (2 - 2023-24 season) 09/01/2023    DEPRESSION 12 MO INDEX REPEAT PHQ-9  12/24/2023    YEARLY PREVENTIVE VISIT  01/30/2024    PHQ-9  03/06/2024    PAP FOLLOW-UP  09/06/2024    HPV FOLLOW-UP  09/06/2024    ADVANCE CARE PLANNING  10/08/2026    LIPID  09/06/2028    DTAP/TDAP/TD IMMUNIZATION (2 - Td or Tdap) 09/19/2028    HEPATITIS C SCREENING  Completed    HIV SCREENING  Completed    Pneumococcal Vaccine: Pediatrics (0 to 5 Years) and At-Risk Patients (6 to 64 Years)  Aged Out    IPV IMMUNIZATION  Aged Out    HPV IMMUNIZATION  Aged Out    MENINGITIS IMMUNIZATION  Aged Out    RSV MONOCLONAL ANTIBODY  Aged Out    PAP  Discontinued       Composite cancer screening  Chart review shows that this patient is due/due soon for the following Colonoscopy  Lab Results   Component Value Date    PAP NIL 09/23/2020     Past Surgical History:   Procedure Laterality Date    ESOPHAGOSCOPY, GASTROSCOPY, DUODENOSCOPY (EGD), DILATATION, COMBINED N/A 05/10/2021    Procedure: ESOPHAGOGASTRODUODENOSCOPY, WITH DILATION of lower esophageal sphincter;  Surgeon: Valentin Taylor MD;  Location: [facility]    HYSTERECTOMY VAGINAL, BILATERAL SALPINGO-OOPHERECTOMY, COMBINED Bilateral 06/07/2022    Procedure: Transvaginal hysterectomy, bilateral salpingo-oophorectomy, cystoscopy ,Excision of vulvar Skin Tag;  Surgeon: Trini Schultz MD;  Location: [facility]       Is hysterectomy listed in surgical history? Yes   Is mastectomy listed in surgical history? No     Summary:    Patient is due/failing the following:   Colonoscopy    Action needed: Patient needs office visit for  colonoscopy.    Type of outreach:  Sent [employer] Geeks[employer] message.      Staff Signature:  Kayley Andersen MA

## 2024-01-15 NOTE — LETTER
January 15, 2024      Sharron Shipley  [address] [address]        Dear Sharron,     To ensure we are providing the best quality care, we have reviewed your chart and see that you are due for:    Colon Cancer Screening:    If you have received a referral to schedule a Colonoscopy or choose to do a Colonoscopy instead of the FIT/ Cologuard test, please call [phone] to schedule.    If you have received a FIT test kit from a prior office visit, please check the expiration date. If , please get a new kit from the Lab/ Clinic. If not , please complete the test and mail in as soon as you are able.    If you would prefer to complete a Cologuard test, please reach out to your provider to see if this is an option for you.    You may call Dept: [phone] if you have any questions. If you have completed the tests outside of [facility], please have the results forwarded to our office Fax # [phone]. We will update the chart for your primary Physician to review before your next annual physical.            Sincerely,        Angelica Dominguez MD

## 2024-01-18 ENCOUNTER — MYC REFILL (OUTPATIENT)
Dept: FAMILY MEDICINE | Facility: CLINIC | Age: 54
End: 2024-01-18
Payer: COMMERCIAL

## 2024-01-18 DIAGNOSIS — R12 HEARTBURN: ICD-10-CM

## 2024-01-28 ENCOUNTER — MYC MEDICAL ADVICE (OUTPATIENT)
Dept: FAMILY MEDICINE | Facility: CLINIC | Age: 54
End: 2024-01-28
Payer: COMMERCIAL

## 2024-01-30 NOTE — TELEPHONE ENCOUNTER
Message sent via Sounday.    Rosy Forrest, RN, BSN, PHN  [facility]  [phone]

## 2024-02-26 DIAGNOSIS — N95.1 SYMPTOMATIC MENOPAUSAL OR FEMALE CLIMACTERIC STATES: ICD-10-CM

## 2024-02-26 RX ORDER — ESTRADIOL 0.05 MG/D
PATCH, EXTENDED RELEASE TRANSDERMAL
Qty: 8 PATCH | Refills: 0 | Status: SHIPPED | OUTPATIENT
Start: 2024-02-26 | End: 2024-03-20

## 2024-03-16 ENCOUNTER — HEALTH MAINTENANCE LETTER (OUTPATIENT)
Age: 54
End: 2024-03-16

## 2024-03-19 DIAGNOSIS — N95.1 SYMPTOMATIC MENOPAUSAL OR FEMALE CLIMACTERIC STATES: ICD-10-CM

## 2024-03-20 RX ORDER — ESTRADIOL 0.05 MG/D
PATCH, EXTENDED RELEASE TRANSDERMAL
Qty: 24 PATCH | Refills: 1 | Status: SHIPPED | OUTPATIENT
Start: 2024-03-20 | End: 2024-03-22

## 2024-03-20 RX ORDER — OLOPATADINE HYDROCHLORIDE 1 MG/ML
SOLUTION/ DROPS OPHTHALMIC
COMMUNITY
Start: 2023-11-02 | End: 2024-04-15

## 2024-03-21 ENCOUNTER — OFFICE VISIT (OUTPATIENT)
Dept: FAMILY MEDICINE | Facility: CLINIC | Age: 54
End: 2024-03-21
Payer: COMMERCIAL

## 2024-03-21 ENCOUNTER — ORDERS ONLY (AUTO-RELEASED) (OUTPATIENT)
Dept: FAMILY MEDICINE | Facility: CLINIC | Age: 54
End: 2024-03-21

## 2024-03-21 VITALS
DIASTOLIC BLOOD PRESSURE: 78 MMHG | HEIGHT: 65 IN | WEIGHT: 153.5 LBS | HEART RATE: 59 BPM | SYSTOLIC BLOOD PRESSURE: 129 MMHG | TEMPERATURE: 97.2 F | RESPIRATION RATE: 16 BRPM | BODY MASS INDEX: 25.58 KG/M2 | OXYGEN SATURATION: 100 %

## 2024-03-21 DIAGNOSIS — F32.0 MILD MAJOR DEPRESSION (H): ICD-10-CM

## 2024-03-21 DIAGNOSIS — N95.1 SYMPTOMATIC MENOPAUSAL OR FEMALE CLIMACTERIC STATES: ICD-10-CM

## 2024-03-21 DIAGNOSIS — R41.840 CONCENTRATION DEFICIT: ICD-10-CM

## 2024-03-21 DIAGNOSIS — R87.810 CERVICAL HIGH RISK HPV (HUMAN PAPILLOMAVIRUS) TEST POSITIVE: ICD-10-CM

## 2024-03-21 DIAGNOSIS — E78.00 HIGH CHOLESTEROL: ICD-10-CM

## 2024-03-21 DIAGNOSIS — F41.9 ANXIETY: ICD-10-CM

## 2024-03-21 DIAGNOSIS — Z12.11 SCREEN FOR COLON CANCER: ICD-10-CM

## 2024-03-21 DIAGNOSIS — Z13.1 SCREENING FOR DIABETES MELLITUS: ICD-10-CM

## 2024-03-21 DIAGNOSIS — Z12.31 VISIT FOR SCREENING MAMMOGRAM: ICD-10-CM

## 2024-03-21 DIAGNOSIS — B00.1 RECURRENT COLD SORES: ICD-10-CM

## 2024-03-21 DIAGNOSIS — Z13.39 ADHD (ATTENTION DEFICIT HYPERACTIVITY DISORDER) EVALUATION: ICD-10-CM

## 2024-03-21 DIAGNOSIS — Z00.00 ROUTINE GENERAL MEDICAL EXAMINATION AT A HEALTH CARE FACILITY: Primary | ICD-10-CM

## 2024-03-21 LAB — HBA1C MFR BLD: 5.2 % (ref 0–5.6)

## 2024-03-21 PROCEDURE — 99396 PREV VISIT EST AGE 40-64: CPT | Performed by: STUDENT IN AN ORGANIZED HEALTH CARE EDUCATION/TRAINING PROGRAM

## 2024-03-21 PROCEDURE — 99214 OFFICE O/P EST MOD 30 MIN: CPT | Mod: 25 | Performed by: STUDENT IN AN ORGANIZED HEALTH CARE EDUCATION/TRAINING PROGRAM

## 2024-03-21 PROCEDURE — 83036 HEMOGLOBIN GLYCOSYLATED A1C: CPT | Performed by: STUDENT IN AN ORGANIZED HEALTH CARE EDUCATION/TRAINING PROGRAM

## 2024-03-21 PROCEDURE — 80061 LIPID PANEL: CPT | Performed by: STUDENT IN AN ORGANIZED HEALTH CARE EDUCATION/TRAINING PROGRAM

## 2024-03-21 PROCEDURE — 36415 COLL VENOUS BLD VENIPUNCTURE: CPT | Performed by: STUDENT IN AN ORGANIZED HEALTH CARE EDUCATION/TRAINING PROGRAM

## 2024-03-21 PROCEDURE — 96127 BRIEF EMOTIONAL/BEHAV ASSMT: CPT | Performed by: STUDENT IN AN ORGANIZED HEALTH CARE EDUCATION/TRAINING PROGRAM

## 2024-03-21 RX ORDER — ESTRADIOL 10 UG/1
10 INSERT VAGINAL
Qty: 24 TABLET | Refills: 3 | Status: SHIPPED | OUTPATIENT
Start: 2024-03-21

## 2024-03-21 RX ORDER — ESTRADIOL 0.1 MG/D
1 FILM, EXTENDED RELEASE TRANSDERMAL
Qty: 24 PATCH | Refills: 3 | Status: SHIPPED | OUTPATIENT
Start: 2024-03-21

## 2024-03-21 RX ORDER — VALACYCLOVIR HYDROCHLORIDE 1 G/1
1000 TABLET, FILM COATED ORAL DAILY
Qty: 90 TABLET | Refills: 3 | Status: SHIPPED | OUTPATIENT
Start: 2024-03-21

## 2024-03-21 RX ORDER — ATORVASTATIN CALCIUM 20 MG/1
20 TABLET, FILM COATED ORAL DAILY
Qty: 90 TABLET | Refills: 3 | Status: CANCELLED | OUTPATIENT
Start: 2024-03-21

## 2024-03-21 RX ORDER — ESTRADIOL 0.05 MG/D
PATCH, EXTENDED RELEASE TRANSDERMAL
Qty: 24 PATCH | Refills: 1 | Status: CANCELLED | OUTPATIENT
Start: 2024-03-21

## 2024-03-21 RX ORDER — VALACYCLOVIR HYDROCHLORIDE 500 MG/1
TABLET, FILM COATED ORAL
Qty: 30 TABLET | Refills: 3 | Status: SHIPPED | OUTPATIENT
Start: 2024-03-21

## 2024-03-21 RX ORDER — ESCITALOPRAM OXALATE 10 MG/1
10 TABLET ORAL DAILY
Qty: 90 TABLET | Refills: 1 | Status: CANCELLED | OUTPATIENT
Start: 2024-03-21

## 2024-03-21 SDOH — HEALTH STABILITY: PHYSICAL HEALTH
ON AVERAGE, HOW MANY DAYS PER WEEK DO YOU ENGAGE IN MODERATE TO STRENUOUS EXERCISE (LIKE A BRISK WALK)?: PATIENT DECLINED

## 2024-03-21 SDOH — HEALTH STABILITY: PHYSICAL HEALTH: ON AVERAGE, HOW MANY MINUTES DO YOU ENGAGE IN EXERCISE AT THIS LEVEL?: PATIENT DECLINED

## 2024-03-21 ASSESSMENT — ANXIETY QUESTIONNAIRES
5. BEING SO RESTLESS THAT IT IS HARD TO SIT STILL: NOT AT ALL
GAD7 TOTAL SCORE: 0
7. FEELING AFRAID AS IF SOMETHING AWFUL MIGHT HAPPEN: NOT AT ALL
8. IF YOU CHECKED OFF ANY PROBLEMS, HOW DIFFICULT HAVE THESE MADE IT FOR YOU TO DO YOUR WORK, TAKE CARE OF THINGS AT HOME, OR GET ALONG WITH OTHER PEOPLE?: NOT DIFFICULT AT ALL
2. NOT BEING ABLE TO STOP OR CONTROL WORRYING: NOT AT ALL
1. FEELING NERVOUS, ANXIOUS, OR ON EDGE: NOT AT ALL
4. TROUBLE RELAXING: NOT AT ALL
GAD7 TOTAL SCORE: 0
IF YOU CHECKED OFF ANY PROBLEMS ON THIS QUESTIONNAIRE, HOW DIFFICULT HAVE THESE PROBLEMS MADE IT FOR YOU TO DO YOUR WORK, TAKE CARE OF THINGS AT HOME, OR GET ALONG WITH OTHER PEOPLE: NOT DIFFICULT AT ALL
7. FEELING AFRAID AS IF SOMETHING AWFUL MIGHT HAPPEN: NOT AT ALL
GAD7 TOTAL SCORE: 0
3. WORRYING TOO MUCH ABOUT DIFFERENT THINGS: NOT AT ALL
6. BECOMING EASILY ANNOYED OR IRRITABLE: NOT AT ALL

## 2024-03-21 ASSESSMENT — SOCIAL DETERMINANTS OF HEALTH (SDOH): HOW OFTEN DO YOU GET TOGETHER WITH FRIENDS OR RELATIVES?: MORE THAN THREE TIMES A WEEK

## 2024-03-21 ASSESSMENT — PATIENT HEALTH QUESTIONNAIRE - PHQ9
SUM OF ALL RESPONSES TO PHQ QUESTIONS 1-9: 0
SUM OF ALL RESPONSES TO PHQ QUESTIONS 1-9: 0
10. IF YOU CHECKED OFF ANY PROBLEMS, HOW DIFFICULT HAVE THESE PROBLEMS MADE IT FOR YOU TO DO YOUR WORK, TAKE CARE OF THINGS AT HOME, OR GET ALONG WITH OTHER PEOPLE: NOT DIFFICULT AT ALL

## 2024-03-21 ASSESSMENT — PAIN SCALES - GENERAL: PAINLEVEL: MILD PAIN (3)

## 2024-03-21 NOTE — COMMUNITY RESOURCES LIST (ENGLISH)
March 21, 2024           YOUR PERSONALIZED LIST OF SERVICES & PROGRAMS           & SHELTER    Housing      Free - Client Services  [address] [place] [address] (Distance: 4.3 miles)  Phone: [phone]  Website: https://NYCareerElite.Rolith/  Language: English  Fee: Free  Transportation Options: Free transportation      [employer] [facility] [employer]  Phone: [phone]  Website: https://www.Southern Implants.org/  Language: English      [facility] - Housing Stabilization Services  Phone: [phone]  Website: https://iPawn/Housing-Stabilization.html  Language: English  Hours: Mon 9:00 AM - 5:00 PM Tue 9:00 AM - 5:00 PM Wed 9:00 AM - 5:00 PM Thu 9:00 AM - 5:00 PM Fri 9:00 AM - 5:00 PM  Fee: Insurance  Accessibility: Deaf or hard of hearing, Translation services    Case Management      Living - Housing Stabilization Services  [address] [place] [address] (Distance: 3.1 miles)  Phone: [phone]  Website: https://CliqSearch.ecoVent  Language: Sinhala, English, Djiboutian  Fee: Insurance, Self pay      [employer] - Housing Stabilization Services  Phone: [phone]  Website: https://homebasemn.com/  Language: English  Hours: Mon 8:00 AM - 4:00 PM Tue 8:00 AM - 4:00 PM Wed 8:00 AM - 4:00 PM Thu 8:00 AM - 4:00 PM Fri 8:00 AM - 4:00 PM  Fee: Free  Accessibility: Blind accommodation, Deaf or hard of hearing  Transportation Options: Free transportation      [facility] Housing Stabilization Services (HSS)  Phone: [phone]  Website: https://www.Sports Weather MediadayIntimate Bridge 2 Conception.Rolith/resources  Language: English, Arabic  Hours: Mon 8:00 AM - 4:00 PM Tue 8:00 AM - 4:00 PM Wed 8:00 AM - 4:00 PM Thu 8:00 AM - 4:00 PM Fri 8:00 AM - 4:00 PM  Fee: Free, Insurance  Accessibility: Ada accessible, Blind accommodation, Deaf or hard of hearing, Translation services    Drop-In Services      [facility] - [place] - Warming or cooling center - [place]  [facility] - [place] - [facility]  [address] [place] [address] (Distance: 0.6 miles)  Language: English, Citizen of [place]  Fee: Free      [facility] [place] - Warming or cooling center - [facility] - [place] [facility]  [address] [place] [address] (Distance: 1.3 miles)  Phone: [phone]  Language: English, Citizen of [place], Brazilian  Fee: Free  Accessibility: Translation services      [employer] - LAUNDRY LOVE  Website: http://www.laundrylove.org            Bill Payment Assistance      [employer] [facility] [place] [facility] [employer]  [address] [place] [address] (Distance: 1.5 miles)  Phone: [phone]  Language: English, Citizen of [place]  Fee: Free  Accessibility: Ada accessible, Translation services      [facility] - Utility payment assistance  [address] [place] [address] (Distance: 3.1 miles)  Phone: [phone]  Website: http://www.saintolaf.Starline/  Language: English  Fee: Free  Accessibility: Ada accessible      - Dislocated Worker/Adult [employer] Employment Program  Phone: ([phone] [phone]  Email: nadine@TokBox  Website: https://TokBox/services/employment-services/dislocated-worker-program/  Language: English, Brazilian  Hours: Mon 8:00 AM - 4:30 PM Tue 8:00 AM - 4:30 PM Wed 8:00 AM - 4:30 PM Thu 8:00 AM - 4:30 PM Fri 8:00 AM - 4:30 PM  Fee: Free  Accessibility: Ada accessible               IMPORTANT NUMBERS & WEBSITES        Emergency Services  911  .   United Way  211 http://211unitedway.org  .   Poison Control  (731) 566-5019 http://[place]poison.org http://wisconsinpoison.org  .     Suicide and Crisis Lifeline  988 http://988lifeline.org  .   Childhelp National Child Abuse Hotline  607.152.5980 http://Childhelphotline.org   .   National Sexual Assault Hotline  (173) 307-4837 (HOPE) http://Rainn.org   .     National Runaway Safeline  (834) 593-7213 (RUNAWAY) http://1800runaway.org  .   Pregnancy & Postpartum Support  Call/text 262-116-6295  [place]: http://ppsupportmn.org  [place]:  http://psichapters.com/wi  .   Substance Abuse National Helpline (SAMHSA)  800-622-HELP (6436) http://Findtreatment.gov   .                DISCLAIMER: [employer] does not endorse any service providers mentioned in this resource list. [employer] does not guarantee that the services mentioned in this resource list will be available to you or will improve your health or wellness.    [facility]

## 2024-03-21 NOTE — PATIENT INSTRUCTIONS
Preventive Care Advice   This is general advice given by our system to help you stay healthy. However, your care team may have specific advice just for you. Please talk to your care team about your preventive care needs.  Nutrition  Eat 5 or more servings of fruits and vegetables each day.  Try wheat bread, brown rice and whole grain pasta (instead of white bread, rice, and pasta).  Get enough calcium and vitamin D. Check the label on foods and aim for 100% of the RDA (recommended daily allowance).  Lifestyle  Exercise at least 150 minutes each week   (30 minutes a day, 5 days a week).  Do muscle strengthening activities 2 days a week. These help control your weight and prevent disease.  No smoking.  Wear sunscreen to prevent skin cancer.  Have a dental exam and cleaning every 6 months.  Yearly exams  See your health care team every year to talk about:  Any changes in your health.  Any medicines your care team has prescribed.  Preventive care, family planning, and ways to prevent chronic diseases.  Shots (vaccines)   HPV shots (up to age 26), if you've never had them before.  Hepatitis B shots (up to age 59), if you've never had them before.  COVID-19 shot: Get this shot when it's due.  Flu shot: Get a flu shot every year.  Tetanus shot: Get a tetanus shot every 10 years.  Pneumococcal, hepatitis A, and RSV shots: Ask your care team if you need these based on your risk.  Shingles shot (for age 50 and up).  General health tests  Diabetes screening:  Starting at age 35, Get screened for diabetes at least every 3 years.  If you are younger than age 35, ask your care team if you should be screened for diabetes.  Cholesterol test: At age 39, start having a cholesterol test every 5 years, or more often if advised.  Bone density scan (DEXA): At age 50, ask your care team if you should have this scan for osteoporosis (brittle bones).  Hepatitis C: Get tested at least once in your life.  STIs (sexually transmitted  infections)  Before age 24: Ask your care team if you should be screened for STIs.  After age 24: Get screened for STIs if you're at risk. You are at risk for STIs (including HIV) if:  You are sexually active with more than one person.  You don't use condoms every time.  You or a partner was diagnosed with a sexually transmitted infection.  If you are at risk for HIV, ask about PrEP medicine to prevent HIV.  Get tested for HIV at least once in your life, whether you are at risk for HIV or not.  Cancer screening tests  Cervical cancer screening: If you have a cervix, begin getting regular cervical cancer screening tests at age 21. Most people who have regular screenings with normal results can stop after age 65. Talk about this with your provider.  Breast cancer scan (mammogram): If you've ever had breasts, begin having regular mammograms starting at age 40. This is a scan to check for breast cancer.  Colon cancer screening: It is important to start screening for colon cancer at age 45.  Have a colonoscopy test every 10 years (or more often if you're at risk) Or, ask your provider about stool tests like a FIT test every year or Cologuard test every 3 years.  To learn more about your testing options, visit: https://www.Prairie Cloudware/248122.pdf.  For help making a decision, visit: https://bit.ly/nm71437.  Prostate cancer screening test: If you have a prostate and are age 55 to 69, ask your provider if you would benefit from a yearly prostate cancer screening test.  Lung cancer screening: If you are a current or former smoker age 50 to 80, ask your care team if ongoing lung cancer screenings are right for you.  For informational purposes only. Not to replace the advice of your health care provider. Copyright   2023 [employer]. All rights reserved. Clinically reviewed by the [facility] Transitions Program. Moglue 431588 - REV 01/24.    Preventing Falls: Care Instructions  Injuries and health  problems such as trouble walking or poor eyesight can increase your risk of falling. So can some medicines. But there are things you can do to help prevent falls. You can exercise to get stronger. You can also arrange your home to make it safer.    Talk to your doctor about the medicines you take. Ask if any of them increase the risk of falls and whether they can be changed or stopped.   Try to exercise regularly. It can help improve your strength and balance. This can help lower your risk of falling.     Practice fall safety and prevention.    Wear low-heeled shoes that fit well and give your feet good support. Talk to your doctor if you have foot problems that make this hard.  Carry a cellphone or wear a medical alert device that you can use to call for help.  Use stepladders instead of chairs to reach high objects. Don't climb if you're at risk for falls. Ask for help, if needed.  Wear the correct eyeglasses, if you need them.    Make your home safer.    Remove rugs, cords, clutter, and furniture from walkways.  Keep your house well lit. Use night-lights in hallways and bathrooms.  Install and use sturdy handrails on stairways.  Wear nonskid footwear, even inside. Don't walk barefoot or in socks without shoes.    Be safe outside.    Use handrails, curb cuts, and ramps whenever possible.  Keep your hands free by using a shoulder bag or backpack.  Try to walk in well-lit areas. Watch out for uneven ground, changes in pavement, and debris.  Be careful in the winter. Walk on the grass or gravel when sidewalks are slippery. Use de-icer on steps and walkways. Add non-slip devices to shoes.    Put grab bars and nonskid mats in your shower or tub and near the toilet. Try to use a shower chair or bath bench when bathing.   Get into a tub or shower by putting in your weaker leg first. Get out with your strong side first. Have a phone or medical alert device in the bathroom with you.   Where can you learn more?  Go to  "https://www.Eyefreight.net/patiented  Enter G117 in the search box to learn more about \"Preventing Falls: Care Instructions.\"  Current as of: July 17, 2023               Content Version: 14.0    2112-9841 [employer].   Care instructions adapted under license by your healthcare professional. If you have questions about a medical condition or this instruction, always ask your healthcare professional. [employer] disclaims any warranty or liability for your use of this information.      Learning About Stress  What is stress?     Stress is your body's response to a hard situation. Your body can have a physical, emotional, or mental response. Stress is a fact of life for most people, and it affects everyone differently. What causes stress for you may not be stressful for someone else.  A lot of things can cause stress. You may feel stress when you go on a job interview, take a test, or run a race. This kind of short-term stress is normal and even useful. It can help you if you need to work hard or react quickly. For example, stress can help you finish an important job on time.  Long-term stress is caused by ongoing stressful situations or events. Examples of long-term stress include long-term health problems, ongoing problems at work, or conflicts in your family. Long-term stress can harm your health.  How does stress affect your health?  When you are stressed, your body responds as though you are in danger. It makes hormones that speed up your heart, make you breathe faster, and give you a burst of energy. This is called the fight-or-flight stress response. If the stress is over quickly, your body goes back to normal and no harm is done.  But if stress happens too often or lasts too long, it can have bad effects. Long-term stress can make you more likely to get sick, and it can make symptoms of some diseases worse. If you tense up when you are stressed, you may develop neck, shoulder, or low " back pain. Stress is linked to high blood pressure and heart disease.  Stress also harms your emotional health. It can make you ayon, tense, or depressed. Your relationships may suffer, and you may not do well at work or school.  What can you do to manage stress?  You can try these things to help manage stress:   Do something active. Exercise or activity can help reduce stress. Walking is a great way to get started. Even everyday activities such as housecleaning or yard work can help.  Try yoga or julio chi. These techniques combine exercise and meditation. You may need some training at first to learn them.  Do something you enjoy. For example, listen to music or go to a movie. Practice your hobby or do volunteer work.  Meditate. This can help you relax, because you are not worrying about what happened before or what may happen in the future.  Do guided imagery. Imagine yourself in any setting that helps you feel calm. You can use online videos, books, or a teacher to guide you.  Do breathing exercises. For example:  From a standing position, bend forward from the waist with your knees slightly bent. Let your arms dangle close to the floor.  Breathe in slowly and deeply as you return to a standing position. Roll up slowly and lift your head last.  Hold your breath for just a few seconds in the standing position.  Breathe out slowly and bend forward from the waist.  Let your feelings out. Talk, laugh, cry, and express anger when you need to. Talking with supportive friends or family, a counselor, or a benitez leader about your feelings is a healthy way to relieve stress. Avoid discussing your feelings with people who make you feel worse.  Write. It may help to write about things that are bothering you. This helps you find out how much stress you feel and what is causing it. When you know this, you can find better ways to cope.  What can you do to prevent stress?  You might try some of these things to help prevent  "stress:  Manage your time. This helps you find time to do the things you want and need to do.  Get enough sleep. Your body recovers from the stresses of the day while you are sleeping.  Get support. Your family, friends, and community can make a difference in how you experience stress.  Limit your news feed. Avoid or limit time on social media or news that may make you feel stressed.  Do something active. Exercise or activity can help reduce stress. Walking is a great way to get started.  Where can you learn more?  Go to https://www.CleanFish.net/patiented  Enter N032 in the search box to learn more about \"Learning About Stress.\"  Current as of: October 24, 2023               Content Version: 14.0    2950-6714 [employer].   Care instructions adapted under license by your healthcare professional. If you have questions about a medical condition or this instruction, always ask your healthcare professional. Healthwise, [employer] disclaims any warranty or liability for your use of this information.      "

## 2024-03-21 NOTE — PROGRESS NOTES
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 Increased estradiol patch to 0.1mg bi-weekly. Will start vaginal estrogen tablet as well. Encouraged mychart check in in 1-3 months.  - estradiol (VIVELLE-DOT) 0.1 MG/24HR bi-weekly patch  Dispense: 24 patch; Refill: 3  - estradiol (VAGIFEM) 10 MCG TABS vaginal tablet  Dispense: 24 tablet; Refill: 3    Mild major depression (H24)  Anxiety  Concentration deficit  ADHD (attention deficit hyperactivity disorder) evaluation  Tapered off lexapro ~1 week ago. Pt concerned about ADHD. Her kids have it. She has trouble concentrating, multiple racing thoughts. Will refer for ADHD evaluation.  - Adult Mental Health  Referral    Recurrent cold sores  Takes 1000mg valtrex daily. Uses an additional 500mg as needed with flares.  - valACYclovir (VALTREX) 1000 mg tablet  Dispense: 90 tablet; Refill: 3  - valACYclovir (VALTREX) 500 MG tablet  Dispense: 30 tablet; Refill: 3      BMI  Estimated body mass index is 25.54 kg/m  as calculated from the following:    Height as of this encounter: 1.651 m (5' 5\").    Weight as of this encounter: 69.6 kg (153 lb 8 oz).   Weight management plan: Discussed healthy diet and " exercise guidelines    Counseling  Appropriate preventive services were discussed with this patient, including applicable screening as appropriate for fall prevention, nutrition, physical activity, Tobacco-use cessation, weight loss and cognition.  Checklist reviewing preventive services available has been given to the patient.  Reviewed patient's diet, addressing concerns and/or questions.   She is at risk for psychosocial distress and has been provided with information to reduce risk.       Subjective   Dagoberto is a 53 year old, presenting for the following:  Physical (PT has a few health concerns she will like to discuss about )        3/21/2024     2:03 PM   Additional Questions   Roomed by Andrade Adams   Accompanied by Self        Health Care Directive  Patient does not have a Health Care Directive or Living Will: Discussed advance care planning with patient; however, patient declined at this time.    HPI    Work-  Diet-  Exercise-    Pap/hpv-due 9/2024 (h/o total hysterectomy though)  -due to hx SHAWN 2.3 on colpo in 2022, recommend 3 yr co-testing annual pap/HPV before entering every 3 yr tests for 25yrs    Mammo-DUE  Colon cancer screening-DUE    MDD  Anxiety  -lexapro 10mg (off x 1 week)  -still having concentration issues  -kids have ADHD, concerned she has ADHD  -constantly moving    Cold sores  -valtrex    Restless legs    HLD  -lipitor 20mg    Menopause  -estradiol patch     Refill lipitor once labs are back          3/21/2024   General Health   How would you rate your overall physical health? Good   Feel stress (tense, anxious, or unable to sleep) Only a little   (!) STRESS CONCERN      3/21/2024   Nutrition   Three or more servings of calcium each day? (!) NO   Diet: Regular (no restrictions)   How many servings of fruit and vegetables per day? (!) 0-1   How many sweetened beverages each day? (!) 2         3/21/2024   Exercise   Days per week of moderate/strenous exercise Patient declined   Average minutes  spent exercising at this level Patient declined         3/21/2024   Social Factors   Frequency of gathering with friends or relatives More than three times a week   Worry food won't last until get money to buy more No   Food not last or not have enough money for food? No   Do you have housing?  No   Are you worried about losing your housing? No   Lack of transportation? No   Unable to get utilities (heat,electricity)? Yes   Want help with housing or utility concern? No   (!) HOUSING CONCERN PRESENT(!) FINANCIAL RESOURCE STRAIN CONCERN      3/21/2024   Dental   Dentist two times every year? (!) DECLINE         3/21/2024   TB Screening   Were you born outside of the [place]? No       Today's PHQ-9 Score:       3/21/2024     1:49 PM   PHQ-9 SCORE   PHQ-9 Total Score MyChart 0   PHQ-9 Total Score 0         3/21/2024   Substance Use   Alcohol more than 3/day or more than 7/wk No   Do you use any other substances recreationally? No     Social History     Tobacco Use    Smoking status: Never    Smokeless tobacco: Never   Vaping Use    Vaping Use: Never used   Substance Use Topics    Alcohol use: Not Currently     Comment: rare    Drug use: Never           3/21/2024   Breast Cancer Screening   Family history of breast, colon, or ovarian cancer? No / Unknown         11/16/2021   LAST FHS-7 RESULTS   1st degree relative breast or ovarian cancer No   Any relative bilateral breast cancer No   Any male have breast cancer No   Any ONE woman have BOTH breast AND ovarian cancer No   Any woman with breast cancer before 50yrs No   2 or more relatives with breast AND/OR ovarian cancer No   2 or more relatives with breast AND/OR bowel cancer No           3/21/2024   STI Screening   New sexual partner(s) since last STI/HIV test? No     History of abnormal Pap smear:         Latest Ref Rng & Units 9/6/2023     9:46 AM 10/8/2021     2:51 PM 9/23/2020    10:30 AM   PAP / HPV   PAP  Negative for Intraepithelial Lesion or Malignancy (NILM)   "Negative for Intraepithelial Lesion or Malignancy (NILM)     HPV 16 DNA Negative Negative  Negative  Negative    HPV 18 DNA Negative Negative  Negative  Negative    Other HR HPV Negative Negative  Positive  Positive      ASCVD Risk   The 10-year ASCVD risk score (Ronaldo BARAKAT, et al., 2019) is: 2%    Values used to calculate the score:      Age: 53 years      Sex: Female      Is Non- : No      Diabetic: No      Tobacco smoker: No      Systolic Blood Pressure: 129 mmHg      Is BP treated: No      HDL Cholesterol: 47 mg/dL      Total Cholesterol: 184 mg/dL      Reviewed and updated as needed this visit by Provider   Tobacco   Meds  Problems  Med Hx  Surg Hx                Objective    Exam  /78 (BP Location: Right arm, Patient Position: Sitting, Cuff Size: Adult Regular)   Pulse 59   Temp 97.2  F (36.2  C) (Temporal)   Resp 16   Ht 1.651 m (5' 5\")   Wt 69.6 kg (153 lb 8 oz)   LMP  (LMP Unknown)   SpO2 100%   Breastfeeding No   BMI 25.54 kg/m     Estimated body mass index is 25.54 kg/m  as calculated from the following:    Height as of this encounter: 1.651 m (5' 5\").    Weight as of this encounter: 69.6 kg (153 lb 8 oz).    Physical Exam  Constitutional:       General: She is not in acute distress.     Appearance: She is well-developed.   HENT:      Head: Normocephalic and atraumatic.      Right Ear: Tympanic membrane, ear canal and external ear normal.      Left Ear: Tympanic membrane, ear canal and external ear normal.      Nose: Nose normal.      Mouth/Throat:      Mouth: Mucous membranes are moist.      Pharynx: No oropharyngeal exudate.   Eyes:      Extraocular Movements: Extraocular movements intact.      Conjunctiva/sclera: Conjunctivae normal.      Pupils: Pupils are equal, round, and reactive to light.   Cardiovascular:      Rate and Rhythm: Normal rate and regular rhythm.      Heart sounds: Normal heart sounds. No murmur heard.  Pulmonary:      Effort: " Pulmonary effort is normal.      Breath sounds: No wheezing or rales.   Abdominal:      General: Bowel sounds are normal.      Palpations: Abdomen is soft.      Tenderness: There is no abdominal tenderness.   Musculoskeletal:      Cervical back: Normal range of motion and neck supple. No rigidity.   Lymphadenopathy:      Cervical: No cervical adenopathy.   Skin:     General: Skin is warm and dry.      Findings: No rash.   Neurological:      General: No focal deficit present.      Mental Status: She is alert and oriented to person, place, and time.   Psychiatric:         Mood and Affect: Mood normal.         Behavior: Behavior normal.         Signed Electronically by: Michael Tejada DO

## 2024-03-22 LAB
CHOLEST SERPL-MCNC: 204 MG/DL
FASTING STATUS PATIENT QL REPORTED: ABNORMAL
HDLC SERPL-MCNC: 53 MG/DL
LDLC SERPL CALC-MCNC: 126 MG/DL
NONHDLC SERPL-MCNC: 151 MG/DL
TRIGL SERPL-MCNC: 125 MG/DL

## 2024-04-07 ENCOUNTER — MYC MEDICAL ADVICE (OUTPATIENT)
Dept: FAMILY MEDICINE | Facility: CLINIC | Age: 54
End: 2024-04-07
Payer: COMMERCIAL

## 2024-04-08 ENCOUNTER — NURSE TRIAGE (OUTPATIENT)
Dept: FAMILY MEDICINE | Facility: CLINIC | Age: 54
End: 2024-04-08
Payer: COMMERCIAL

## 2024-04-08 NOTE — TELEPHONE ENCOUNTER
"[facility]    \"The dose for my estrogen is going great. I m still getting the headache s and if I turn too fast I feel like I m gonna pass out. Is there something we can do about this? I also am wondering. I m having a hard time controlling my urine. Is this normal? \"    Writer called patient:  Left message to call back and ask to speak with an available triage nurse.    RHONA LawsonN, RN-BC  [facility]      "

## 2024-04-08 NOTE — TELEPHONE ENCOUNTER
Provider Response to 2nd Level Triage Request    I have reviewed the RN documentation. My recommendation is:  Dr. Tejada is out of office, recommend scheduling follow up with Dr. Tejada to discuss issues for this chronic problem.

## 2024-04-08 NOTE — TELEPHONE ENCOUNTER
Dr. Tejada,    Please see triage notes below and provide recommendation. Pt verb you know of headaches and urinary incontinence issues. Pt wanted to explore other modalities first and now is ready for your recommendations.    Nurse Triage SBAR    Is this a 2nd Level Triage? YES, LICENSED PRACTITIONER REVIEW IS REQUIRED    Situation: headache and urinary problem    Background:  Pt endorses constant dual throbbing headache behind ears going to the top of head x 1 1/2 mths and urinary incontinence x 6 to 8 mths.     Assessment:  Rates headache pain as 4-5/10 with light headed and dizziness when turn. Pt reports she has been experiencing urinary incontinence and hesitancy x 6 to 8 mths. Has had to use the bathroom more frequently. No pain or burning with urination.      Protocol Recommended Disposition:   See in Office Within 2 Weeks    Recommendation:  Do you want pt to be seen sooner?    Routed to provider    Does the patient meet one of the following criteria for ADS visit consideration? No     Nanci ROBERTS RN  [facility]      Reason for Disposition   Can't control passage of urine (i.e., urinary incontinence, wetting self) and present > 2 weeks   Headache is a chronic symptom (recurrent or ongoing AND lasting > 4 weeks)    Additional Information   Negative: Shock suspected (e.g., cold/pale/clammy skin, too weak to stand, low BP, rapid pulse)   Negative: Sounds like a life-threatening emergency to the triager   Negative: Followed a female genital area injury (e.g., labia, vagina, vulva)   Negative: Followed a male genital area injury (penis, scrotum)   Negative: Vaginal discharge   Negative: Pus (white, yellow) or bloody discharge from end of penis   Negative: Pain or burning with passing urine (urination) and pregnant   Negative: Pain or burning with passing urine (urination) and female   Negative: Pain or burning with passing urine (urination) and male   Negative: Pain or itching in the  vulvar area   Negative: Pain in scrotum is main symptom   Negative: Blood in the urine is main symptom   Negative: Symptoms arising from use of a urinary catheter (e.g., Coude, Dawson)   Negative: Unable to urinate (or only a few drops) > 4 hours and bladder feels very full (e.g., palpable bladder or strong urge to urinate)   Negative: Decreased urination and drinking very little and dehydration suspected (e.g., dark urine, no urine > 12 hours, very dry mouth, very lightheaded)   Negative: Patient sounds very sick or weak to the triager   Negative: Fever > 100.4 F  (38.0 C)   Negative: Side (flank) or lower back pain present   Negative: Bad or foul-smelling urine   Negative: Urinating more frequently than usual (i.e., frequency)   Negative: Can't control passage of urine (i.e., urinary incontinence) and new-onset (< 2 weeks) or worsening   Negative: Patient wants to be seen   Negative: Difficult to awaken or acting confused (e.g., disoriented, slurred speech)   Negative: Weakness of the face, arm or leg on one side of the body and new-onset   Negative: Numbness of the face, arm or leg on one side of the body and new-onset   Negative: Loss of speech or garbled speech and new-onset   Negative: Passed out (i.e., fainted, collapsed and was not responding)   Negative: Sounds like a life-threatening emergency to the triager   Negative: Followed a head injury within last 3 days   Negative: Traumatic Brain Injury (TBI) is suspected   Negative: Sinus pain of forehead and yellow or green nasal discharge   Negative: Pregnant   Negative: Unable to walk without falling   Negative: Stiff neck (can't touch chin to chest)   Negative: Possibility of carbon monoxide exposure   Negative: SEVERE headache, states 'worst headache' of life   Negative: SEVERE headache, sudden-onset (i.e., reaching maximum intensity within seconds to 1 hour)   Negative: Severe pain in one eye   Negative: Loss of vision or double vision  (Exception: Same as  "prior migraines.)   Negative: Patient sounds very sick or weak to the triager   Negative: Fever > 103 F (39.4 C)   Negative: Fever > 100.0 F (37.8 C) and has diabetes mellitus or a weak immune system (e.g., HIV positive, cancer chemotherapy, organ transplant, splenectomy, chronic steroids)   Negative: SEVERE headache (e.g., excruciating) and has had severe headaches before   Negative: SEVERE headache and not relieved by pain meds   Negative: SEVERE headache and vomiting   Negative: SEVERE headache and fever   Negative: New headache and weak immune system (e.g., HIV positive, cancer chemo, splenectomy, organ transplant, chronic steroids)   Negative: Fever present > 3 days (72 hours)   Negative: Patient wants to be seen   Negative: Unexplained headache that is present > 24 hours   Negative: New headache and age > 50   Negative: Headache started during exertion (e.g., sex, strenuous exercise, heavy lifting)    Answer Assessment - Initial Assessment Questions  1. LOCATION: \"Where does it hurt?\"       Right behind the ears going to the top of head.  If I turn, I get real light headed and dizzy.     2. ONSET: \"When did the headache start?\" (Minutes, hours or days)       I have the headaches for 1 1/2 mth     3. PATTERN: \"Does the pain come and go, or has it been constant since it started?\"      Constant, have tried all OTC's without relief.    4. SEVERITY: \"How bad is the pain?\" and \"What does it keep you from doing?\"  (e.g., Scale 1-10; mild, moderate, or severe)    - MILD (1-3): doesn't interfere with normal activities     - MODERATE (4-7): interferes with normal activities or awakens from sleep     - SEVERE (8-10): excruciating pain, unable to do any normal activities         The head ache pain is constant dual pain. Rate pain 4-5/10    5. RECURRENT SYMPTOM: \"Have you ever had headaches before?\" If Yes, ask: \"When was the last time?\" and \"What happened that time?\"       This is new.     6. CAUSE: \"What do you think is " "causing the headache?\"      I don't. I have had the headache before my doctor increased my estrogen level    7. MIGRAINE: \"Have you been diagnosed with migraine headaches?\" If Yes, ask: \"Is this headache similar?\"       Yes, but this headache is not as intense. It's more of a dual throbbing that just won't go away. I feel like there is wave like hearing in my ears.    8. HEAD INJURY: \"Has there been any recent injury to the head?\"       No    9. OTHER SYMPTOMS: \"Do you have any other symptoms?\" (fever, stiff neck, eye pain, sore throat, cold symptoms)      No    10. PREGNANCY: \"Is there any chance you are pregnant?\" \"When was your last menstrual period?\"        No    Answer Assessment - Initial Assessment Questions  1. SYMPTOM: \"What's the main symptom you're concerned about?\" (e.g., frequency, incontinence)      Incontinence, I can't hold my bladder, I have to use the bathroom more frequently.     2. ONSET: \"When did the  urine incontinence  start?\"      This has been going on for a while (6 to 8 mths)    3. PAIN: \"Is there any pain?\" If Yes, ask: \"How bad is it?\" (Scale: 1-10; mild, moderate, severe)      No pain    4. CAUSE: \"What do you think is causing the symptoms?\"      Not sure    5. OTHER SYMPTOMS: \"Do you have any other symptoms?\" (e.g., blood in urine, fever, flank pain, pain with urination)      None     6. PREGNANCY: \"Is there any chance you are pregnant?\" \"When was your last menstrual period?\"      No    Protocols used: Headache-A-OH, Urinary Symptoms-A-OH    "

## 2024-04-08 NOTE — TELEPHONE ENCOUNTER
"AIZZA Alex, , called patient:  \"Patient was unable to schedule as offered appts did not work for her. Works until 5pm. Did offer UC as she asked about Saturday access. In end she was advised we would need to wait for PCP as she said starting 4/18 she could make something work and PCP starts next vacation on that date.\"        RHONA LawsonN, RN-BC  [facility]    "

## 2024-04-10 DIAGNOSIS — E78.5 HYPERLIPIDEMIA LDL GOAL <130: ICD-10-CM

## 2024-04-10 RX ORDER — ATORVASTATIN CALCIUM 20 MG/1
20 TABLET, FILM COATED ORAL DAILY
Qty: 90 TABLET | Refills: 3 | Status: SHIPPED | OUTPATIENT
Start: 2024-04-10

## 2024-04-10 NOTE — TELEPHONE ENCOUNTER
RN attempted to contact patient, but no answer. Left message on patient's voice mail to call clinic back.    If patient calls back, please relay message below and ask what dates that patient is available. Thanks    Shivani Wynn RN  [facility]      What day can patient come in? Can you send me dates/times and I can try to accommodate this? We could do a virtual visit too.    Dr. Tejada

## 2024-04-10 NOTE — TELEPHONE ENCOUNTER
What day can patient come in? Can you send me dates/times and I can try to accommodate this? We could do a virtual visit too.    Dr. Tejada

## 2024-04-11 NOTE — TELEPHONE ENCOUNTER
Dr. Tejada --  Letting you know I scheduled this patient for Monday in a same day virtual phone appointment related to headaches and urinary issues (frequency and urgency).     RHONA PachecoN RN  [facility]

## 2024-04-15 ENCOUNTER — VIRTUAL VISIT (OUTPATIENT)
Dept: FAMILY MEDICINE | Facility: CLINIC | Age: 54
End: 2024-04-15
Payer: COMMERCIAL

## 2024-04-15 DIAGNOSIS — N95.1 SYMPTOMATIC MENOPAUSAL OR FEMALE CLIMACTERIC STATES: Primary | ICD-10-CM

## 2024-04-15 DIAGNOSIS — N39.41 URGE INCONTINENCE OF URINE: ICD-10-CM

## 2024-04-15 DIAGNOSIS — N39.3 FEMALE STRESS INCONTINENCE: ICD-10-CM

## 2024-04-15 DIAGNOSIS — R12 HEARTBURN: ICD-10-CM

## 2024-04-15 PROCEDURE — 99214 OFFICE O/P EST MOD 30 MIN: CPT | Mod: 93 | Performed by: STUDENT IN AN ORGANIZED HEALTH CARE EDUCATION/TRAINING PROGRAM

## 2024-04-15 RX ORDER — OXYBUTYNIN CHLORIDE 5 MG/1
5 TABLET, EXTENDED RELEASE ORAL DAILY
Qty: 90 TABLET | Refills: 1 | Status: SHIPPED | OUTPATIENT
Start: 2024-04-15 | End: 2024-10-04

## 2024-04-15 RX ORDER — ESTRADIOL 0.05 MG/D
2 PATCH, EXTENDED RELEASE TRANSDERMAL
Qty: 16 PATCH | Refills: 2 | Status: SHIPPED | OUTPATIENT
Start: 2024-04-15

## 2024-04-15 ASSESSMENT — ENCOUNTER SYMPTOMS: HEADACHES: 1

## 2024-04-15 NOTE — PATIENT INSTRUCTIONS
The leakage you have with coughing/sneezing is stress incontinence. The vaginal estrogen may help with this. Give it more time to work.    You also have an overactive bladder, that is causing the urgency sensation. Please start oxybutynin 5mg daily. Give it 4-12 weeks to see if it is working or not. Rare side effects include: dry mouth, fast heartbeat, dizziness, headache, confusion, constipation. Let me know if you are having intolerable side effects.    Please schedule a follow up visit in 3 months. If no improvement, we can refer you to a urologist, see pelvic floor PT and/or try a new medication    Dr. Tejada

## 2024-04-15 NOTE — PROGRESS NOTES
Dagoberto is a 53 year old who is being evaluated via a billable telephone visit.    What phone number would you like to be contacted at? [phone]  How would you like to obtain your AVS? Sae  Originating Location (pt. Location): Other Work   Distant Location (provider location):  On-site    Assessment & Plan     Heartburn  - omeprazole (PRILOSEC) 20 MG DR capsule  Dispense: 90 capsule; Refill: 3    Symptomatic menopausal or female climacteric states  Estradiol 1mg patches are out of stock. Will send 0.05mg patches instead-can use 2 at a time.  - estradiol (VIVELLE-DOT) 0.05 MG/24HR bi-weekly patch  Dispense: 16 patch; Refill: 2    Urge incontinence of urine  Symptoms consistent with stress and urge incontinence. Started vaginal estrogen march 2024-should give this at least 3 months to see if it is helping with stress incontinence. For urge incontinence, elected to start medication. Start oxybutynin ER 5mg daily. Counseled on side effects. Follow up in 3 months. If no improvement, consider urology referral, pelvic floor PT and/or switching medications.  - oxyBUTYnin ER (DITROPAN XL) 5 MG 24 hr tablet  Dispense: 90 tablet; Refill: 1      Subjective   Dagoberto is a 53 year old, presenting for the following health issues:  Headache and Urinary Problem        4/15/2024     9:27 AM   Additional Questions   Roomed by Birgit   Accompanied by Self         4/15/2024     9:27 AM   Patient Reported Additional Medications   Patient reports taking the following new medications None     History of Present Illness       Reason for visit:  Urine control and headache    She eats 0-1 servings of fruits and vegetables daily.She consumes 2 sweetened beverage(s) daily.She exercises with enough effort to increase her heart rate 10 to 19 minutes per day.  She exercises with enough effort to increase her heart rate 5 days per week.   She is taking medications regularly.     Urination problems  Urgency  Leakage, can't hold it in  6-8  months  Some stress incontinence        Objective         Vitals:  No vitals were obtained today due to virtual visit.    Physical Exam   General: Alert and no distress //Respiratory: No audible wheeze, cough, or shortness of breath // Psychiatric:  Appropriate affect, tone, and pace of words        Phone call duration: 10 minutes  Signed Electronically by: Michael Tejada DO

## 2024-05-14 ENCOUNTER — PATIENT OUTREACH (OUTPATIENT)
Dept: CARE COORDINATION | Facility: CLINIC | Age: 54
End: 2024-05-14
Payer: COMMERCIAL

## 2024-05-19 ENCOUNTER — MYC REFILL (OUTPATIENT)
Dept: FAMILY MEDICINE | Facility: CLINIC | Age: 54
End: 2024-05-19
Payer: COMMERCIAL

## 2024-05-19 DIAGNOSIS — G25.81 RESTLESS LEG SYNDROME: ICD-10-CM

## 2024-05-20 RX ORDER — CYCLOBENZAPRINE HCL 10 MG
10 TABLET ORAL 3 TIMES DAILY PRN
Qty: 30 TABLET | Refills: 0 | Status: SHIPPED | OUTPATIENT
Start: 2024-05-20 | End: 2024-09-20

## 2024-06-17 ENCOUNTER — MYC MEDICAL ADVICE (OUTPATIENT)
Dept: FAMILY MEDICINE | Facility: CLINIC | Age: 54
End: 2024-06-17
Payer: COMMERCIAL

## 2024-06-18 NOTE — TELEPHONE ENCOUNTER
Advised patient to complete eVisit via [employer].     Anni Mattson RN BSN  [facility]

## 2024-08-16 ENCOUNTER — PATIENT OUTREACH (OUTPATIENT)
Dept: FAMILY MEDICINE | Facility: CLINIC | Age: 54
End: 2024-08-16
Payer: COMMERCIAL

## 2024-08-16 DIAGNOSIS — R87.810 CERVICAL HIGH RISK HPV (HUMAN PAPILLOMAVIRUS) TEST POSITIVE: Primary | ICD-10-CM

## 2024-09-12 ENCOUNTER — MYC MEDICAL ADVICE (OUTPATIENT)
Dept: FAMILY MEDICINE | Facility: CLINIC | Age: 54
End: 2024-09-12
Payer: COMMERCIAL

## 2024-09-20 DIAGNOSIS — G25.81 RESTLESS LEG SYNDROME: ICD-10-CM

## 2024-09-20 RX ORDER — CYCLOBENZAPRINE HCL 10 MG
10 TABLET ORAL 3 TIMES DAILY PRN
Qty: 30 TABLET | Refills: 0 | Status: SHIPPED | OUTPATIENT
Start: 2024-09-20

## 2024-10-04 ENCOUNTER — MYC REFILL (OUTPATIENT)
Dept: FAMILY MEDICINE | Facility: CLINIC | Age: 54
End: 2024-10-04
Payer: COMMERCIAL

## 2024-10-04 DIAGNOSIS — R12 HEARTBURN: ICD-10-CM

## 2024-10-04 DIAGNOSIS — N39.41 URGE INCONTINENCE OF URINE: ICD-10-CM

## 2024-10-04 RX ORDER — OXYBUTYNIN CHLORIDE 5 MG/1
5 TABLET, EXTENDED RELEASE ORAL DAILY
Qty: 90 TABLET | Refills: 0 | Status: SHIPPED | OUTPATIENT
Start: 2024-10-04 | End: 2024-10-31

## 2024-10-09 ENCOUNTER — MYC REFILL (OUTPATIENT)
Dept: FAMILY MEDICINE | Facility: CLINIC | Age: 54
End: 2024-10-09
Payer: COMMERCIAL

## 2024-10-09 DIAGNOSIS — R12 HEARTBURN: ICD-10-CM

## 2024-10-26 SDOH — HEALTH STABILITY: PHYSICAL HEALTH: ON AVERAGE, HOW MANY MINUTES DO YOU ENGAGE IN EXERCISE AT THIS LEVEL?: 10 MIN

## 2024-10-26 SDOH — HEALTH STABILITY: PHYSICAL HEALTH: ON AVERAGE, HOW MANY DAYS PER WEEK DO YOU ENGAGE IN MODERATE TO STRENUOUS EXERCISE (LIKE A BRISK WALK)?: 3 DAYS

## 2024-10-26 ASSESSMENT — SOCIAL DETERMINANTS OF HEALTH (SDOH): HOW OFTEN DO YOU GET TOGETHER WITH FRIENDS OR RELATIVES?: MORE THAN THREE TIMES A WEEK

## 2024-10-30 NOTE — PATIENT INSTRUCTIONS
Patient Education       Thank you for coming to see me today! Here are a couple of pieces of information about my schedule and communication practices.     I am not in the clinic on Tuesdays. Non-urgent calls and messages received on Tuesdays will be addressed as soon as I am able when I am back in the office on the next business day. Urgent calls will be addressed by a covering clinician.       If lab work was done today as part of your evaluation you will generally be contacted via [employer], mail, or phone with the results within 7-10 days.  If there is an alarming/concerning result we will contact you by phone. Lab results come back at varying times, I generally wait until all labs are resulted before making comments on results. Please note, labs are automatically released to [employer] once available, but it may take a couple of days for my interpretation note to appear.      I try very hard to respond to medical messages with 2 business days of receiving them. Occasionally it takes me longer if I am trying to figure out the best way to respond and need to seek guidance, do some research or dig deeper into your medical history to come up with a helpful response.      If you need refills please contact your pharmacist. They will send a refill request to me to review. Please allow 3-5 business days for us to respond to all refill requests.      Please call or send a medical message with any questions or concerns. Thank you for trusting me to be part of your healthcare team!        Dr. Michael Tejada    Preventive Care Advice   This is general advice given by our system to help you stay healthy. However, your care team may have specific advice just for you. Please talk to your care team about your preventive care needs.  Nutrition  Eat 5 or more servings of fruits and vegetables each day.  Try wheat bread, brown rice and whole grain pasta (instead of white bread, rice, and pasta).  Get enough calcium and vitamin D. Check  the label on foods and aim for 100% of the RDA (recommended daily allowance).  Lifestyle  Exercise at least 150 minutes each week  (30 minutes a day, 5 days a week).  Do muscle strengthening activities 2 days a week. These help control your weight and prevent disease.  No smoking.  Wear sunscreen to prevent skin cancer.  Have a dental exam and cleaning every 6 months.  Yearly exams  See your health care team every year to talk about:  Any changes in your health.  Any medicines your care team has prescribed.  Preventive care, family planning, and ways to prevent chronic diseases.  Shots (vaccines)   HPV shots (up to age 26), if you've never had them before.  Hepatitis B shots (up to age 59), if you've never had them before.  COVID-19 shot: Get this shot when it's due.  Flu shot: Get a flu shot every year.  Tetanus shot: Get a tetanus shot every 10 years.  Pneumococcal, hepatitis A, and RSV shots: Ask your care team if you need these based on your risk.  Shingles shot (for age 50 and up)  General health tests  Diabetes screening:  Starting at age 35, Get screened for diabetes at least every 3 years.  If you are younger than age 35, ask your care team if you should be screened for diabetes.  Cholesterol test: At age 39, start having a cholesterol test every 5 years, or more often if advised.  Bone density scan (DEXA): At age 50, ask your care team if you should have this scan for osteoporosis (brittle bones).  Hepatitis C: Get tested at least once in your life.  STIs (sexually transmitted infections)  Before age 24: Ask your care team if you should be screened for STIs.  After age 24: Get screened for STIs if you're at risk. You are at risk for STIs (including HIV) if:  You are sexually active with more than one person.  You don't use condoms every time.  You or a partner was diagnosed with a sexually transmitted infection.  If you are at risk for HIV, ask about PrEP medicine to prevent HIV.  Get tested for HIV at  least once in your life, whether you are at risk for HIV or not.  Cancer screening tests  Cervical cancer screening: If you have a cervix, begin getting regular cervical cancer screening tests starting at age 21.  Breast cancer scan (mammogram): If you've ever had breasts, begin having regular mammograms starting at age 40. This is a scan to check for breast cancer.  Colon cancer screening: It is important to start screening for colon cancer at age 45.  Have a colonoscopy test every 10 years (or more often if you're at risk) Or, ask your provider about stool tests like a FIT test every year or Cologuard test every 3 years.  To learn more about your testing options, visit:   .  For help making a decision, visit:   https://bit.ly/sm61431.  Prostate cancer screening test: If you have a prostate, ask your care team if a prostate cancer screening test (PSA) at age 55 is right for you.  Lung cancer screening: If you are a current or former smoker ages 50 to 80, ask your care team if ongoing lung cancer screenings are right for you.  For informational purposes only. Not to replace the advice of your health care provider. Copyright   2023 [employer]. All rights reserved. Clinically reviewed by the [facility] Transitions Program. [employer] [id number] - REV 01/24.

## 2024-10-31 ENCOUNTER — ORDERS ONLY (AUTO-RELEASED) (OUTPATIENT)
Dept: FAMILY MEDICINE | Facility: CLINIC | Age: 54
End: 2024-10-31

## 2024-10-31 ENCOUNTER — OFFICE VISIT (OUTPATIENT)
Dept: FAMILY MEDICINE | Facility: CLINIC | Age: 54
End: 2024-10-31
Payer: COMMERCIAL

## 2024-10-31 VITALS
HEIGHT: 65 IN | BODY MASS INDEX: 28.21 KG/M2 | TEMPERATURE: 97.6 F | SYSTOLIC BLOOD PRESSURE: 124 MMHG | RESPIRATION RATE: 14 BRPM | WEIGHT: 169.3 LBS | DIASTOLIC BLOOD PRESSURE: 84 MMHG | OXYGEN SATURATION: 98 % | HEART RATE: 70 BPM

## 2024-10-31 DIAGNOSIS — B00.1 RECURRENT COLD SORES: ICD-10-CM

## 2024-10-31 DIAGNOSIS — N39.41 URGE INCONTINENCE OF URINE: ICD-10-CM

## 2024-10-31 DIAGNOSIS — Z12.11 SCREENING FOR COLON CANCER: ICD-10-CM

## 2024-10-31 DIAGNOSIS — Z00.00 ROUTINE GENERAL MEDICAL EXAMINATION AT A HEALTH CARE FACILITY: Primary | ICD-10-CM

## 2024-10-31 DIAGNOSIS — K22.2 SCHATZKI'S RING: ICD-10-CM

## 2024-10-31 DIAGNOSIS — Z13.220 SCREENING FOR LIPID DISORDERS: ICD-10-CM

## 2024-10-31 DIAGNOSIS — N95.1 SYMPTOMATIC MENOPAUSAL OR FEMALE CLIMACTERIC STATES: ICD-10-CM

## 2024-10-31 DIAGNOSIS — Z12.31 ENCOUNTER FOR SCREENING MAMMOGRAM FOR MALIGNANT NEOPLASM OF BREAST: ICD-10-CM

## 2024-10-31 DIAGNOSIS — E78.5 HYPERLIPIDEMIA LDL GOAL <130: ICD-10-CM

## 2024-10-31 DIAGNOSIS — Z12.4 CERVICAL CANCER SCREENING: ICD-10-CM

## 2024-10-31 DIAGNOSIS — N95.1 VAGINAL DRYNESS, MENOPAUSAL: ICD-10-CM

## 2024-10-31 DIAGNOSIS — R12 HEARTBURN: ICD-10-CM

## 2024-10-31 PROCEDURE — 99214 OFFICE O/P EST MOD 30 MIN: CPT | Mod: 25 | Performed by: STUDENT IN AN ORGANIZED HEALTH CARE EDUCATION/TRAINING PROGRAM

## 2024-10-31 PROCEDURE — 99396 PREV VISIT EST AGE 40-64: CPT | Mod: 25 | Performed by: STUDENT IN AN ORGANIZED HEALTH CARE EDUCATION/TRAINING PROGRAM

## 2024-10-31 PROCEDURE — 87624 HPV HI-RISK TYP POOLED RSLT: CPT | Performed by: STUDENT IN AN ORGANIZED HEALTH CARE EDUCATION/TRAINING PROGRAM

## 2024-10-31 PROCEDURE — 36415 COLL VENOUS BLD VENIPUNCTURE: CPT | Performed by: STUDENT IN AN ORGANIZED HEALTH CARE EDUCATION/TRAINING PROGRAM

## 2024-10-31 PROCEDURE — G0145 SCR C/V CYTO,THINLAYER,RESCR: HCPCS | Performed by: STUDENT IN AN ORGANIZED HEALTH CARE EDUCATION/TRAINING PROGRAM

## 2024-10-31 PROCEDURE — 80061 LIPID PANEL: CPT | Performed by: STUDENT IN AN ORGANIZED HEALTH CARE EDUCATION/TRAINING PROGRAM

## 2024-10-31 RX ORDER — ESTRADIOL 10 UG/1
10 INSERT VAGINAL
Qty: 24 TABLET | Refills: 4 | Status: SHIPPED | OUTPATIENT
Start: 2024-10-31

## 2024-10-31 RX ORDER — ATORVASTATIN CALCIUM 20 MG/1
20 TABLET, FILM COATED ORAL DAILY
Qty: 90 TABLET | Refills: 4 | Status: SHIPPED | OUTPATIENT
Start: 2024-10-31

## 2024-10-31 RX ORDER — OXYBUTYNIN CHLORIDE 5 MG/1
5 TABLET, EXTENDED RELEASE ORAL DAILY
Qty: 90 TABLET | Refills: 4 | Status: SHIPPED | OUTPATIENT
Start: 2024-10-31

## 2024-10-31 RX ORDER — VALACYCLOVIR HYDROCHLORIDE 1 G/1
1000 TABLET, FILM COATED ORAL DAILY
Qty: 90 TABLET | Refills: 4 | Status: SHIPPED | OUTPATIENT
Start: 2024-10-31

## 2024-10-31 RX ORDER — ESTRADIOL 0.1 MG/D
1 FILM, EXTENDED RELEASE TRANSDERMAL
Qty: 24 PATCH | Refills: 4 | Status: SHIPPED | OUTPATIENT
Start: 2024-10-31

## 2024-10-31 ASSESSMENT — PAIN SCALES - GENERAL: PAINLEVEL_OUTOF10: NO PAIN (0)

## 2024-10-31 ASSESSMENT — PATIENT HEALTH QUESTIONNAIRE - PHQ9
SUM OF ALL RESPONSES TO PHQ QUESTIONS 1-9: 0
SUM OF ALL RESPONSES TO PHQ QUESTIONS 1-9: 0

## 2024-10-31 NOTE — PROGRESS NOTES
"Preventive Care Visit  [facility]  Michael Tejada DO, Family Medicine  Oct 31, 2024      Assessment & Plan     Routine general medical examination at a health care facility  -Vitals: WNL  -Mammo: DUE, ordered  -Immunizations: declines  -Labs: lipids  -Colon Cancer screening: cologuard ordered    Cervical cancer screening  Hx CIN2/3 on colposcopy in 2022. S/p total hysterectomy in 2022. Recommend annual pap/hpv for 3 years (2023 through 2025) before we can enter long tern surveillance with 3 year co-testing for 25 years (through 2048).  - HPV and Gynecologic Cytology Panel - Recommended Age 30 - 65 Years    Screening for colon cancer  - COLOGUARD([employer])    Encounter for screening mammogram for malignant neoplasm of breast  - MA Screen Bilateral w/Benjamin    Schatzki's ring  Heartburn  Hx esophageal dilation, last was 2023. Having severe epigastric pain and heartburn. She is very limited in what foods she can eat. Will re-refer to GI-likely needs another dilation. Taking high dose PPI (60mg daily) with minimal relief.   - Adult GI  Referral - Consult Only  - omeprazole (PRILOSEC) 20 MG DR capsule  Dispense: 270 capsule; Refill: 1    Hyperlipidemia LDL goal <130  Screening for lipid disorders  - atorvastatin (LIPITOR) 20 MG tablet  Dispense: 90 tablet; Refill: 4  - Lipid panel    Symptomatic menopausal or female climacteric states  Doing well with estrogen patch. S/p hysterectomy so does not need progesterone. Vaginal estrogen is not helping with dryness. She is going to try \"uro\" vaginal probiotics. If no improvement, will do econsult with ob/gyn.  - estradiol (VIVELLE-DOT) 0.1 MG/24HR bi-weekly patch  Dispense: 24 patch; Refill: 4  - estradiol (VAGIFEM) 10 MCG TABS vaginal tablet  Dispense: 24 tablet; Refill: 4    Recurrent cold sores  Stable.  - valACYclovir (VALTREX) 1000 mg tablet  Dispense: 90 tablet; Refill: 4    Urge incontinence of urine  Improved. Stable on " "medication.  - oxyBUTYnin ER (DITROPAN XL) 5 MG 24 hr tablet  Dispense: 90 tablet; Refill: 4    BMI  Estimated body mass index is 28.09 kg/m  as calculated from the following:    Height as of this encounter: 1.654 m (5' 5.1\").    Weight as of this encounter: 76.8 kg (169 lb 4.8 oz).   Weight management plan: Discussed healthy diet and exercise guidelines    Counseling  Appropriate preventive services were addressed with this patient via screening, questionnaire, or discussion as appropriate for fall prevention, nutrition, physical activity, Tobacco-use cessation, social engagement, weight loss and cognition.  Checklist reviewing preventive services available has been given to the patient.  Reviewed patient's diet, addressing concerns and/or questions.   She is at risk for lack of exercise and has been provided with information to increase physical activity for the benefit of her well-being.   The patient was instructed to see the dentist every 6 months.         Subjective   Dagoberto is a 54 year old, presenting for the following:  Physical (Pap and cholesterol check)        10/31/2024     7:58 AM   Additional Questions   Roomed by Jennifer velásquez   Accompanied by self          HPI    Work-[employer]  Pap/hpv-DUE  Mammo-DUE  Colon cancer screening-DUE(has cologuard box at home, may be )    MDD, CRYSTAL  -not on meds    Cold sores  -valtrex    HLD  -lipitor    Incontinence  -vaginal estrogen  -oxybutynin  -estradiol patch (hx total hysterectomy)    Hx CIN2/3 on colposcopy   recommend 3 consecutive annual HPV-based tests before entering long-term surveillance of 3 yr co-testing for 25 yrs   -had hysterectomy in   -needs yearly paps in , ,  (then can enter every 3 yrs)    Hx esophagus stretching last 2 years ago  Having lots of epigastric pain, GERD, can't eat much      Health Care Directive  Patient does not have a Health Care Directive: Discussed advance care planning with patient; however, patient " declined at this time.      10/26/2024   General Health   How would you rate your overall physical health? Good   Feel stress (tense, anxious, or unable to sleep) Not at all            10/26/2024   Nutrition   Three or more servings of calcium each day? (!) NO   Diet: Regular (no restrictions)   How many servings of fruit and vegetables per day? (!) 0-1   How many sweetened beverages each day? (!) 2            10/26/2024   Exercise   Days per week of moderate/strenous exercise 3 days   Average minutes spent exercising at this level 10 min            10/26/2024   Social Factors   Frequency of gathering with friends or relatives More than three times a week   Worry food won't last until get money to buy more No   Food not last or not have enough money for food? No   Do you have housing? (Housing is defined as stable permanent housing and does not include staying ouside in a car, in a tent, in an abandoned building, in an overnight shelter, or couch-surfing.) Yes   Are you worried about losing your housing? No   Lack of transportation? No   Unable to get utilities (heat,electricity)? No            10/26/2024   Fall Risk   Fallen 2 or more times in the past year? No    Trouble with walking or balance? No        Patient-reported          10/26/2024   Dental   Dentist two times every year? (!) NO            3/21/2024   TB Screening   Were you born outside of the [place]? No        Today's PHQ-9 Score:       10/31/2024     7:45 AM   PHQ-9 SCORE   PHQ-9 Total Score MyChart 0   PHQ-9 Total Score 0        Patient-reported         10/26/2024   Substance Use   Alcohol more than 3/day or more than 7/wk Not Applicable   Do you use any other substances recreationally? No        Social History     Tobacco Use    Smoking status: Never     Passive exposure: Never    Smokeless tobacco: Never   Vaping Use    Vaping status: Never Used   Substance Use Topics    Alcohol use: Not Currently     Comment: rare    Drug use: Never            "11/16/2021   LAST FHS-7 RESULTS   1st degree relative breast or ovarian cancer No   Any relative bilateral breast cancer No   Any male have breast cancer No   Any ONE woman have BOTH breast AND ovarian cancer No   Any woman with breast cancer before 50yrs No   2 or more relatives with breast AND/OR ovarian cancer No   2 or more relatives with breast AND/OR bowel cancer No          10/26/2024   STI Screening   New sexual partner(s) since last STI/HIV test? No        History of abnormal Pap smear: YES - reflected in Problem List and Health Maintenance accordingly        Latest Ref Rng & Units 9/6/2023     9:46 AM 10/8/2021     2:51 PM 9/23/2020    10:30 AM   PAP / HPV   PAP  Negative for Intraepithelial Lesion or Malignancy (NILM)  Negative for Intraepithelial Lesion or Malignancy (NILM)     HPV 16 DNA Negative Negative  Negative  Negative    HPV 18 DNA Negative Negative  Negative  Negative    Other HR HPV Negative Negative  Positive  Positive      ASCVD Risk   The 10-year ASCVD risk score (Ronaldo BARAKAT, et al., 2019) is: 2%    Values used to calculate the score:      Age: 54 years      Sex: Female      Is Non- : No      Diabetic: No      Tobacco smoker: No      Systolic Blood Pressure: 124 mmHg      Is BP treated: No      HDL Cholesterol: 53 mg/dL      Total Cholesterol: 204 mg/dL    Reviewed and updated as needed this visit by Provider   Tobacco   Meds  Problems  Med Hx  Surg Hx  Fam Hx  Soc Hx Sexual   Activity             Objective    Exam  /84 (BP Location: Right arm, Patient Position: Sitting, Cuff Size: Adult Regular)   Pulse 70   Temp 97.6  F (36.4  C) (Temporal)   Resp 14   Ht 1.654 m (5' 5.1\")   Wt 76.8 kg (169 lb 4.8 oz)   LMP  (LMP Unknown)   SpO2 98%   BMI 28.09 kg/m     Estimated body mass index is 28.09 kg/m  as calculated from the following:    Height as of this encounter: 1.654 m (5' 5.1\").    Weight as of this encounter: 76.8 kg (169 lb 4.8 " oz).    Physical Exam  Constitutional:       General: She is not in acute distress.     Appearance: She is well-developed.   HENT:      Head: Normocephalic and atraumatic.      Right Ear: Tympanic membrane, ear canal and external ear normal.      Left Ear: Tympanic membrane, ear canal and external ear normal.      Nose: Nose normal.      Mouth/Throat:      Mouth: Mucous membranes are moist.      Pharynx: Oropharynx is clear. No oropharyngeal exudate.   Eyes:      Conjunctiva/sclera: Conjunctivae normal.      Pupils: Pupils are equal, round, and reactive to light.   Neck:      Thyroid: No thyroid mass, thyromegaly or thyroid tenderness.   Cardiovascular:      Rate and Rhythm: Normal rate and regular rhythm.      Heart sounds: Normal heart sounds. No murmur heard.  Pulmonary:      Effort: Pulmonary effort is normal.      Breath sounds: No wheezing or rales.   Chest:   Breasts:     Right: Normal.      Left: Normal.   Abdominal:      Tenderness: There is no abdominal tenderness.   Genitourinary:     General: Normal vulva.      Vagina: Normal.      Comments: No cervix  Vaginal dryness  Musculoskeletal:      Cervical back: Normal range of motion and neck supple. No rigidity or tenderness.   Lymphadenopathy:      Cervical: No cervical adenopathy.      Upper Body:      Right upper body: No supraclavicular, axillary or pectoral adenopathy.      Left upper body: No supraclavicular, axillary or pectoral adenopathy.   Skin:     General: Skin is warm and dry.      Findings: No rash.   Neurological:      General: No focal deficit present.      Mental Status: She is alert and oriented to person, place, and time. Mental status is at baseline.   Psychiatric:         Mood and Affect: Mood normal.         Behavior: Behavior normal.         Thought Content: Thought content normal.         Signed Electronically by: Michael Tejada DO    Answers submitted by the patient for this visit:  Patient Health Questionnaire (Submitted on  10/31/2024)  PHQ9 TOTAL SCORE: 0

## 2024-11-01 ENCOUNTER — PATIENT OUTREACH (OUTPATIENT)
Dept: CARE COORDINATION | Facility: CLINIC | Age: 54
End: 2024-11-01
Payer: COMMERCIAL

## 2024-11-01 LAB
CHOLEST SERPL-MCNC: 148 MG/DL
FASTING STATUS PATIENT QL REPORTED: YES
HDLC SERPL-MCNC: 49 MG/DL
LDLC SERPL CALC-MCNC: 80 MG/DL
NONHDLC SERPL-MCNC: 99 MG/DL
TRIGL SERPL-MCNC: 97 MG/DL

## 2024-11-04 LAB
HPV HR 12 DNA CVX QL NAA+PROBE: NEGATIVE
HPV16 DNA CVX QL NAA+PROBE: NEGATIVE
HPV18 DNA CVX QL NAA+PROBE: NEGATIVE
HUMAN PAPILLOMA VIRUS FINAL DIAGNOSIS: NORMAL

## 2024-11-05 LAB
BKR AP ASSOCIATED HPV REPORT: NORMAL
BKR LAB AP GYN ADEQUACY: NORMAL
BKR LAB AP GYN INTERPRETATION: NORMAL
BKR LAB AP PREVIOUS ABNL DX: NORMAL
BKR LAB AP PREVIOUS ABNORMAL: NORMAL
PATH REPORT.COMMENTS IMP SPEC: NORMAL
PATH REPORT.COMMENTS IMP SPEC: NORMAL
PATH REPORT.RELEVANT HX SPEC: NORMAL

## 2024-11-07 ENCOUNTER — PATIENT OUTREACH (OUTPATIENT)
Dept: FAMILY MEDICINE | Facility: CLINIC | Age: 54
End: 2024-11-07
Payer: COMMERCIAL

## 2024-11-07 DIAGNOSIS — R87.810 CERVICAL HIGH RISK HPV (HUMAN PAPILLOMAVIRUS) TEST POSITIVE: Primary | ICD-10-CM

## 2024-11-07 NOTE — TELEPHONE ENCOUNTER
10/31/24 NIL vaginal pap, Neg HPV. Plan 1 yr co-test    Due to CIN2-3 history,   -needs yearly pap/HPV from 2023 through 2025  -then can space out to pap/hpv every 3 years for 25 years (through 2048)

## 2024-11-19 ENCOUNTER — OFFICE VISIT (OUTPATIENT)
Dept: FAMILY MEDICINE | Facility: CLINIC | Age: 54
End: 2024-11-19
Payer: COMMERCIAL

## 2024-11-19 VITALS
HEIGHT: 65 IN | DIASTOLIC BLOOD PRESSURE: 72 MMHG | TEMPERATURE: 97.9 F | SYSTOLIC BLOOD PRESSURE: 124 MMHG | BODY MASS INDEX: 28.16 KG/M2 | OXYGEN SATURATION: 97 % | RESPIRATION RATE: 12 BRPM | WEIGHT: 169 LBS | HEART RATE: 64 BPM

## 2024-11-19 DIAGNOSIS — N30.00 ACUTE CYSTITIS WITHOUT HEMATURIA: Primary | ICD-10-CM

## 2024-11-19 DIAGNOSIS — N39.0 RECURRENT UTI: ICD-10-CM

## 2024-11-19 DIAGNOSIS — N89.8 VAGINAL DISCHARGE: ICD-10-CM

## 2024-11-19 LAB
BACTERIA #/AREA URNS HPF: ABNORMAL /HPF
CLUE CELLS: ABNORMAL
MUCOUS THREADS #/AREA URNS LPF: PRESENT /LPF
RBC #/AREA URNS AUTO: ABNORMAL /HPF
SQUAMOUS #/AREA URNS AUTO: ABNORMAL /LPF
TRICHOMONAS, WET PREP: ABNORMAL
WBC #/AREA URNS AUTO: ABNORMAL /HPF
WBC CLUMPS #/AREA URNS HPF: PRESENT /HPF
WBC'S/HIGH POWER FIELD, WET PREP: ABNORMAL
YEAST, WET PREP: ABNORMAL

## 2024-11-19 PROCEDURE — 87210 SMEAR WET MOUNT SALINE/INK: CPT

## 2024-11-19 PROCEDURE — 99214 OFFICE O/P EST MOD 30 MIN: CPT

## 2024-11-19 PROCEDURE — 81015 MICROSCOPIC EXAM OF URINE: CPT

## 2024-11-19 PROCEDURE — 87086 URINE CULTURE/COLONY COUNT: CPT

## 2024-11-19 RX ORDER — NITROFURANTOIN 25; 75 MG/1; MG/1
100 CAPSULE ORAL 2 TIMES DAILY
Qty: 10 CAPSULE | Refills: 0 | Status: SHIPPED | OUTPATIENT
Start: 2024-11-19 | End: 2024-11-24

## 2024-11-19 NOTE — PATIENT INSTRUCTIONS
Your symptoms and labs suggest a UTI. I have prescribed Macrobid to treat this. Please take this twice daily for 5 days. A urine culture is in process, and usually takes around 48 hours. We will call you if the results indicate we need to change your antibiotics.    Considering how often you have been having these concerns, we will also do a swab of your vagina to rule out concerns for bacterial vaginosis and yeast infection.  I am also placing a referral to urogynecology.  This is a specialty team that oftentimes has great input on management of more recurrent UTI symptoms and ongoing vaginal concerns.  Please call to schedule an appointment with them at your earliest convenience.    If you are having worsened symptoms, fever, abdominal pain, back pain, please go to ER right away.    Urinary Tract Infection (UTI) in Women: Care Instructions  Overview     A urinary tract infection, or UTI, is a general term for an infection anywhere between the kidneys and the urethra (where urine comes out). Most UTIs are bladder infections. They often cause pain or burning when you urinate.  UTIs are caused by bacteria and can be cured with antibiotics. Be sure to complete your treatment so that the infection does not get worse.  Follow-up care is a key part of your treatment and safety. Be sure to make and go to all appointments, and call your doctor if you are having problems. It's also a good idea to know your test results and keep a list of the medicines you take.  How can you care for yourself at home?  Take your antibiotics as directed. Do not stop taking them just because you feel better. You need to take the full course of antibiotics.  Drink extra water and other fluids for the next day or two. This will help make the urine less concentrated and help wash out the bacteria that are causing the infection. (If you have kidney, heart, or liver disease and have to limit fluids, talk with your doctor before you increase the  "amount of fluids you drink.)  Avoid drinks that are carbonated or have caffeine. They can irritate the bladder.  Urinate often. Try to empty your bladder each time.  To relieve pain, take a hot bath or lay a heating pad set on low over your lower belly or genital area. Never go to sleep with a heating pad in place.  To prevent UTIs  Drink plenty of water each day. This helps you urinate often, which clears bacteria from your system. (If you have kidney, heart, or liver disease and have to limit fluids, talk with your doctor before you increase the amount of fluids you drink.)  Urinate when you need to.  If you are sexually active, urinate right after you have sex.  Change sanitary pads often.  Avoid douches, bubble baths, feminine hygiene sprays, and other feminine hygiene products that have deodorants.  After going to the bathroom, wipe from front to back.  When should you call for help?   Call your doctor now or seek immediate medical care if:    Symptoms such as fever, chills, nausea, or vomiting get worse or appear for the first time.     You have new pain in your back just below your rib cage. This is called flank pain.     There is new blood or pus in your urine.     You have any problems with your antibiotic medicine.   Watch closely for changes in your health, and be sure to contact your doctor if:    You are not getting better after taking an antibiotic for 2 days.     Your symptoms go away but then come back.   Where can you learn more?  Go to https://www.[employer].net/patiented  Enter K848 in the search box to learn more about \"Urinary Tract Infection (UTI) in Women: Care Instructions.\"  Current as of: March 1, 2023               Content Version: 13.7    2680-1265 [employer].   Care instructions adapted under license by your healthcare professional. If you have questions about a medical condition or this instruction, always ask your healthcare professional. [employer] " disclaims any warranty or liability for your use of this information.

## 2024-11-19 NOTE — PROGRESS NOTES
Assessment & Plan     (N30.00) Acute cystitis without hematuria  (primary encounter diagnosis)  Comment: Acute and stable.  Vital signs stable, patient is not toxic appearing, in no findings that would warrant the need for immediate medical attention.  Not concern for pyelonephritis considering lack of flank pain, fever, hematuria, nausea, or vomiting.  Patient having classic clinical presentation of acute cystitis.  Urinalysis does indicate presence of bacteria, and reflex to culture is in process now.  Currently taking Azo, so this has impeded some of the macroscopic urinalysis results.  Considering classic presentation of urinary tract infection, and recurrent nature of UTIs, sending patient home with Macrobid twice daily for 5 days.  Will refer to urogynecology considering recurrent nature of concerns.  Did recommend the use of a cranberry supplement daily as prophylactic UTI management, and educated patient that this cannot completely promise that she would never have a UTI again, but may reduce the frequency of UTIs moving forward. Offered education on medications including appropriate dosing, possible side effects, and possible adverse effects.  Education given on return to clinic instructions as well as alarm signs that would require the need for immediate medical attention.  Patient attested to understanding.  Plan: UA Macroscopic with reflex to Microscopic and         Culture - Lab Collect, UA Microscopic with         Reflex to Culture, nitroFURantoin         macrocrystal-monohydrate (MACROBID) 100 MG         capsule, Adult Uro/Gyn  Referral,         Urine Culture    (N89.8) Vaginal discharge  Comment: Acute.  Patient is presenting with increased white clumpy discharge, and she does report what she feels is a foul smell in her vaginal area.  Physical examination reveals thin yellow discharge that has no odor.  Considering more recent frequent use of antibiotics, there is a possibility for  presence of yeast infection.  Patient does decline any vaginal irritation.  Will allow wet prep to guide next Epson the plan of care.  Plan: Wet prep - Clinic Collect    (N39.0) Recurrent UTI  Comment: Recurrent with acute episode.  Patient has had about 3 episodes of urinary tract infection in the last 3 months that have all been treated with antibiotics.  Unfortunately this is fairly poorly documented in her health record, as some of these were treated at urgent cares at outside facilities.  Regardless, will refer to urogynecology for further evaluation and management.  Patient is on a couple of hormone replacement therapies for postmenopausal status, so allowing urogynecology to guide ongoing recommendations and management for a number of her concerns.  Patient is very open to this referral.   Plan: Adult Uro/Gyn  Referral       This progress note has been dictated, with use of voice recognition software. Any grammatical, typographical, or context errors are unintentional and inherent to use of voice recognition software.     Ordering of each unique test  Prescription drug management  I spent a total of 20 minutes on the day of the visit.   Time spent by me today doing chart review, history and exam, documentation and further activities per the note      FUTURE APPOINTMENTS:       - Follow-up visit in 3 to 5 days if symptoms are not improving       - Make appointment with urogynecology specialist       - Follow-up for annual visit or as needed  Patient Instructions   Your symptoms and labs suggest a UTI. I have prescribed Macrobid to treat this. Please take this twice daily for 5 days. A urine culture is in process, and usually takes around 48 hours. We will call you if the results indicate we need to change your antibiotics.    Considering how often you have been having these concerns, we will also do a swab of your vagina to rule out concerns for bacterial vaginosis and yeast infection.  I am also  placing a referral to urogynecology.  This is a specialty team that oftentimes has great input on management of more recurrent UTI symptoms and ongoing vaginal concerns.  Please call to schedule an appointment with them at your earliest convenience.    If you are having worsened symptoms, fever, abdominal pain, back pain, please go to ER right away.    Urinary Tract Infection (UTI) in Women: Care Instructions  Overview     A urinary tract infection, or UTI, is a general term for an infection anywhere between the kidneys and the urethra (where urine comes out). Most UTIs are bladder infections. They often cause pain or burning when you urinate.  UTIs are caused by bacteria and can be cured with antibiotics. Be sure to complete your treatment so that the infection does not get worse.  Follow-up care is a key part of your treatment and safety. Be sure to make and go to all appointments, and call your doctor if you are having problems. It's also a good idea to know your test results and keep a list of the medicines you take.  How can you care for yourself at home?  Take your antibiotics as directed. Do not stop taking them just because you feel better. You need to take the full course of antibiotics.  Drink extra water and other fluids for the next day or two. This will help make the urine less concentrated and help wash out the bacteria that are causing the infection. (If you have kidney, heart, or liver disease and have to limit fluids, talk with your doctor before you increase the amount of fluids you drink.)  Avoid drinks that are carbonated or have caffeine. They can irritate the bladder.  Urinate often. Try to empty your bladder each time.  To relieve pain, take a hot bath or lay a heating pad set on low over your lower belly or genital area. Never go to sleep with a heating pad in place.  To prevent UTIs  Drink plenty of water each day. This helps you urinate often, which clears bacteria from your system. (If you  "have kidney, heart, or liver disease and have to limit fluids, talk with your doctor before you increase the amount of fluids you drink.)  Urinate when you need to.  If you are sexually active, urinate right after you have sex.  Change sanitary pads often.  Avoid douches, bubble baths, feminine hygiene sprays, and other feminine hygiene products that have deodorants.  After going to the bathroom, wipe from front to back.  When should you call for help?   Call your doctor now or seek immediate medical care if:    Symptoms such as fever, chills, nausea, or vomiting get worse or appear for the first time.     You have new pain in your back just below your rib cage. This is called flank pain.     There is new blood or pus in your urine.     You have any problems with your antibiotic medicine.   Watch closely for changes in your health, and be sure to contact your doctor if:    You are not getting better after taking an antibiotic for 2 days.     Your symptoms go away but then come back.   Where can you learn more?  Go to https://www.[employer].net/patiented  Enter K848 in the search box to learn more about \"Urinary Tract Infection (UTI) in Women: Care Instructions.\"  Current as of: March 1, 2023               Content Version: 13.7    [id number] [employer].   Care instructions adapted under license by your healthcare professional. If you have questions about a medical condition or this instruction, always ask your healthcare professional. [employer] disclaims any warranty or liability for your use of this information.            Subjective   Dagoberto is a 54 year old, presenting for the following health issues:  UTI (Sx's: severe dysuria, pelvic pressure and frequency; patient reports that she has been taking AZO )        11/19/2024     8:14 AM   Additional Questions   Roomed by Bianca   Accompanied by katt         11/19/2024     8:14 AM   Patient Reported Additional Medications   Patient reports " taking the following new medications OTC AZO for pain relief      History of Present Illness       Reason for visit:  Uti  Symptom onset:  1-3 days ago  Symptoms include:  Urge to pee. Burning pressure  Symptom intensity:  Severe  Symptom progression:  Staying the same  Had these symptoms before:  Yes  Has tried/received treatment for these symptoms:  Yes  Previous treatment was successful:  Yes  Prior treatment description:  Medication  What makes it worse:  No  What makes it better:  Medication   She is taking medications regularly.     Dagoberto is a 54-year-old female with a past medical history significant for depression, anxiety, cold sores, restless leg syndrome, status post total hysterectomy, elevated cholesterol levels, GERD, stress incontinence, urge incontinence, and Schatzki's ring who presents today with concerns for UTI symptoms.  Patient reports that she has been having recurrent and frequent UTIs over the past 3 months.  She notes that with these current symptoms, this is likely the third UTI in this short duration.  She declines anything specific that has changed, but does note that she has been having some increased hormone contribution in her plan of care including the use of the JANNET, Vagifem tablets, and estrogen patches.  The UTIs did not specifically start directly after these medications were started, but these medications are the newest contribution to her daily medication regimen, and she has a hard time thinking of anything else that might be causing her concerns.  She was most recently treated in urgent care with a 3-day course of antibiotics over a month ago.  She notes that her most recent symptoms started about 4 days ago, and have started with the classic presentation of her personal UTIs.  She has been having increased urinary frequency, urgency, bladder pressure, and severe dysuria.  She began taking Azo a couple of days ago, which has been helpful to manage the pain intermittently.   "She is unable to specifically attest to any blood in her urine, as the Azo of course changes her urine color, but she declines her urine being more of a red tint.  She declines any abdominal pain, nausea, vomiting, fever, or flank pain.  She does attest to some increased white thick vaginal discharge, but does also feel that her vagina seems malodorous to her.  She has begun taking a probiotic in hopes that this would help to balance some of the paulie in her gut and vagina.  She declines any itching, burning, or open sores or lesions in her vaginal area, and declines any concerns for sexually transmitted infection.  She feels completely well otherwise.  She notes that her estrogen replacement therapy is managed by her PCP.  She has not followed up with your gynecologist or gynecology in regards to these frequent urinary tract infections.      Review of Systems  Constitutional, HEENT, cardiovascular, pulmonary, gi and gu systems are negative, except as otherwise noted.      Objective    /72   Pulse 64   Temp 97.9  F (36.6  C) (Tympanic)   Resp 12   Ht 1.651 m (5' 5\")   Wt 76.7 kg (169 lb)   LMP  (LMP Unknown)   SpO2 97%   BMI 28.12 kg/m    Body mass index is 28.12 kg/m .  Physical Exam   GENERAL: alert and no distress  NECK: no adenopathy, no asymmetry, masses, or scars  RESP: lungs clear to auscultation - no rales, rhonchi or wheezes  CV: regular rate and rhythm, normal S1 S2, no S3 or S4, no murmur, click or rub, no peripheral edema  ABDOMEN: soft, nontender, no hepatosplenomegaly, no masses and bowel sounds normal   (female): normal female external genitalia, normal urethral meatus, normal vaginal mucosa.  Thin yellow vaginal discharge  MS: no gross musculoskeletal defects noted, no edema    Results for orders placed or performed in visit on 11/19/24 (from the past 24 hours)   UA Microscopic with Reflex to Culture   Result Value Ref Range    Bacteria Urine Few (A) None Seen /HPF    RBC Urine 0-2 " 0-2 /HPF /HPF    WBC Urine 10-25 (A) 0-5 /HPF /HPF    Squamous Epithelials Urine Moderate (A) None Seen /LPF    WBC Clumps Urine Present (A) None Seen /HPF    Mucus Urine Present (A) None Seen /LPF       Michelle Acuna DNP FNP-C  Family Nurse Practitioner - Same Day Provider  [facility] [place]    Signed Electronically by: PACHECO Dent CNP

## 2024-11-20 LAB — BACTERIA UR CULT: NORMAL

## 2024-11-25 ENCOUNTER — E-VISIT (OUTPATIENT)
Dept: URGENT CARE | Facility: CLINIC | Age: 54
End: 2024-11-25
Payer: COMMERCIAL

## 2024-11-25 DIAGNOSIS — J06.9 VIRAL URI WITH COUGH: Primary | ICD-10-CM

## 2024-11-25 RX ORDER — BENZONATATE 100 MG/1
100 CAPSULE ORAL 3 TIMES DAILY PRN
Qty: 30 CAPSULE | Refills: 0 | Status: SHIPPED | OUTPATIENT
Start: 2024-11-25

## 2024-11-25 NOTE — PATIENT INSTRUCTIONS
Bronchitis: Care Instructions  Overview       I sent a prescription cough medicine to your pharmacy.   Bronchitis is inflammation of the bronchial tubes, which carry air to the lungs. The tubes swell and produce mucus, or phlegm. The mucus and inflamed bronchial tubes make you cough. You may have trouble breathing.  Most cases of bronchitis are caused by viruses like those that cause colds. Antibiotics most often do not help and they may cause harm.  Bronchitis usually develops rapidly and lasts about 2 to 3 weeks in people who are otherwise healthy.  Follow-up care is a key part of your treatment and safety. Be sure to make and go to all appointments, and call your doctor if you are having problems. It's also a good idea to know your test results and keep a list of the medicines you take.  How can you care for yourself at home?  Take all medicines exactly as prescribed. Call your doctor if you think you are having a problem with your medicine.  Get some extra rest.  Take an over-the-counter pain medicine, such as acetaminophen (Tylenol), ibuprofen (Advil, Motrin), or naproxen (Aleve) to reduce fever and relieve body aches. Read and follow all instructions on the label.  Do not take two or more pain medicines at the same time unless the doctor told you to. Many pain medicines have acetaminophen, which is Tylenol. Too much acetaminophen (Tylenol) can be harmful.  Take an over-the-counter cough medicine to help quiet a dry, hacking cough so that you can sleep. Avoid cough medicines that have more than one active ingredient. Read and follow all instructions on the label.  Do not smoke. Smoking can make bronchitis worse. If you need help quitting, talk to your doctor about stop-smoking programs and medicines. These can increase your chances of quitting for good.  When should you call for help?   Call 911 anytime you think you may need emergency care. For example, call if:    You have severe trouble breathing.   Call  "your doctor now or seek immediate medical care if:    You have new or worse trouble breathing.     You cough up dark brown or bloody mucus (sputum).     You have a new or higher fever.     You have a new rash.   Watch closely for changes in your health, and be sure to contact your doctor if:    You cough more deeply or more often, especially if you notice more mucus or a change in the color of your mucus.     You are not getting better as expected.   Where can you learn more?  Go to https://www.[employer].net/patiented  Enter H333 in the search box to learn more about \"Bronchitis: Care Instructions.\"  Current as of: August 6, 2023  Content Version: 14.2 2024 [facility] [employer].   Care instructions adapted under license by your healthcare professional. If you have questions about a medical condition or this instruction, always ask your healthcare professional. [employer] disclaims any warranty or liability for your use of this information.    "

## 2024-12-02 ENCOUNTER — E-VISIT (OUTPATIENT)
Dept: URGENT CARE | Facility: CLINIC | Age: 54
End: 2024-12-02
Payer: COMMERCIAL

## 2024-12-02 DIAGNOSIS — R06.02 SOB (SHORTNESS OF BREATH): Primary | ICD-10-CM

## 2024-12-02 PROCEDURE — 99207 PR NON-BILLABLE SERV PER CHARTING: CPT | Performed by: FAMILY MEDICINE

## 2024-12-03 NOTE — PATIENT INSTRUCTIONS
Dear Sharron Shipley,    We are sorry you are not feeling well. Based on the responses you provided with cough and SOB, it is recommended that you be seen in-person in urgent care so we can better evaluate your symptoms. Please click here to find the nearest urgent care location to you.   You will not be charged for this Visit. Thank you for trusting us with your care.    PACHECO Price CNP

## 2025-01-15 NOTE — TELEPHONE ENCOUNTER
REFERRAL INFORMATION:  Referring Provider:  Jack Hua DO   Referring Clinic:  [facility]   Reason for Visit/Diagnosis: Text link to [phone] // Pura ring- Heartburn // Per pt // Ref: JACK HUA // Recs: No outside      FUTURE VISIT INFORMATION:  Appointment Date: 4/15/25     NOTES STATUS DETAILS   OFFICE NOTE from Referring Provider Internal 10/31/24 OV- Jack Hua DO    HOSPITAL DISCHARGE SUMMARY/  ED VISITS Internal    OPERATIVE REPORT Internal    MEDICATION LIST Internal    PROCEDURES     ENDOSCOPY  Internal 4/25/23 Upper GI Endoscopy  5/10/21 ESOPHAGOGASTRODUODENOSCOPY, WITH DILATION of lower esophageal sphincter    LABS     PERTINENT LABS Internal    IMAGES     CT Internal 3/1/23 CT abd pel   XRAY Internal 10/19/23 XR cervical

## 2025-02-05 ENCOUNTER — HOSPITAL ENCOUNTER (OUTPATIENT)
Facility: CLINIC | Age: 55
Setting detail: OBSERVATION
Discharge: HOME | End: 2025-02-06
Attending: EMERGENCY MEDICINE | Admitting: INTERNAL MEDICINE
Payer: COMMERCIAL

## 2025-02-05 DIAGNOSIS — I47.20 VENTRICULAR TACHYCARDIA (H): ICD-10-CM

## 2025-02-05 DIAGNOSIS — I48.91 ATRIAL FIBRILLATION WITH RAPID VENTRICULAR RESPONSE (H): ICD-10-CM

## 2025-02-05 DIAGNOSIS — R12 HEARTBURN: ICD-10-CM

## 2025-02-05 LAB
ALBUMIN SERPL BCG-MCNC: 4.4 G/DL (ref 3.5–5.2)
ALBUMIN UR-MCNC: NEGATIVE MG/DL
ALP SERPL-CCNC: 86 U/L (ref 40–150)
ALT SERPL W P-5'-P-CCNC: 14 U/L (ref 0–50)
ANION GAP SERPL CALCULATED.3IONS-SCNC: 12 MMOL/L (ref 7–15)
APPEARANCE UR: CLEAR
AST SERPL W P-5'-P-CCNC: 17 U/L (ref 0–45)
BASOPHILS # BLD AUTO: 0 10E3/UL (ref 0–0.2)
BASOPHILS NFR BLD AUTO: 0 %
BILIRUB SERPL-MCNC: 0.4 MG/DL
BILIRUB UR QL STRIP: NEGATIVE
BUN SERPL-MCNC: 9.6 MG/DL (ref 6–20)
CALCIUM SERPL-MCNC: 8.9 MG/DL (ref 8.8–10.4)
CHLORIDE SERPL-SCNC: 106 MMOL/L (ref 98–107)
COLOR UR AUTO: ABNORMAL
CREAT SERPL-MCNC: 0.83 MG/DL (ref 0.51–0.95)
EGFRCR SERPLBLD CKD-EPI 2021: 83 ML/MIN/1.73M2
EOSINOPHIL # BLD AUTO: 0.1 10E3/UL (ref 0–0.7)
EOSINOPHIL NFR BLD AUTO: 1 %
ERYTHROCYTE [DISTWIDTH] IN BLOOD BY AUTOMATED COUNT: 12.4 % (ref 10–15)
GLUCOSE SERPL-MCNC: 121 MG/DL (ref 70–99)
GLUCOSE UR STRIP-MCNC: NEGATIVE MG/DL
HCO3 SERPL-SCNC: 25 MMOL/L (ref 22–29)
HCT VFR BLD AUTO: 41.9 % (ref 35–47)
HGB BLD-MCNC: 14.7 G/DL (ref 11.7–15.7)
HGB UR QL STRIP: NEGATIVE
HOLD SPECIMEN: NORMAL
HOLD SPECIMEN: NORMAL
IMM GRANULOCYTES # BLD: 0 10E3/UL
IMM GRANULOCYTES NFR BLD: 0 %
KETONES UR STRIP-MCNC: NEGATIVE MG/DL
LEUKOCYTE ESTERASE UR QL STRIP: NEGATIVE
LYMPHOCYTES # BLD AUTO: 2.7 10E3/UL (ref 0.8–5.3)
LYMPHOCYTES NFR BLD AUTO: 37 %
MAGNESIUM SERPL-MCNC: 1.8 MG/DL (ref 1.7–2.3)
MCH RBC QN AUTO: 31.3 PG (ref 26.5–33)
MCHC RBC AUTO-ENTMCNC: 35.1 G/DL (ref 31.5–36.5)
MCV RBC AUTO: 89 FL (ref 78–100)
MONOCYTES # BLD AUTO: 0.4 10E3/UL (ref 0–1.3)
MONOCYTES NFR BLD AUTO: 6 %
NEUTROPHILS # BLD AUTO: 4 10E3/UL (ref 1.6–8.3)
NEUTROPHILS NFR BLD AUTO: 56 %
NITRATE UR QL: NEGATIVE
NRBC # BLD AUTO: 0 10E3/UL
NRBC BLD AUTO-RTO: 0 /100
PH UR STRIP: 7 [PH] (ref 5–7)
PLATELET # BLD AUTO: 269 10E3/UL (ref 150–450)
POTASSIUM SERPL-SCNC: 3.5 MMOL/L (ref 3.4–5.3)
PROT SERPL-MCNC: 7.1 G/DL (ref 6.4–8.3)
RBC # BLD AUTO: 4.69 10E6/UL (ref 3.8–5.2)
RBC URINE: 0 /HPF
SODIUM SERPL-SCNC: 143 MMOL/L (ref 135–145)
SP GR UR STRIP: 1 (ref 1–1.03)
SQUAMOUS EPITHELIAL: 2 /HPF
T4 FREE SERPL-MCNC: 1.12 NG/DL (ref 0.9–1.7)
TRANSITIONAL EPI: <1 /HPF
TROPONIN T SERPL HS-MCNC: <6 NG/L
TSH SERPL DL<=0.005 MIU/L-ACNC: 6.6 UIU/ML (ref 0.3–4.2)
UROBILINOGEN UR STRIP-MCNC: NORMAL MG/DL
WBC # BLD AUTO: 7.2 10E3/UL (ref 4–11)
WBC URINE: 1 /HPF

## 2025-02-05 PROCEDURE — 250N000013 HC RX MED GY IP 250 OP 250 PS 637

## 2025-02-05 PROCEDURE — 36415 COLL VENOUS BLD VENIPUNCTURE: CPT | Performed by: STUDENT IN AN ORGANIZED HEALTH CARE EDUCATION/TRAINING PROGRAM

## 2025-02-05 PROCEDURE — 250N000013 HC RX MED GY IP 250 OP 250 PS 637: Performed by: INTERNAL MEDICINE

## 2025-02-05 PROCEDURE — 93005 ELECTROCARDIOGRAM TRACING: CPT | Performed by: EMERGENCY MEDICINE

## 2025-02-05 PROCEDURE — 99291 CRITICAL CARE FIRST HOUR: CPT | Mod: FS | Performed by: EMERGENCY MEDICINE

## 2025-02-05 PROCEDURE — 250N000011 HC RX IP 250 OP 636: Performed by: EMERGENCY MEDICINE

## 2025-02-05 PROCEDURE — 96365 THER/PROPH/DIAG IV INF INIT: CPT

## 2025-02-05 PROCEDURE — 81001 URINALYSIS AUTO W/SCOPE: CPT | Performed by: EMERGENCY MEDICINE

## 2025-02-05 PROCEDURE — 258N000003 HC RX IP 258 OP 636: Performed by: STUDENT IN AN ORGANIZED HEALTH CARE EDUCATION/TRAINING PROGRAM

## 2025-02-05 PROCEDURE — 250N000009 HC RX 250

## 2025-02-05 PROCEDURE — 84443 ASSAY THYROID STIM HORMONE: CPT | Performed by: STUDENT IN AN ORGANIZED HEALTH CARE EDUCATION/TRAINING PROGRAM

## 2025-02-05 PROCEDURE — 96376 TX/PRO/DX INJ SAME DRUG ADON: CPT

## 2025-02-05 PROCEDURE — 258N000003 HC RX IP 258 OP 636

## 2025-02-05 PROCEDURE — 84439 ASSAY OF FREE THYROXINE: CPT | Performed by: STUDENT IN AN ORGANIZED HEALTH CARE EDUCATION/TRAINING PROGRAM

## 2025-02-05 PROCEDURE — 83735 ASSAY OF MAGNESIUM: CPT | Performed by: STUDENT IN AN ORGANIZED HEALTH CARE EDUCATION/TRAINING PROGRAM

## 2025-02-05 PROCEDURE — 258N000003 HC RX IP 258 OP 636: Performed by: EMERGENCY MEDICINE

## 2025-02-05 PROCEDURE — 85025 COMPLETE CBC W/AUTO DIFF WBC: CPT | Performed by: STUDENT IN AN ORGANIZED HEALTH CARE EDUCATION/TRAINING PROGRAM

## 2025-02-05 PROCEDURE — 93010 ELECTROCARDIOGRAM REPORT: CPT | Performed by: EMERGENCY MEDICINE

## 2025-02-05 PROCEDURE — 96366 THER/PROPH/DIAG IV INF ADDON: CPT

## 2025-02-05 PROCEDURE — 250N000009 HC RX 250: Performed by: EMERGENCY MEDICINE

## 2025-02-05 PROCEDURE — 84484 ASSAY OF TROPONIN QUANT: CPT | Performed by: STUDENT IN AN ORGANIZED HEALTH CARE EDUCATION/TRAINING PROGRAM

## 2025-02-05 PROCEDURE — 93005 ELECTROCARDIOGRAM TRACING: CPT | Mod: 76

## 2025-02-05 PROCEDURE — 99291 CRITICAL CARE FIRST HOUR: CPT | Mod: 25 | Performed by: EMERGENCY MEDICINE

## 2025-02-05 PROCEDURE — 84075 ASSAY ALKALINE PHOSPHATASE: CPT | Performed by: STUDENT IN AN ORGANIZED HEALTH CARE EDUCATION/TRAINING PROGRAM

## 2025-02-05 PROCEDURE — 120N000002 HC R&B MED SURG/OB UMMC

## 2025-02-05 RX ORDER — OMEPRAZOLE 20 MG/1
20 CAPSULE, DELAYED RELEASE ORAL DAILY PRN
COMMUNITY

## 2025-02-05 RX ORDER — OMEPRAZOLE 20 MG/1
40 CAPSULE, DELAYED RELEASE ORAL EVERY EVENING
Status: DISCONTINUED | OUTPATIENT
Start: 2025-02-05 | End: 2025-02-05

## 2025-02-05 RX ORDER — PANTOPRAZOLE SODIUM 40 MG/1
40 TABLET, DELAYED RELEASE ORAL EVERY EVENING
Status: DISCONTINUED | OUTPATIENT
Start: 2025-02-05 | End: 2025-02-06 | Stop reason: HOSPADM

## 2025-02-05 RX ORDER — DILTIAZEM HYDROCHLORIDE 5 MG/ML
10 INJECTION INTRAVENOUS ONCE
Status: COMPLETED | OUTPATIENT
Start: 2025-02-05 | End: 2025-02-05

## 2025-02-05 RX ORDER — CALCIUM CARBONATE 500 MG/1
1000 TABLET, CHEWABLE ORAL 4 TIMES DAILY PRN
Status: DISCONTINUED | OUTPATIENT
Start: 2025-02-05 | End: 2025-02-06 | Stop reason: HOSPADM

## 2025-02-05 RX ORDER — AMOXICILLIN 250 MG
2 CAPSULE ORAL 2 TIMES DAILY PRN
Status: DISCONTINUED | OUTPATIENT
Start: 2025-02-05 | End: 2025-02-06 | Stop reason: HOSPADM

## 2025-02-05 RX ORDER — ENOXAPARIN SODIUM 100 MG/ML
40 INJECTION SUBCUTANEOUS EVERY 24 HOURS
Status: DISCONTINUED | OUTPATIENT
Start: 2025-02-06 | End: 2025-02-06

## 2025-02-05 RX ORDER — ATORVASTATIN CALCIUM 20 MG/1
20 TABLET, FILM COATED ORAL DAILY
Status: DISCONTINUED | OUTPATIENT
Start: 2025-02-06 | End: 2025-02-06 | Stop reason: HOSPADM

## 2025-02-05 RX ORDER — AMOXICILLIN 250 MG
1 CAPSULE ORAL 2 TIMES DAILY PRN
Status: DISCONTINUED | OUTPATIENT
Start: 2025-02-05 | End: 2025-02-06 | Stop reason: HOSPADM

## 2025-02-05 RX ORDER — POTASSIUM CHLORIDE 750 MG/1
40 TABLET, EXTENDED RELEASE ORAL ONCE
Status: COMPLETED | OUTPATIENT
Start: 2025-02-05 | End: 2025-02-05

## 2025-02-05 RX ORDER — LIDOCAINE 40 MG/G
CREAM TOPICAL
Status: DISCONTINUED | OUTPATIENT
Start: 2025-02-05 | End: 2025-02-06 | Stop reason: HOSPADM

## 2025-02-05 RX ADMIN — POTASSIUM CHLORIDE 40 MEQ: 750 TABLET, EXTENDED RELEASE ORAL at 23:48

## 2025-02-05 RX ADMIN — PANTOPRAZOLE SODIUM 40 MG: 40 TABLET, DELAYED RELEASE ORAL at 23:48

## 2025-02-05 RX ADMIN — DILTIAZEM HYDROCHLORIDE 10 MG: 5 INJECTION INTRAVENOUS at 19:55

## 2025-02-05 RX ADMIN — SODIUM CHLORIDE 1000 ML: 9 INJECTION, SOLUTION INTRAVENOUS at 19:58

## 2025-02-05 RX ADMIN — DILTIAZEM HYDROCHLORIDE 10 MG/HR: 5 INJECTION, SOLUTION INTRAVENOUS at 21:54

## 2025-02-05 RX ADMIN — DILTIAZEM HYDROCHLORIDE 5 MG/HR: 5 INJECTION, SOLUTION INTRAVENOUS at 20:15

## 2025-02-05 ASSESSMENT — ACTIVITIES OF DAILY LIVING (ADL)
ADLS_ACUITY_SCORE: 41

## 2025-02-05 ASSESSMENT — COLUMBIA-SUICIDE SEVERITY RATING SCALE - C-SSRS
6. HAVE YOU EVER DONE ANYTHING, STARTED TO DO ANYTHING, OR PREPARED TO DO ANYTHING TO END YOUR LIFE?: NO
2. HAVE YOU ACTUALLY HAD ANY THOUGHTS OF KILLING YOURSELF IN THE PAST MONTH?: NO
1. IN THE PAST MONTH, HAVE YOU WISHED YOU WERE DEAD OR WISHED YOU COULD GO TO SLEEP AND NOT WAKE UP?: NO

## 2025-02-06 ENCOUNTER — ORDERS ONLY (AUTO-RELEASED) (OUTPATIENT)
Dept: EMERGENCY MEDICINE | Facility: CLINIC | Age: 55
End: 2025-02-06

## 2025-02-06 ENCOUNTER — APPOINTMENT (OUTPATIENT)
Dept: CARDIOLOGY | Facility: CLINIC | Age: 55
End: 2025-02-06
Attending: NURSE PRACTITIONER
Payer: COMMERCIAL

## 2025-02-06 VITALS
OXYGEN SATURATION: 99 % | HEIGHT: 65 IN | RESPIRATION RATE: 16 BRPM | TEMPERATURE: 97.7 F | HEART RATE: 82 BPM | SYSTOLIC BLOOD PRESSURE: 115 MMHG | WEIGHT: 165 LBS | BODY MASS INDEX: 27.49 KG/M2 | DIASTOLIC BLOOD PRESSURE: 73 MMHG

## 2025-02-06 DIAGNOSIS — I48.91 ATRIAL FIBRILLATION WITH RAPID VENTRICULAR RESPONSE (H): ICD-10-CM

## 2025-02-06 LAB
ANION GAP SERPL CALCULATED.3IONS-SCNC: 8 MMOL/L (ref 7–15)
ATRIAL RATE - MUSE: 101 BPM
ATRIAL RATE - MUSE: 167 BPM
ATRIAL RATE - MUSE: 77 BPM
BUN SERPL-MCNC: 9.1 MG/DL (ref 6–20)
CALCIUM SERPL-MCNC: 8.3 MG/DL (ref 8.8–10.4)
CHLORIDE SERPL-SCNC: 107 MMOL/L (ref 98–107)
CREAT SERPL-MCNC: 0.73 MG/DL (ref 0.51–0.95)
DIASTOLIC BLOOD PRESSURE - MUSE: NORMAL MMHG
EGFRCR SERPLBLD CKD-EPI 2021: >90 ML/MIN/1.73M2
GLUCOSE SERPL-MCNC: 109 MG/DL (ref 70–99)
HCO3 SERPL-SCNC: 24 MMOL/L (ref 22–29)
HOLD SPECIMEN: NORMAL
INTERPRETATION ECG - MUSE: NORMAL
LVEF ECHO: NORMAL
P AXIS - MUSE: 67 DEGREES
P AXIS - MUSE: 72 DEGREES
P AXIS - MUSE: NORMAL DEGREES
POTASSIUM SERPL-SCNC: 4.1 MMOL/L (ref 3.4–5.3)
PR INTERVAL - MUSE: 148 MS
PR INTERVAL - MUSE: 160 MS
PR INTERVAL - MUSE: NORMAL MS
QRS DURATION - MUSE: 74 MS
QRS DURATION - MUSE: 76 MS
QRS DURATION - MUSE: 78 MS
QT - MUSE: 224 MS
QT - MUSE: 332 MS
QT - MUSE: 374 MS
QTC - MUSE: 395 MS
QTC - MUSE: 423 MS
QTC - MUSE: 430 MS
R AXIS - MUSE: 47 DEGREES
R AXIS - MUSE: 58 DEGREES
R AXIS - MUSE: 60 DEGREES
SODIUM SERPL-SCNC: 139 MMOL/L (ref 135–145)
SYSTOLIC BLOOD PRESSURE - MUSE: NORMAL MMHG
T AXIS - MUSE: 261 DEGREES
T AXIS - MUSE: 52 DEGREES
T AXIS - MUSE: 56 DEGREES
VENTRICULAR RATE- MUSE: 101 BPM
VENTRICULAR RATE- MUSE: 187 BPM
VENTRICULAR RATE- MUSE: 77 BPM

## 2025-02-06 PROCEDURE — 96372 THER/PROPH/DIAG INJ SC/IM: CPT

## 2025-02-06 PROCEDURE — 99236 HOSP IP/OBS SAME DATE HI 85: CPT | Mod: 25 | Performed by: NURSE PRACTITIONER

## 2025-02-06 PROCEDURE — 250N000013 HC RX MED GY IP 250 OP 250 PS 637

## 2025-02-06 PROCEDURE — G0378 HOSPITAL OBSERVATION PER HR: HCPCS

## 2025-02-06 PROCEDURE — 36415 COLL VENOUS BLD VENIPUNCTURE: CPT

## 2025-02-06 PROCEDURE — 93306 TTE W/DOPPLER COMPLETE: CPT | Mod: 26 | Performed by: INTERNAL MEDICINE

## 2025-02-06 PROCEDURE — 250N000011 HC RX IP 250 OP 636

## 2025-02-06 PROCEDURE — 250N000011 HC RX IP 250 OP 636: Performed by: INTERNAL MEDICINE

## 2025-02-06 PROCEDURE — 82947 ASSAY GLUCOSE BLOOD QUANT: CPT

## 2025-02-06 PROCEDURE — 250N000013 HC RX MED GY IP 250 OP 250 PS 637: Performed by: NURSE PRACTITIONER

## 2025-02-06 PROCEDURE — 93306 TTE W/DOPPLER COMPLETE: CPT

## 2025-02-06 RX ORDER — DILTIAZEM HYDROCHLORIDE 120 MG/1
120 CAPSULE, COATED, EXTENDED RELEASE ORAL DAILY
Qty: 90 CAPSULE | Refills: 3 | Status: SHIPPED | OUTPATIENT
Start: 2025-02-06

## 2025-02-06 RX ORDER — VALACYCLOVIR HYDROCHLORIDE 1 G/1
1000 TABLET, FILM COATED ORAL DAILY
Status: DISCONTINUED | OUTPATIENT
Start: 2025-02-06 | End: 2025-02-06 | Stop reason: HOSPADM

## 2025-02-06 RX ORDER — ACETAMINOPHEN 325 MG/1
650 TABLET ORAL EVERY 6 HOURS PRN
Status: DISCONTINUED | OUTPATIENT
Start: 2025-02-06 | End: 2025-02-06 | Stop reason: HOSPADM

## 2025-02-06 RX ORDER — DILTIAZEM HCL/D5W 125 MG/125
5-15 PLASTIC BAG, INJECTION (ML) INTRAVENOUS CONTINUOUS
Status: DISCONTINUED | OUTPATIENT
Start: 2025-02-06 | End: 2025-02-06

## 2025-02-06 RX ORDER — OMEPRAZOLE 20 MG/1
40 CAPSULE, DELAYED RELEASE ORAL EVERY EVENING
COMMUNITY
Start: 2025-02-06

## 2025-02-06 RX ORDER — DILTIAZEM HYDROCHLORIDE 120 MG/1
120 CAPSULE, COATED, EXTENDED RELEASE ORAL DAILY
Status: DISCONTINUED | OUTPATIENT
Start: 2025-02-06 | End: 2025-02-06 | Stop reason: HOSPADM

## 2025-02-06 RX ORDER — OXYBUTYNIN CHLORIDE 5 MG/1
5 TABLET, EXTENDED RELEASE ORAL AT BEDTIME
Status: DISCONTINUED | OUTPATIENT
Start: 2025-02-06 | End: 2025-02-06 | Stop reason: HOSPADM

## 2025-02-06 RX ORDER — ENOXAPARIN SODIUM 100 MG/ML
40 INJECTION SUBCUTANEOUS EVERY 24 HOURS
Status: DISCONTINUED | OUTPATIENT
Start: 2025-02-06 | End: 2025-02-06 | Stop reason: HOSPADM

## 2025-02-06 RX ADMIN — ACETAMINOPHEN 650 MG: 325 TABLET, FILM COATED ORAL at 11:20

## 2025-02-06 RX ADMIN — ENOXAPARIN SODIUM 40 MG: 40 INJECTION SUBCUTANEOUS at 00:53

## 2025-02-06 RX ADMIN — ACETAMINOPHEN 650 MG: 325 TABLET, FILM COATED ORAL at 03:17

## 2025-02-06 RX ADMIN — OXYBUTYNIN CHLORIDE 5 MG: 5 TABLET, EXTENDED RELEASE ORAL at 00:53

## 2025-02-06 RX ADMIN — VALACYCLOVIR HYDROCHLORIDE 1000 MG: 1 TABLET, FILM COATED ORAL at 07:46

## 2025-02-06 RX ADMIN — DILTIAZEM HYDROCHLORIDE 120 MG: 120 CAPSULE, COATED, EXTENDED RELEASE ORAL at 11:20

## 2025-02-06 RX ADMIN — ATORVASTATIN CALCIUM 20 MG: 20 TABLET, FILM COATED ORAL at 07:46

## 2025-02-06 RX ADMIN — Medication 5 MG/HR: at 05:45

## 2025-02-06 ASSESSMENT — ACTIVITIES OF DAILY LIVING (ADL)
ADLS_ACUITY_SCORE: 42
ADLS_ACUITY_SCORE: 42
ADLS_ACUITY_SCORE: 41
ADLS_ACUITY_SCORE: 42
ADLS_ACUITY_SCORE: 50
ADLS_ACUITY_SCORE: 41
ADLS_ACUITY_SCORE: 50
ADLS_ACUITY_SCORE: 41
ADLS_ACUITY_SCORE: 50

## 2025-02-06 NOTE — PLAN OF CARE
"/73   Pulse 82   Temp 97.7  F (36.5  C) (Oral)   Resp 16   Ht 1.651 m (5' 5\")   Wt 74.8 kg (165 lb)   LMP  (LMP Unknown)   SpO2 99%   BMI 27.46 kg/m      VSS, afebrile. Dilt gtt discontinued this morning, pt remains in SR on tele. Discharge instructions discussed with pt and paperwork given. PIVs and tele d/c'd. Pt expresses understanding and states no questions or concerns at this time.   "

## 2025-02-06 NOTE — ED NOTES
Bed: ED18  Expected date:   Expected time:   Means of arrival:   Comments:  [facility]

## 2025-02-06 NOTE — ED NOTES
Pt is A/O x 4 very pleasant. Pt's srini Santos is at BS.  Pt came in for syncopal episode. Pt had an episode of A Fib with RVR. Diltiazem 10 mg loading dose given as ordered. Pt is currently on Dilatiazem drip 5 mg/hr. Pt is currently on SR's with few PVC's. Currently on O2 LPM. Pt has 2 PIV's on L lower FA.  Commode at BS. SBA with transfers. Currently denies any CP or SOB.  Report has been given to Demetra CLAYTON RN.

## 2025-02-06 NOTE — ED PROVIDER NOTES
ED Provider Note  [facility]      History     Chief Complaint   Patient presents with    Syncope     About 6:15pm pt was driving, felt like she was going to pass out, pulled over and called 911, EMS did EKG, she did not want to go by ambulance because of the bill.   Before almost passing out pt gets paresthesia in both arms, heart races, and she feels like head falls to the side, and labored breathing. Denies chest pain. Has not had anything like this happen before.       HPI  Sharron Shipley is a 54 year old female with past medical history significant for hyperlipidemia on atorvastatin who presents today with her niece for evaluation of shortness of breath, palpitations, and paresthesias. She reports around 1 hour prior to arrival, she was driving home when she developed palpitations, labored breathing, lightheadedness, and numbness and tingling in her fingers and toes. She waited until the episode resolved and then tried to drive again but had another similar episode, prompting her to call 911. EMS collected an EKG although patient denies them noting anything abnormal. Her niece picked her up and drove her here, noted she had 5-6 similar episodes on their [address] drive. She denies loss of consciousness but notes that her head rolls to the side with these episodes. No chest pain, nausea, vomiting, abdominal pain, vision changes, gait changes, or other concerns at this time. Has never experienced this before. Denies recent illness or diet changes.     Past Medical History  Past Medical History:   Diagnosis Date    Cervical high risk HPV (human papillomavirus) test positive 2020 2021    High cholesterol     Kidney stone     PONV (postoperative nausea and vomiting)     Ventricular tachycardia (H) 2/5/2025     Past Surgical History:   Procedure Laterality Date    BIOPSY      ESOPHAGOSCOPY, GASTROSCOPY, DUODENOSCOPY (EGD), DILATATION, COMBINED N/A 05/10/2021    Procedure:  "ESOPHAGOGASTRODUODENOSCOPY, WITH DILATION of lower esophageal sphincter;  Surgeon: Valentin Taylor MD;  Location: [facility]    GI SURGERY      HYSTERECTOMY VAGINAL, BILATERAL SALPINGO-OOPHERECTOMY, COMBINED Bilateral 06/07/2022    Procedure: Transvaginal hysterectomy, bilateral salpingo-oophorectomy, cystoscopy ,Excision of vulvar Skin Tag;  Surgeon: Trini Schultz MD;  Location: [facility]     atorvastatin (LIPITOR) 20 MG tablet  estradiol (VIVELLE-DOT) 0.1 MG/24HR bi-weekly patch  omeprazole (PRILOSEC) 20 MG DR capsule  omeprazole (PRILOSEC) 20 MG DR capsule  oxyBUTYnin ER (DITROPAN XL) 5 MG 24 hr tablet  valACYclovir (VALTREX) 1000 mg tablet  valACYclovir (VALTREX) 500 MG tablet      No Known Allergies  Family History  Family History   Problem Relation Age of Onset    Diabetes Mother     Hyperlipidemia Mother     Sleep Apnea Father     Thyroid Disease Father     Diabetes Son     Attention Deficit Disorder Son     Attention Deficit Disorder Daughter     Diabetes Son     Thyroid Disease Daughter      Social History   Social History     Tobacco Use    Smoking status: Never     Passive exposure: Never    Smokeless tobacco: Never   Vaping Use    Vaping status: Never Used   Substance Use Topics    Alcohol use: Not Currently     Comment: rare    Drug use: Never      Past medical history, past surgical history, medications, allergies, family history, and social history were reviewed with the patient. No additional pertinent items.   A medically appropriate review of systems was performed with pertinent positives and negatives noted in the HPI, and all other systems negative.    Physical Exam   BP: 105/44  Pulse: 116  Temp: 98.1  F (36.7  C)  Resp: 16  Height: 165.1 cm (5' 5\")  Weight: 74.8 kg (165 lb)  SpO2: 100 %  Physical Exam  Constitutional:       Appearance: She is not ill-appearing or toxic-appearing.   HENT:      Head: Normocephalic.      Nose: No congestion or rhinorrhea.      Mouth/Throat:      Mouth: Mucous " membranes are moist.      Pharynx: Oropharynx is clear.   Eyes:      Extraocular Movements: Extraocular movements intact.      Pupils: Pupils are equal, round, and reactive to light.   Cardiovascular:      Rate and Rhythm: Tachycardia present. Rhythm irregular.   Pulmonary:      Effort: Pulmonary effort is normal.      Breath sounds: Normal breath sounds. No wheezing, rhonchi or rales.   Abdominal:      General: Abdomen is flat.      Palpations: Abdomen is soft.      Tenderness: There is no abdominal tenderness. There is no guarding.   Musculoskeletal:      Cervical back: Normal range of motion and neck supple. No rigidity.   Skin:     General: Skin is warm and dry.      Capillary Refill: Capillary refill takes less than 2 seconds.   Neurological:      General: No focal deficit present.      Mental Status: She is alert and oriented to person, place, and time.      Cranial Nerves: Cranial nerves 2-12 are intact.      Sensory: No sensory deficit.      Motor: No weakness or pronator drift.      Coordination: Romberg sign negative.      Gait: Gait normal.         ED Course, Procedures, & Data      Procedures            EKG Interpretation:      Interpreted by Selin Patel PA-C  Time reviewed: 1957  Symptoms at time of EKG: palpitations, shortness of breath   Rhythm: atrial fibrillation - rapid  Rate: 150-160  Axis: Normal  Ectopy: none  Conduction: normal  ST Segments/ T Waves: No acute ischemic changes  Q Waves: none  Comparison to prior: No old EKG available    Clinical Impression: atrial fibrillation (new onset)       Results for orders placed or performed during the hospital encounter of 02/05/25   [place] Draw *Canceled*     Status: None ()    Narrative    The following orders were created for panel order [place] Draw.  Procedure                               Abnormality         Status                     ---------                               -----------         ------                       Please view  results for these tests on the individual orders.   TSH with free T4 reflex     Status: Abnormal   Result Value Ref Range    TSH 6.60 (H) 0.30 - 4.20 uIU/mL   Comprehensive metabolic panel     Status: Abnormal   Result Value Ref Range    Sodium 143 135 - 145 mmol/L    Potassium 3.5 3.4 - 5.3 mmol/L    Carbon Dioxide (CO2) 25 22 - 29 mmol/L    Anion Gap 12 7 - 15 mmol/L    Urea Nitrogen 9.6 6.0 - 20.0 mg/dL    Creatinine 0.83 0.51 - 0.95 mg/dL    GFR Estimate 83 >60 mL/min/1.73m2    Calcium 8.9 8.8 - 10.4 mg/dL    Chloride 106 98 - 107 mmol/L    Glucose 121 (H) 70 - 99 mg/dL    Alkaline Phosphatase 86 40 - 150 U/L    AST 17 0 - 45 U/L    ALT 14 0 - 50 U/L    Protein Total 7.1 6.4 - 8.3 g/dL    Albumin 4.4 3.5 - 5.2 g/dL    Bilirubin Total 0.4 <=1.2 mg/dL   Troponin T, High Sensitivity     Status: Normal   Result Value Ref Range    Troponin T, High Sensitivity <6 <=14 ng/L   Magnesium     Status: Normal   Result Value Ref Range    Magnesium 1.8 1.7 - 2.3 mg/dL   [place] Draw     Status: None    Narrative    The following orders were created for panel order [place] Draw.  Procedure                               Abnormality         Status                     ---------                               -----------         ------                     Extra Blue Top Tube[[id number]]                              Final result               Extra Red Top Tube[[id number]]                               Final result                 Please view results for these tests on the individual orders.   CBC with platelets and differential     Status: None   Result Value Ref Range    WBC Count 7.2 4.0 - 11.0 10e3/uL    RBC Count 4.69 3.80 - 5.20 10e6/uL    Hemoglobin 14.7 11.7 - 15.7 g/dL    Hematocrit 41.9 35.0 - 47.0 %    MCV 89 78 - 100 fL    MCH 31.3 26.5 - 33.0 pg    MCHC 35.1 31.5 - 36.5 g/dL    RDW 12.4 10.0 - 15.0 %    Platelet Count 269 150 - 450 10e3/uL    % Neutrophils 56 %    % Lymphocytes 37 %    % Monocytes 6 %    % Eosinophils 1 %     % Basophils 0 %    % Immature Granulocytes 0 %    NRBCs per 100 WBC 0 <1 /100    Absolute Neutrophils 4.0 1.6 - 8.3 10e3/uL    Absolute Lymphocytes 2.7 0.8 - 5.3 10e3/uL    Absolute Monocytes 0.4 0.0 - 1.3 10e3/uL    Absolute Eosinophils 0.1 0.0 - 0.7 10e3/uL    Absolute Basophils 0.0 0.0 - 0.2 10e3/uL    Absolute Immature Granulocytes 0.0 <=0.4 10e3/uL    Absolute NRBCs 0.0 10e3/uL   Extra Blue Top Tube     Status: None   Result Value Ref Range    Hold Specimen JIC    Extra Red Top Tube     Status: None   Result Value Ref Range    Hold Specimen JIC    UA with Microscopic reflex to Culture     Status: Abnormal    Specimen: Urine, Clean Catch   Result Value Ref Range    Color Urine Straw Colorless, Straw, Light Yellow, Yellow    Appearance Urine Clear Clear    Glucose Urine Negative Negative mg/dL    Bilirubin Urine Negative Negative    Ketones Urine Negative Negative mg/dL    Specific Gravity Urine 1.004 1.003 - 1.035    Blood Urine Negative Negative    pH Urine 7.0 5.0 - 7.0    Protein Albumin Urine Negative Negative mg/dL    Urobilinogen Urine Normal Normal, 2.0 mg/dL    Nitrite Urine Negative Negative    Leukocyte Esterase Urine Negative Negative    RBC Urine 0 <=2 /HPF    WBC Urine 1 <=5 /HPF    Squamous Epithelials Urine 2 (H) <=1 /HPF    Transitional Epithelials Urine <1 <=1 /HPF    Narrative    Urine Culture not indicated   T4 free     Status: Normal   Result Value Ref Range    Free T4 1.12 0.90 - 1.70 ng/dL   EKG 12-lead, tracing only     Status: None (Preliminary result)   Result Value Ref Range    Systolic Blood Pressure  mmHg    Diastolic Blood Pressure  mmHg    Ventricular Rate 101 BPM    Atrial Rate 101 BPM    NH Interval 160 ms    QRS Duration 74 ms     ms    QTc 430 ms    P Axis 72 degrees    R AXIS 58 degrees    T Axis 56 degrees    Interpretation ECG Sinus tachycardia  Otherwise normal ECG      CBC with platelets differential     Status: None    Narrative    The following orders were  created for panel order CBC with platelets differential.  Procedure                               Abnormality         Status                     ---------                               -----------         ------                     CBC with platelets and d...[[id number]]                      Final result                 Please view results for these tests on the individual orders.     Medications   diltiazem (CARDIZEM) 125 mg in sodium chloride 0.9 % 125 mL infusion (10 mg/hr Intravenous $New Bag 2/5/25 2154)   sodium chloride 0.9% BOLUS 1,000 mL (0 mLs Intravenous Stopped 2/5/25 2058)   diltiazem (CARDIZEM) injection 10 mg (10 mg Intravenous $Given 2/5/25 1955)     Labs Ordered and Resulted from Time of ED Arrival to Time of ED Departure   TSH WITH FREE T4 REFLEX - Abnormal       Result Value    TSH 6.60 (*)    COMPREHENSIVE METABOLIC PANEL - Abnormal    Sodium 143      Potassium 3.5      Carbon Dioxide (CO2) 25      Anion Gap 12      Urea Nitrogen 9.6      Creatinine 0.83      GFR Estimate 83      Calcium 8.9      Chloride 106      Glucose 121 (*)     Alkaline Phosphatase 86      AST 17      ALT 14      Protein Total 7.1      Albumin 4.4      Bilirubin Total 0.4     ROUTINE UA WITH MICROSCOPIC REFLEX TO CULTURE - Abnormal    Color Urine Straw      Appearance Urine Clear      Glucose Urine Negative      Bilirubin Urine Negative      Ketones Urine Negative      Specific Gravity Urine 1.004      Blood Urine Negative      pH Urine 7.0      Protein Albumin Urine Negative      Urobilinogen Urine Normal      Nitrite Urine Negative      Leukocyte Esterase Urine Negative      RBC Urine 0      WBC Urine 1      Squamous Epithelials Urine 2 (*)     Transitional Epithelials Urine <1     TROPONIN T, HIGH SENSITIVITY - Normal    Troponin T, High Sensitivity <6     MAGNESIUM - Normal    Magnesium 1.8     T4 FREE - Normal    Free T4 1.12     CBC WITH PLATELETS AND DIFFERENTIAL    WBC Count 7.2      RBC Count 4.69      Hemoglobin  14.7      Hematocrit 41.9      MCV 89      MCH 31.3      MCHC 35.1      RDW 12.4      Platelet Count 269      % Neutrophils 56      % Lymphocytes 37      % Monocytes 6      % Eosinophils 1      % Basophils 0      % Immature Granulocytes 0      NRBCs per 100 WBC 0      Absolute Neutrophils 4.0      Absolute Lymphocytes 2.7      Absolute Monocytes 0.4      Absolute Eosinophils 0.1      Absolute Basophils 0.0      Absolute Immature Granulocytes 0.0      Absolute NRBCs 0.0       No orders to display          Critical Care Addendum  My initial assessment, based on my review of nursing observations, review of vital signs, focused history, physical exam, review of cardiac rhythm monitor, and 12 lead ECG analysis, established a high suspicion that Sharron Shipley has a critical arrhythmia, which requires immediate intervention, and therefore she is critically ill.     After the initial assessment, the care team initiated multiple lab tests, initiated IV fluid administration, and initiated medication therapy with diltiazem  to provide stabilization care. Due to the critical nature of this patient, I reassessed nursing observations, vital signs, and review of cardiac rhythm monitor multiple times prior to her disposition.     Time also spent performing documentation, discussion with family to obtain medical information for decision making, reviewing test results, and discussion with consultants.     Critical Care time was 60 minutes for this patient excluding procedures.    Assessment & Plan    Sharron Shipley is a 54 year old female with past medical history significant for hyperlipidemia on atorvastatin who presents today with her niece for evaluation of shortness of breath, palpitations, and paresthesias to her fingers and toes. She reports around 6:15 tonight, she was driving home when she developed palpitations, labored breathing, lightheadedness, and numbness and tingling in her fingers and toes. Following the  initial episode, she had 6 or 7 additional episodes, each lasting a few minutes at a time. Has never experienced this before, denies recent illness or diet changes. While in the emergency department, cardiac monitor showed her alternating from atrial fibrillation with RVR up to the low 200s to sinus rhythm although did have at least one episode of 8 sustained beats of ventricular tachycardia. She was moved from room 20 to room 1. Given 1 liter of normal saline and started diltiazem for rate control, 10 mL initial dose followed by drip. Lab work overall reassuring including normal CBC, CMP, magnesium, and UA. Negative initial troponin. TSH elevated to 6.6, normal T4 at 1.12. Attending physician Dr. Borden spoke with cardiology at 8:05 PM who did not recommend any changes to her current plan. Patient has been hemodynamically stable while in the ED and responding well to diltiazem, rate controlled but still intermittently in atrial fibrillation vs. sinus rhythm and patient is physically feeling better. Spoke with cardiology attending Dr. Chun who accepted the patient. Discussed patient with cardiology fellow. Plan to transfer patient to [place] for admission to cardiology, accepted by ED Dr. Middleton. Patient and family in understanding and agreement with plan of care.     --    ED Attending Physician Attestation    I Vahe Borden DO, cared for this patient with the Advanced Practice Provider ([facility]). I personally provided a substantive portion of the care for this patient, including approving the care plan for the number and complexity of problems addressed and taking responsibility related to the risk of complications and/or morbidity or mortality of patient management. Please see the [facility]AL's documentation for full details.          Vahe Borden DO  Emergency Medicine      I have reviewed the nursing notes. I have reviewed the findings, diagnosis, plan and need for follow up with the patient.    New  Prescriptions    No medications on file       Final diagnoses:   Atrial fibrillation with rapid ventricular response (H)   Ventricular tachycardia (H)     Selin Patel PA-C  [facility] EMERGENCY DEPARTMENT  2/5/2025     Selin Patel PA-C  02/05/25 2158       Vahe Borden DO  02/06/25 0027

## 2025-02-06 NOTE — ED TRIAGE NOTES
About 6:15pm pt was driving, felt like she was going to pass out, pulled over and called 911, EMS did EKG, she did not want to go by ambulance because of the bill.   Before almost passing out pt gets paresthesia in both arms, heart races, and she feels like head falls to the side, and labored breathing. Denies chest pain. Has not had anything like this happen before.       Triage Assessment (Adult)       Row Name 02/05/25 9375          Triage Assessment    Airway WDL WDL        Respiratory WDL    Respiratory WDL WDL        Skin Circulation/Temperature WDL    Skin Circulation/Temperature WDL WDL        Cardiac WDL    Cardiac WDL X;rhythm     Pulse Rate & Regularity tachycardic        Peripheral/Neurovascular WDL    Peripheral Neurovascular WDL WDL        Cognitive/Neuro/Behavioral WDL    Cognitive/Neuro/Behavioral WDL WDL

## 2025-02-06 NOTE — ED TRIAGE NOTES
Pt transferred from [facility] ED after pt went in today due to a syncopal episode and was seen to be in Afib RVR HR-200's, Diltiazem loading dose was given and continuous was 10mg/hr. HR en rout in 70's for EMS.Pt here for cardiology consult.      Triage Assessment (Adult)       Row Name 02/05/25 5993          Triage Assessment    Airway WDL WDL        Respiratory WDL    Respiratory WDL WDL        Skin Circulation/Temperature WDL    Skin Circulation/Temperature WDL WDL        Cardiac WDL    Cardiac WDL X;rhythm     Pulse Rate & Regularity tachycardic        Peripheral/Neurovascular WDL    Peripheral Neurovascular WDL WDL        Cognitive/Neuro/Behavioral WDL    Cognitive/Neuro/Behavioral WDL WDL

## 2025-02-06 NOTE — PLAN OF CARE
Pt A&Ox4, VSS on RA. Continues on diltiazam gtt @ 5mg/hr, pt stayed in sinus rhythm all night on telemetry, rates in 60-80's. PRN tylenol x1 for HA. Moves with standby assistance.    Janiya Mcdonough RN on 2/6/2025 at 6:29 AM

## 2025-02-06 NOTE — DISCHARGE SUMMARY
[address] [address]  p: [phone]    Discharge Summary: Cardiology Service    Sharron Shipley MRN# [id number]   YOB: 1970 Age: 54 year old       Admission Date: 02/05/2025  Discharge Date: 02/06/2025    Discharge Diagnoses:  # Paroxysmal atrial fibrillation with RVR  # HLD  # Recurrent herpes labialis  # Urinary incontinence   # GERD    Brief HPI:  Sharron Shipley is a 54 year old female with a history of HLD and recurrent herpes labialis who presents for palpitations and was found to be in afib with RVR and transferred to [place]. Patient started on diltiazem infusion and self-converted to NSR. CHADVASC 1 so no need for A/C at this time, will discharge patient with Holter monitor for AF burden and follow up with cardiology in 4-6 weeks    Hospital Course by Diagnosis:  # Paroxysmal atrial fibrillation with RVR  Pt presenting with new palpitations, lightheadedness, and episodes of syncope. No lifestyle or medications changes with the exception of mushroom coffee. No prior cardiac hx of family hx of cardiac conditions. Rates up to 150s-160s at [facility]. TSH elevated, but T4 WNL. Electrolytes and trop WNL. Received 1 L NS and started on diltiazem with return to sinus rhythm and improvement of rates in the 80s. CHADVASC score 1 for gender. No EKG of AF but telemetry on  ED reviewed and intermittent runs of AF with NSVT noted (see imaging below)  - TTE 2/6/25: EF 60-65%, mild aortic insufficiency, normal RA  - CHADVASC 1 (sex): no A/C needed at this time  - Diltiazem infusion stopped, transition to  mg daily  - discharge with 14-day Zio Patch for AF burden  - Cardiology follow up 4-6 weeks     # HLD  - PTA statin     # Recurrent herpes labialis  - PTA valacyclovir     # Urinary incontience  - PTA oxybutynin      # GERD  - PTA PPI    Pertinent Procedures:  1. None    Consults:  None    Medication Changes:  See below      Discharge medications:   Current Discharge Medication List        START taking these medications    Details   diltiazem ER COATED BEADS (CARDIZEM CD/CARTIA XT) 120 MG 24 hr capsule Take 1 capsule (120 mg) by mouth daily.  Qty: 90 capsule, Refills: 3    Associated Diagnoses: Atrial fibrillation with rapid ventricular response (H)           CONTINUE these medications which have CHANGED    Details   !! omeprazole (PRILOSEC) 20 MG DR capsule Take 2 capsules (40 mg) by mouth every evening.    Associated Diagnoses: Heartburn       !! - Potential duplicate medications found. Please discuss with provider.        CONTINUE these medications which have NOT CHANGED    Details   atorvastatin (LIPITOR) 20 MG tablet Take 1 tablet (20 mg) by mouth daily.  Qty: 90 tablet, Refills: 4    Associated Diagnoses: Hyperlipidemia LDL goal <130      estradiol (VIVELLE-DOT) 0.1 MG/24HR bi-weekly patch Place 1 patch onto the skin twice a week.  Qty: 24 patch, Refills: 4    Associated Diagnoses: Symptomatic menopausal or female climacteric states      !! omeprazole (PRILOSEC) 20 MG DR capsule Take 20 mg by mouth daily as needed.      oxyBUTYnin ER (DITROPAN XL) 5 MG 24 hr tablet Take 1 tablet (5 mg) by mouth daily.  Qty: 90 tablet, Refills: 4    Associated Diagnoses: Urge incontinence of urine      !! valACYclovir (VALTREX) 1000 mg tablet Take 1 tablet (1,000 mg) by mouth daily.  Qty: 90 tablet, Refills: 4    Associated Diagnoses: Recurrent cold sores      !! valACYclovir (VALTREX) 500 MG tablet Take 1 tablet daily by mouth as needed for flares  Qty: 30 tablet, Refills: 3    Associated Diagnoses: Recurrent cold sores       !! - Potential duplicate medications found. Please discuss with provider.          Follow-up:  PCP 1 week  Cardiology 4-6 weeks    Labs or imaging requiring follow-up after discharge:  BMP, CBC    Code status:  FULL    Condition on discharge  Temp:  [97.5  F (36.4  C)-98.1  F (36.7  C)] 97.9  F (36.6  C)  Pulse:  []  74  Resp:  [15-16] 16  BP: (105-164)/() 109/74  SpO2:  [98 %-100 %] 100 %  General: Alert, interactive, no acute distress  HEENT: Normocephalic, atraumatic. Sclera anicteric.   Neck: JVP not elevated  Cardiovascular: Regular rate and rhythm, normal S1 and S2, no murmurs, gallops, or rubs. Radial and pedal pulses palpable bilaterally.   Resp: Regular work of breathing on room air. Clear to auscultation bilaterally, no rales, wheezes, or rhonchi  GI: Soft, nontender, nondistended.   Extremities: no edema, no cyanosis or clubbing, warm and well perfused  Skin: Warm and dry, no jaundice or rash  Neuro: Alert and oriented x 3. CN 2-12 intact, moves all extremities equally, normal speech  Psych: Mood and affect are appropriate    Imaging with results:  Telemetry 2/5/25:          Echocardiogram 2/6/25:  Interpretation Summary  Left ventricular size, wall motion and function are normal. The ejection  fraction is 60-65%.  Right ventricular function, chamber size, wall motion, and thickness are  normal.  Mild aortic insufficiency is present.  IVC diameter <2.1 cm collapsing >50% with sniff suggests a normal RA pressure  of 3 mmHg.  There is no prior study for direct comparison.    Recent Labs   Lab 02/06/25  0653 02/05/25  1942    143   POTASSIUM 4.1 3.5   CHLORIDE 107 106   CO2 24 25   ANIONGAP 8 12   * 121*   BUN 9.1 9.6   CR 0.73 0.83   GFRESTIMATED >90 83   AMISH 8.3* 8.9   MAG  --  1.8         Patient Care Team:  Michael Tejada DO as PCP - General (Family Medicine)  Michael Tejada DO as Assigned PCP  El Osei MD as Assigned Musculoskeletal Provider  Alan Burgos DO as Physician (Gastroenterology)    Time Spent on this Encounter   I, PACHECO Castle CNP, personally saw the patient today and spent greater than 30 minutes discharging this patient.    >30 minutes spent in discharge, including >50% in counseling and coordination of care, medication review and plan of care  recommended on follow up. Questions were answered.   It was our pleasure to care for Sharron Shipley during this hospitalization. Please do not hesitate to contact me should there be questions regarding the hospital course or discharge plan.    Patient discussed with staff cardiologist, Dr. Mata, who agrees with the above documentation and plan. Documentation represents joint decision making.     PACHECO Curry, CNP  [facility] Cardiology

## 2025-02-06 NOTE — ED NOTES
"Pt experiences one syncopal episode with HR at 190bpm. Pt endorsed no cardiac hx personally or familial cardiac history. Pt endorsed \"never fainting before\", no additional stressors or lifestyle changes recently.     MD at bedside immediately following near syncopal episode, pt did not lose consciousness and able to respond to voice.   "

## 2025-02-06 NOTE — ED NOTES
[facility] EMS just arrived report was given.  Diltiazem drip on going at 10 mg/hr.  [place] ED was called and Vera was updated that EMS is in the process of transferring pt  Pt had 3 belongings bag all labeled. Pt has her purse and phone with her.

## 2025-02-06 NOTE — MEDICATION SCRIBE - ADMISSION MEDICATION HISTORY
Medication Scribe Admission Medication History    Admission medication history is complete. The information provided in this note is only as accurate as the sources available at the time of the update.    Information Source(s): Patient and CareEverywhere/SureScripts via in-person    Pertinent Information: Patient reported she changes her estradiol patch every 3 days, currently has one on that she placed yesterday 2/4/25 and will change on Friday 2/7/25.     Changes made to PTA medication list:  Added:  daily prn omeprazole  Deleted: estradiol 10 mcg vaginal tablet, benzonatate 100mg  Changed: omeprazole changed from 60mg every day to 40mg every evening and a prn during the day    Allergies reviewed with patient and updates made in EHR: yes    Medication History Completed By: Violette Cordova 2/5/2025 9:38 PM    PTA Med List   Medication Sig Last Dose/Taking    atorvastatin (LIPITOR) 20 MG tablet Take 1 tablet (20 mg) by mouth daily. 2/4/2025 Evening    estradiol (VIVELLE-DOT) 0.1 MG/24HR bi-weekly patch Place 1 patch onto the skin twice a week. 2/4/2025    omeprazole (PRILOSEC) 20 MG DR capsule Take 20 mg by mouth daily as needed. Past Week    omeprazole (PRILOSEC) 20 MG DR capsule Take 3 capsules (60 mg) by mouth daily. (Patient taking differently: Take 40 mg by mouth every evening.) 2/4/2025 Evening    oxyBUTYnin ER (DITROPAN XL) 5 MG 24 hr tablet Take 1 tablet (5 mg) by mouth daily. 2/4/2025 Evening    valACYclovir (VALTREX) 1000 mg tablet Take 1 tablet (1,000 mg) by mouth daily. 2/4/2025 Evening    valACYclovir (VALTREX) 500 MG tablet Take 1 tablet daily by mouth as needed for flares More than a month

## 2025-02-06 NOTE — ED NOTES
During triage, pt did feel near syncopal episode. HR on monitor went from 110 to 186bpm, then returned to 110s.

## 2025-02-06 NOTE — H&P
"        [facility]    Cardiology History and Physical - Cardiology         Date of Admission:  2/5/2025    Assessment & Plan: HVSL    Sharron Shipley is a 54 year old female admitted on 2/5/2025. She has a hx of HLD and recurrent herpes labialis who presents for palpitations and was found to be in afib with RVR and transferred to [place].    Afib with RVR  Pt presenting with new palpitations, lightheadedness, and episodes of syncope. No lifestyle or medications changes with the exception of mushroom coffee. No prior cardiac hx of family hx of cardiac conditions. Rates up to 150s-160s at [facility]. TSH elevated, but T4 WNL. Electrolytes and trop WNL. Received I L NS and started on diltiazem with return to sinus rhythm and improvement of rates in the 80s. CHADVASC score 1 for gender.   - Continue diltiazem gtt   - Lovenox for ppx  - Tele  - Consider echo in the AM    HLD  - PTA statin    Recurrent herpes labialis  - PTA valacyclovir    Urinary incontience  - PTA oxybutynin     GERD  - PPI       Diet:  Regular  DVT Prophylaxis: Enoxaparin (Lovenox) SQ  Dawson Catheter: Not present  Cardiac Monitoring: None  Code Status:  Full Code        Clinically Significant Risk Factors Present on Admission                             # Overweight: Estimated body mass index is 27.46 kg/m  as calculated from the following:    Height as of this encounter: 1.651 m (5' 5\").    Weight as of this encounter: 74.8 kg (165 lb).                 Disposition Plan   Expected discharge:  1-2 days , recommended to prior living arrangement once arrhythmia stabilized .    Entered: Camille Archer MD 02/05/2025, 9:42 PM   The patient will be staffed in the AM      Camille Archer MD  Internal Medicine PGY-2   [facility]    ______________________________________________________________________    Chief Complaint   Palpitations    History is obtained from " the patient    History of Present Illness   Sharron Shipley is a 54 year old female with a hx of HLD and recurrent herpes labialis who presents for palpitations and was found to be in afib with RVR.    Dagoberto reports that her symptoms began with palpitations when she was driving in her car.  Associated symptoms included shortness of breath, lightheadedness/dizziness, numbness and tingling in her arms, and a brief episode of syncope.  She waited for her symptoms to subside before resuming driving.  However, she had another episode which prompted her to present to the ED.  She notes that on her oh way to the ED, she had 5-6 similar episodes.  No chest pain, nausea, vision changes, headache, falls.  He has never experienced anything like this before.  She does not have a cardiac history and does not have a family history of cardiac conditions.  She notes no recent medication changes, particular life stressors, or general lifestyle changes.  However, with further prompting, she notes that she started drinking a mushroom coffee that she ordered online for the past month.  This is the only change she has noted in her diet.      Past Medical History    Past Medical History:   Diagnosis Date    Cervical high risk HPV (human papillomavirus) test positive 2020 2021    High cholesterol     Kidney stone     PONV (postoperative nausea and vomiting)     Ventricular tachycardia (H) 2/5/2025       Past Surgical History   Past Surgical History:   Procedure Laterality Date    BIOPSY      ESOPHAGOSCOPY, GASTROSCOPY, DUODENOSCOPY (EGD), DILATATION, COMBINED N/A 05/10/2021    Procedure: ESOPHAGOGASTRODUODENOSCOPY, WITH DILATION of lower esophageal sphincter;  Surgeon: Valentin Taylor MD;  Location: [facility]    GI SURGERY      HYSTERECTOMY VAGINAL, BILATERAL SALPINGO-OOPHERECTOMY, COMBINED Bilateral 06/07/2022    Procedure: Transvaginal hysterectomy, bilateral salpingo-oophorectomy, cystoscopy ,Excision of vulvar Skin Tag;  Surgeon:  Trini Schultz MD;  Location: [facility]       Prior to Admission Medications   Prior to Admission Medications   Prescriptions Last Dose Informant Patient Reported? Taking?   atorvastatin (LIPITOR) 20 MG tablet 2/4/2025 Evening  No Yes   Sig: Take 1 tablet (20 mg) by mouth daily.   estradiol (VIVELLE-DOT) 0.1 MG/24HR bi-weekly patch 2/4/2025  No Yes   Sig: Place 1 patch onto the skin twice a week.   omeprazole (PRILOSEC) 20 MG DR capsule 2/4/2025 Evening  No Yes   Sig: Take 3 capsules (60 mg) by mouth daily.   Patient taking differently: Take 40 mg by mouth every evening.   omeprazole (PRILOSEC) 20 MG DR capsule Past Week  Yes Yes   Sig: Take 20 mg by mouth daily as needed.   oxyBUTYnin ER (DITROPAN XL) 5 MG 24 hr tablet 2/4/2025 Evening  No Yes   Sig: Take 1 tablet (5 mg) by mouth daily.   valACYclovir (VALTREX) 1000 mg tablet 2/4/2025 Evening  No Yes   Sig: Take 1 tablet (1,000 mg) by mouth daily.   valACYclovir (VALTREX) 500 MG tablet More than a month  No Yes   Sig: Take 1 tablet daily by mouth as needed for flares      Facility-Administered Medications: None           Physical Exam   Vital Signs: Temp: 98.1  F (36.7  C) Temp src: Oral BP: 121/84 Pulse: 98   Resp: 16 SpO2: 99 % O2 Device: None (Room air)    Weight: 165 lbs 0 oz    General Appearance: NAD, lying comfortably in bed  Respiratory: CTA bilaterally without crackles or wheezes   Cardiovascular: RRR without murmurs  GI: Soft, nontender, nondistended, normal active bowel sounds  Ext: No BLE   Neuro: A&Ox3  Skin: No rashes or lesions      Medical Decision Making       Please see A&P for additional details of medical decision making.      Data     I have personally reviewed the following data over the past 24 hrs:    7.2  \   14.7   / 269     143 106 9.6 /  121 (H)   3.5 25 0.83 \     ALT: 14 AST: 17 AP: 86 TBILI: 0.4   ALB: 4.4 TOT PROTEIN: 7.1 LIPASE: N/A     Trop: <6 BNP: N/A     TSH: 6.60 (H) T4: 1.12 A1C: N/A       Imaging results reviewed over  the past 24 hrs:   No results found for this or any previous visit (from the past 24 hours).

## 2025-02-13 ENCOUNTER — OFFICE VISIT (OUTPATIENT)
Dept: FAMILY MEDICINE | Facility: CLINIC | Age: 55
End: 2025-02-13
Payer: COMMERCIAL

## 2025-02-13 VITALS
HEART RATE: 66 BPM | OXYGEN SATURATION: 99 % | SYSTOLIC BLOOD PRESSURE: 110 MMHG | HEIGHT: 65 IN | TEMPERATURE: 97.2 F | RESPIRATION RATE: 16 BRPM | BODY MASS INDEX: 28.21 KG/M2 | WEIGHT: 169.3 LBS | DIASTOLIC BLOOD PRESSURE: 78 MMHG

## 2025-02-13 DIAGNOSIS — I48.91 ATRIAL FIBRILLATION WITH RAPID VENTRICULAR RESPONSE (H): Primary | ICD-10-CM

## 2025-02-13 LAB
ERYTHROCYTE [DISTWIDTH] IN BLOOD BY AUTOMATED COUNT: 12.7 % (ref 10–15)
HCT VFR BLD AUTO: 38.7 % (ref 35–47)
HGB BLD-MCNC: 12.8 G/DL (ref 11.7–15.7)
MCH RBC QN AUTO: 30.5 PG (ref 26.5–33)
MCHC RBC AUTO-ENTMCNC: 33.1 G/DL (ref 31.5–36.5)
MCV RBC AUTO: 92 FL (ref 78–100)
PLATELET # BLD AUTO: 240 10E3/UL (ref 150–450)
RBC # BLD AUTO: 4.19 10E6/UL (ref 3.8–5.2)
WBC # BLD AUTO: 5 10E3/UL (ref 4–11)

## 2025-02-13 ASSESSMENT — PAIN SCALES - GENERAL: PAINLEVEL_OUTOF10: NO PAIN (0)

## 2025-02-13 NOTE — PROGRESS NOTES
Assessment & Plan     Atrial fibrillation with rapid ventricular response (H)  Vital signs normal, patient is stable in clinic today and asymptomatic. Lightheadedness could be recurrent bouts of AF vs side effect from diltiazem. Question whether oxybutynin could poss trigger for AF.  No red flag symptoms today, recommend getting follow-up lab work and continue following up with cardiology in April as planned. We will not make any changes to her diltiazem today and await results of her Zio patch to assess AF burden.  Discussed return precautions with patient - emphasized that she should return to the ED or call 911 if she starts experiencing worsening or changing of her symptoms, including chest pain, syncope, shortness of breath, or palpitations that do not resolve.  - CBC with platelets; Future  - Basic metabolic panel  (Ca, Cl, CO2, Creat, Gluc, K, Na, BUN); Future      MED REC REQUIRED  Post Medication Reconciliation Status:  Discharge medications reconciled and changed, see notes/orders  Discharge medications reconciled, continue medications without change    The longitudinal plan of care for the diagnosis(es)/condition(s) as documented were addressed during this visit. Due to the added complexity in care, I will continue to support Dagoberto in the subsequent management and with ongoing continuity of care.      Subjective   Dagoberto is a 54 year old, presenting for the following health issues:  Hospital F/U        2/13/2025     1:39 PM   Additional Questions   Roomed by Andrade Adams   Accompanied by Self     HPI     Dagoberto here today for follow-up of hospital admission on 02/05/25.  To summarize, she presented to the ED with new palpitations, lightheadedness, syncope and was found to be in A-fib RVR with rates up to 150s to 160s.  She was started on a diltiazem infusion and self converted to normal sinus rhythm.  At that time she was transferred to [place] for admission to cardiology service. CHADVASC score was 1 for  "gender so anticoagulation was not needed. Had TTE on 2/6/25 which showed EF 60-65%, mild aortic insufficiency, normal RA.  Diltiazem infusion transition to 120 mg p.o. daily and she was discharged with 14-day Zio patch to evaluate for AF burden.  Has plan to follow-up with cardiology in 4 to 6 weeks.    Since discharge patient is feeling overall well but is still having intermittent episodes of palpitations/chest discomfort accompanied with lightheadedness and head fullness.  Episodes last no longer than 1 minute.  However she notes her symptoms have been nowhere near as bad as what prompted her to go to the emergency department.  No presyncopal or syncopal episodes since discharge.  No recent paresthesias, no chest pain or shortness of breath.  She has been trying to reduce her caffeine intake, she is now down to under 400 mg/day compared to her usual intake of a 1200 to 1400 mg of caffeine a day.  She is on day 3 of her Zio patch.  Taking her diltiazem 120 mg daily as prescribed.    ED/UC Followup:    Facility:  [facility] Emergency Department  Date of visit: 2/5/2025 - 2/6/2025 (16 hours)   Reason for visit: Syncope   Current Status: Pt stated that she is doing good, not lighted as much.         Objective    /78   Pulse 66   Temp 97.2  F (36.2  C) (Temporal)   Resp 16   Ht 1.651 m (5' 5\")   Wt 76.8 kg (169 lb 4.8 oz)   LMP  (LMP Unknown)   SpO2 99%   BMI 28.17 kg/m    Body mass index is 28.17 kg/m .  Physical Exam   GENERAL: alert and no distress  EYES: Eyes grossly normal to inspection, conjunctivae and sclerae normal  RESP: lungs clear to auscultation - no rales, rhonchi or wheezes  CV: regular rate and rhythm, normal S1 S2, no S3 or S4, no murmur, click or rub, no peripheral edema  MS: no gross musculoskeletal defects noted, no edema  NEURO: Normal strength and tone, mentation intact and speech normal  PSYCH: mentation appears normal, affect normal          VIRGINIA Lacy    I " personally supervised the student listed above. The above note is reflective of my independent physical exam and medical decision making.    Sunshine Phelps PA-C 2/13/2025, 4:01 PM  [facility]        Signed Electronically by: Sunshine Phelps PA-C

## 2025-02-18 ENCOUNTER — TELEPHONE (OUTPATIENT)
Dept: CARDIOLOGY | Facility: CLINIC | Age: 55
End: 2025-02-18
Payer: COMMERCIAL

## 2025-02-18 NOTE — TELEPHONE ENCOUNTER
Left Voicemail (1st Attempt) for the patient to call back and schedule the following:    Appointment type: rtn cardio   Provider: any gentjay md or eliot   Return date: next available   Specialty phone number: [phone] opt 1   Additional appointment(s) needed: n/a   Additonal Notes: n/a

## 2025-02-20 ENCOUNTER — TELEPHONE (OUTPATIENT)
Dept: CARDIOLOGY | Facility: CLINIC | Age: 55
End: 2025-02-20
Payer: COMMERCIAL

## 2025-02-20 NOTE — TELEPHONE ENCOUNTER
Patient Contacted for the patient to call back and schedule the following:    Appointment type: NEW CARDIO  Provider: CAN  Return date: 04/28/25  Specialty phone number: [phone] OPT1  Additional appointment(s) needed: N/A  Additonal Notes: N/A

## 2025-03-03 LAB — CV ZIO PRELIM RESULTS: NORMAL

## 2025-03-04 ENCOUNTER — E-VISIT (OUTPATIENT)
Dept: URGENT CARE | Facility: CLINIC | Age: 55
End: 2025-03-04
Payer: COMMERCIAL

## 2025-03-04 DIAGNOSIS — N39.0 ACUTE UTI (URINARY TRACT INFECTION): Primary | ICD-10-CM

## 2025-03-04 DIAGNOSIS — B37.31 YEAST INFECTION OF THE VAGINA: ICD-10-CM

## 2025-03-04 PROCEDURE — 99207 PR NON-BILLABLE SERV PER CHARTING: CPT | Performed by: EMERGENCY MEDICINE

## 2025-03-04 RX ORDER — NITROFURANTOIN 25; 75 MG/1; MG/1
100 CAPSULE ORAL 2 TIMES DAILY
Qty: 10 CAPSULE | Refills: 0 | Status: SHIPPED | OUTPATIENT
Start: 2025-03-04 | End: 2025-03-09

## 2025-03-04 RX ORDER — FLUCONAZOLE 150 MG/1
150 TABLET ORAL ONCE
Qty: 1 TABLET | Refills: 0 | Status: SHIPPED | OUTPATIENT
Start: 2025-03-04 | End: 2025-03-04

## 2025-03-04 NOTE — PATIENT INSTRUCTIONS
Dear Sharron Shipley    After reviewing your responses, I've been able to diagnose you with a urinary tract infection, which is a common infection of the bladder with bacteria.  This is not a sexually transmitted infection, though urinating immediately after intercourse can help prevent infections.  Drinking lots of fluids is also helpful to clear your current infection and prevent the next one.      I have sent a prescription for antibiotics to your pharmacy to treat this infection.    It is important that you take all of your prescribed medication even if your symptoms are improving after a few doses.  Taking all of your medicine helps prevent the symptoms from returning.     If your symptoms worsen, you develop pain in your back or stomach, develop fevers, or are not improving in 5 days, please contact your primary care provider for an appointment or visit any of our convenient Walk-in or Urgent Care Centers to be seen, which can be found on our website here.    Thanks again for choosing us as your health care partner,    Josh Boswell MD

## 2025-03-19 ENCOUNTER — E-VISIT (OUTPATIENT)
Dept: URGENT CARE | Facility: CLINIC | Age: 55
End: 2025-03-19
Payer: COMMERCIAL

## 2025-03-19 DIAGNOSIS — N39.0 ACUTE UTI: Primary | ICD-10-CM

## 2025-03-19 DIAGNOSIS — R30.0 DIFFICULT OR PAINFUL URINATION: ICD-10-CM

## 2025-03-19 NOTE — PATIENT INSTRUCTIONS
Dear Sharron Shipley,     After reviewing your responses, I would like you to come in for a urine test to make sure we treat you correctly. This urine test is to evaluate you for a possible urinary tract infection, and should be scheduled for today or tomorrow. Schedule a Lab Only appointment here.     Lab appointments are not available at most locations on the weekends. If no Lab Only appointment is available, you should be seen in any of our convenient Walk-in or Urgent Care Centers, which can be found on our website here.     You will receive instructions with your results in [employer] once they are available.     If your symptoms worsen, you develop pain in your back or stomach, develop fevers, or are not improving in 5 days, please contact your primary care provider for an appointment or visit a Walk-in or Urgent Care Center to be seen.     Thanks again for choosing us as your health care partner,     Tona Connor, CNP

## 2025-03-20 ENCOUNTER — LAB (OUTPATIENT)
Dept: LAB | Facility: CLINIC | Age: 55
End: 2025-03-20
Payer: COMMERCIAL

## 2025-03-20 DIAGNOSIS — R30.0 DIFFICULT OR PAINFUL URINATION: ICD-10-CM

## 2025-03-20 LAB
ALBUMIN UR-MCNC: 100 MG/DL
APPEARANCE UR: CLEAR
BACTERIA #/AREA URNS HPF: ABNORMAL /HPF
BILIRUB UR QL STRIP: NEGATIVE
CLUE CELLS: ABNORMAL
COLOR UR AUTO: YELLOW
GLUCOSE UR STRIP-MCNC: NEGATIVE MG/DL
HGB UR QL STRIP: ABNORMAL
KETONES UR STRIP-MCNC: NEGATIVE MG/DL
LEUKOCYTE ESTERASE UR QL STRIP: ABNORMAL
MUCOUS THREADS #/AREA URNS LPF: PRESENT /LPF
NITRATE UR QL: POSITIVE
PH UR STRIP: 6.5 [PH] (ref 5–7)
RBC #/AREA URNS AUTO: ABNORMAL /HPF
SP GR UR STRIP: 1.02 (ref 1–1.03)
SQUAMOUS #/AREA URNS AUTO: ABNORMAL /LPF
TRICHOMONAS, WET PREP: ABNORMAL
UROBILINOGEN UR STRIP-ACNC: 2 E.U./DL
WBC #/AREA URNS AUTO: ABNORMAL /HPF
WBC CLUMPS #/AREA URNS HPF: PRESENT /HPF
WBC'S/HIGH POWER FIELD, WET PREP: ABNORMAL
YEAST, WET PREP: ABNORMAL

## 2025-03-20 RX ORDER — CEPHALEXIN 500 MG/1
500 CAPSULE ORAL 2 TIMES DAILY
Qty: 14 CAPSULE | Refills: 0 | Status: SHIPPED | OUTPATIENT
Start: 2025-03-20 | End: 2025-03-27

## 2025-04-15 ENCOUNTER — PRE VISIT (OUTPATIENT)
Dept: GASTROENTEROLOGY | Facility: CLINIC | Age: 55
End: 2025-04-15

## 2025-04-15 ENCOUNTER — VIRTUAL VISIT (OUTPATIENT)
Dept: GASTROENTEROLOGY | Facility: CLINIC | Age: 55
End: 2025-04-15
Attending: STUDENT IN AN ORGANIZED HEALTH CARE EDUCATION/TRAINING PROGRAM
Payer: COMMERCIAL

## 2025-04-15 DIAGNOSIS — K22.2 SCHATZKI'S RING: ICD-10-CM

## 2025-04-15 DIAGNOSIS — R11.10 REGURGITATION OF FOOD: ICD-10-CM

## 2025-04-15 DIAGNOSIS — R12 HEARTBURN: ICD-10-CM

## 2025-04-15 DIAGNOSIS — R07.89 NON-CARDIAC CHEST PAIN: ICD-10-CM

## 2025-04-15 DIAGNOSIS — R13.19 ESOPHAGEAL DYSPHAGIA: Primary | ICD-10-CM

## 2025-04-15 NOTE — PROGRESS NOTES
Virtual Visit Details    Type of service:  Video Visit     Originating Location (pt. Location): Home    Distant Location (provider location):  On-site  Platform used for Video Visit: [facility]    Gastroenterology Visit for: Sharron Shipley 1970   MRN: [id number]     Reason for Visit:  chief complaint    Referred by: Terrell  / Chito WALDO [facility] [address] / [place] [address]  Patient Care Team:  Michael Tejada DO as PCP - General (Family Medicine)  Michael Tejada DO as Assigned PCP  El Osei MD as Assigned Musculoskeletal Provider  Alan Burgos DO as Physician (Gastroenterology)    History of Present Illness:   Sharron Shipley is a 54 year old female with afib, dysphagia who is presenting as a follow up  patient in consultation at the request of Dr. Tejada with a chief complaint of dysphagia, regurgitation.  ---------------------------------------------------------------  4.15.25  Sharron Shipley states when she eats meat, chinese food, and feels like the food gets stuck at the bottom of the esophagus. This is associated with pain. She then regurgitates. She takes omeprazole at night before bed. She tries to eliminate different foods. Dairy queen ice cream triggers the symptoms. She feels that the symptoms are the same as what she experienced in the past for which she had an endoscopy. She feels that this has been worsening but the symptoms are unchanged. Takes omeprazole at that time and feels that it helps calm the symptoms. Omeprazole at night before bed. Patient denies significant heartburn but the non cardiac chest pain and dysphagia is all related. Previous dilations have helped for a couple months and then symptoms return.    See updated BEDQ and Eckardt score below: These patient reported outcomes are pertinent to the HPI and have personally been reviewed.    ---------------------------------------------------------------     Wt Readings from Last 5 Encounters:   02/13/25  76.8 kg (169 lb 4.8 oz)   02/05/25 74.8 kg (165 lb)   11/19/24 76.7 kg (169 lb)   10/31/24 76.8 kg (169 lb 4.8 oz)   03/21/24 69.6 kg (153 lb 8 oz)        Esophageal Questionnaire(s)    BEDQ Questionnaire      4/10/2025     8:42 AM   BEDQ Questionnaire: How Often Have You Had the Following?   Trouble eating solid food (meat, bread, vegetables) 2   Trouble eating soft foods (yogurt, jello, pudding) 0   Trouble swallowing liquids 0   Pain while swallowing 3   Coughing or choking while swallowing foods or liquids 0   Total Score: 5        Patient-reported         4/10/2025     8:42 AM   BEDQ Questionnaire: Discomfort/Pain Ratings   Eating solid food (meat, bread, vegetables) 5   Eating soft foods (yogurt, jello, pudding) 1   Drinking liquid 1   Total Score: 7        Patient-reported       Eckardt Questionnaire      4/10/2025     8:47 AM   Eckardt Questionnaire   Dysphagia 3   Regurgitation 3   Retrosternal Pain 3   Weight Loss (kg) 0   Total Score:  9        Patient-reported       Promis 10 Questionnaire      4/10/2025     8:48 AM   PROMIS 10 FLOWSHEET DATA   In general, would you say your health is: 3   In general, would you say your quality of life is: 4   In general, how would you rate your physical health? 4   In general, how would you rate your mental health, including your mood and your ability to think? 5   In general, how would you rate your satisfaction with your social activities and relationships? 5   In general, please rate how well you carry out your usual social activities and roles. (This includes activities at home, at work and in your community, and responsibilities as a parent, child, spouse, employee, friend, etc.) 5   To what extent are you able to carry out your everyday physical activities such as walking, climbing stairs, carrying groceries, or moving a chair? 5   In the past 7 days, how often have you been bothered by emotional problems such as feeling anxious, depressed, or irritable? 2   In  the past 7 days, how would you rate your fatigue on average? 2   In the past 7 days, how would you rate your pain on average, where 0 means no pain, and 10 means worst imaginable pain? 4   Mental health question re-calculation - no clinical value 4    Physical health question re-calculation - no clinical value 4    Pain question re-calculation - no clinical value 3    Global Mental Health Score 18    Global Physical Health Score 16    PROMIS TOTAL - SUBSCORES 34        Patient-reported           STUDIES & PROCEDURES:    EGD:   Date: 4/25/23  Impression:  The Z-line was regular and was found 38 cm from the incisors.        The examined esophagus was normal. Adult endoscope easily passed through        the esophagus and GE junction. Empiric dilation was done given past        response. A guidewire was placed and the scope was withdrawn. Dilation        was performed with a Savary dilator with no resistance at 18 mm. The        dilation site was examined following endoscope reinsertion and showed no        change. Four biopsies were obtained with cold forceps for histology in 4        quadrants in the proximal esophagus and in the distal esophagus to        evaluate for eosinophilic esophagitis.        The entire examined stomach was normal.        The examined duodenum was normal.                                                                                    Impression:            - Z-line regular, 38 cm from the incisors.                          - Normal esophagus. Dilated.                          - Normal stomach.                          - Normal examined duodenum.                          - Four quadrant biopsies were obtained in the proximal                          esophagus and in the distal esophagus.   Pathology Report:  A. Distal Esophageal,  Biopsies:  - Squamous mucosa with reactive changes  - No evidence of eosinophilic esophagitis      B. Proximal Esophageal, Biopsies:  - Squamous mucosa with no  significant histopathologic abnormality  - No evidence of eosinophilic esophagitis     EGD 5/10/21  Findings:       The nasopharynx and oropharynx were normal.        A mild Schatzki ring was found at the lower esophageal sphincter. A TTS        dilator was passed through the scope. Dilation with a 15-16.5-18 mm        balloon dilator was performed to 18 mm.        The exam of the esophagus was otherwise normal.        The entire examined stomach was normal.        There is no endoscopic evidence of erythema, erythema or inflammatory        changes suggestive of gastritis or ulceration in the stomach.        The examined duodenum was normal.                                                                                    Impression:          - Normal nasopharynx and oropharynx.                        - Mild Schatzki ring. Dilated.                        - Normal stomach.                        - Normal examined duodenum.                        - No specimens collected.     Colonoscopy:  Date:  Impression:  Pathology Report:     EndoFLIP directed at the UES or LES (8cm (EF-325) balloon length or 16cm (EF-322) balloon length):   Date:  8cm balloon  Balloon inflation Balloon pressure CSA (mm^2) DI (mm^2/mmHg) Dmin (mm) Compliance   20 (ladmark ID)        30        40        50           16cm balloon  Balloon inflation Balloon pressure CSA (mm^2) DI (mm^2/mmHg) Dmin (mm) Compliance   30 (ladmark ID)        40        50        60        70           High Resolution Manometry:  Date:  Impression:    PH/Impedance:  Date:  Impression:     Bravo:  48 or 96hr  Date:  Impression:    CT:  Date:  Impression:    Esophagram:  Date:  Impression:    Modified Barium Esophagram:  Date:  Impression:     Prior medical records were reviewed including, but not limited to, notes from referring providers, lab work, radiographic tests, and other diagnostic tests. Pertinent results were summarized above.     History     Past Medical  History:   Diagnosis Date    Cervical high risk HPV (human papillomavirus) test positive 2020 2021    High cholesterol     Kidney stone     PONV (postoperative nausea and vomiting)     Ventricular tachycardia (H) 2/5/2025       Past Surgical History:   Procedure Laterality Date    BIOPSY      ESOPHAGOSCOPY, GASTROSCOPY, DUODENOSCOPY (EGD), DILATATION, COMBINED N/A 05/10/2021    Procedure: ESOPHAGOGASTRODUODENOSCOPY, WITH DILATION of lower esophageal sphincter;  Surgeon: Valentin Taylor MD;  Location: [facility]    GI SURGERY      HYSTERECTOMY VAGINAL, BILATERAL SALPINGO-OOPHERECTOMY, COMBINED Bilateral 06/07/2022    Procedure: Transvaginal hysterectomy, bilateral salpingo-oophorectomy, cystoscopy ,Excision of vulvar Skin Tag;  Surgeon: Trini Schultz MD;  Location: [facility]       Social History     Socioeconomic History    Marital status: Single     Spouse name: Not on file    Number of children: 4    Years of education: Not on file    Highest education level: Not on file   Occupational History    Occupation: Manager     Comment: [employer]   Tobacco Use    Smoking status: Never     Passive exposure: Never    Smokeless tobacco: Never   Vaping Use    Vaping status: Never Used   Substance and Sexual Activity    Alcohol use: Not Currently     Comment: rare    Drug use: Never    Sexual activity: Yes     Partners: Male     Birth control/protection: Post-menopausal   Other Topics Concern    Parent/sibling w/ CABG, MI or angioplasty before 65F 55M? No   Social History Narrative    Currently living with parents to help take care of them.       Social Drivers of Health     Financial Resource Strain: Low Risk  (10/26/2024)    Financial Resource Strain     Within the past 12 months, have you or your family members you live with been unable to get utilities (heat, electricity) when it was really needed?: No   Food Insecurity: Low Risk  (10/26/2024)    Food Insecurity     Within the past 12 months, did you worry that your  food would run out before you got money to buy more?: No     Within the past 12 months, did the food you bought just not last and you didn t have money to get more?: No   Transportation Needs: Low Risk  (10/26/2024)    Transportation Needs     Within the past 12 months, has lack of transportation kept you from medical appointments, getting your medicines, non-medical meetings or appointments, work, or from getting things that you need?: No   Physical Activity: Insufficiently Active (10/26/2024)    Exercise Vital Sign     Days of Exercise per Week: 3 days     Minutes of Exercise per Session: 10 min   Stress: No Stress Concern Present (10/26/2024)    [place] [employer] - Occupational Stress Questionnaire     Feeling of Stress : Not at all   Social Connections: Unknown (10/26/2024)    Social Connection and Isolation Panel [NHANES]     Frequency of Communication with Friends and Family: Not on file     Frequency of Social Gatherings with Friends and Family: More than three times a week     Attends [employer] Services: Not on file     Active Member of Clubs or Organizations: Not on file     Attends Club or Organization Meetings: Not on file     Marital Status: Not on file   Interpersonal Safety: Low Risk  (10/31/2024)    Interpersonal Safety     Do you feel physically and emotionally safe where you currently live?: Yes     Within the past 12 months, have you been hit, slapped, kicked or otherwise physically hurt by someone?: No     Within the past 12 months, have you been humiliated or emotionally abused in other ways by your partner or ex-partner?: No   Housing Stability: Low Risk  (10/26/2024)    Housing Stability     Do you have housing? : Yes     Are you worried about losing your housing?: No       Family History   Problem Relation Age of Onset    Diabetes Mother     Hyperlipidemia Mother     Sleep Apnea Father     Thyroid Disease Father     Diabetes Son     Attention Deficit Disorder Son      Attention Deficit Disorder Daughter     Diabetes Son     Thyroid Disease Daughter      Family history reviewed and edited as appropriate    Medications and Allergies:     Outpatient Encounter Medications as of 4/15/2025   Medication Sig Dispense Refill    atorvastatin (LIPITOR) 20 MG tablet Take 1 tablet (20 mg) by mouth daily. 90 tablet 4    diltiazem ER COATED BEADS (CARDIZEM CD/CARTIA XT) 120 MG 24 hr capsule Take 1 capsule (120 mg) by mouth daily. 90 capsule 3    estradiol (VIVELLE-DOT) 0.1 MG/24HR bi-weekly patch Place 1 patch onto the skin twice a week. 24 patch 4    omeprazole (PRILOSEC) 20 MG DR capsule Take 2 capsules (40 mg) by mouth every evening.      omeprazole (PRILOSEC) 20 MG DR capsule Take 20 mg by mouth daily as needed.      oxyBUTYnin ER (DITROPAN XL) 5 MG 24 hr tablet Take 1 tablet (5 mg) by mouth daily. 90 tablet 4    valACYclovir (VALTREX) 1000 mg tablet Take 1 tablet (1,000 mg) by mouth daily. 90 tablet 4    valACYclovir (VALTREX) 500 MG tablet Take 1 tablet daily by mouth as needed for flares 30 tablet 3     No facility-administered encounter medications on file as of 4/15/2025.        No Known Allergies     Review of systems:  A full 10 point review of systems was obtained and was negative except for the pertinent positives and negatives stated within the HPI.    Objective Findings:   Physical Exam:    Constitutional: LMP  (LMP Unknown)   General: Alert, cooperative, no distress, well-appearing  Head: Atraumatic, normocephalic, no obvious abnormalities   Eyes: EOMI, Sclera anicteric, no obvious conjunctival hemorrhage   Nose: Nares normal, no obvious malformation, no obvious rhinorrhea   Respiratory: normal appearing, no cough  Musculoskeletal: Range of motion intact, no obvious strength deficit  Skin: No jaundice, no obvious rash  Neurologic: AAOx3, no obvious neurologic abnormality  Psychiatric: Normal Affect, appropriate mood  Extremities: No obvious edema, no obvious  malformation     Labs, Radiology, Pathology     Lab Results   Component Value Date    WBC 5.0 02/13/2025    WBC 7.2 02/05/2025    WBC 3.8 (L) 09/06/2023    HGB 12.8 02/13/2025    HGB 14.7 02/05/2025    HGB 13.0 09/06/2023     02/13/2025     02/05/2025     09/06/2023    CHOL 148 10/31/2024    CHOL 204 (H) 03/21/2024    CHOL 184 09/06/2023    TRIG 97 10/31/2024    TRIG 125 03/21/2024    TRIG 123 09/06/2023    HDL 49 (L) 10/31/2024    HDL 53 03/21/2024    HDL 47 (L) 09/06/2023    ALT 14 02/05/2025    ALT 18 10/09/2023    ALT 78 (H) 09/06/2023    AST 17 02/05/2025    AST 29 10/09/2023    AST 76 (H) 09/06/2023     02/13/2025     02/06/2025     02/05/2025    BUN 8.4 02/13/2025    BUN 9.1 02/06/2025    BUN 9.6 02/05/2025    CO2 28 02/13/2025    CO2 24 02/06/2025    CO2 25 02/05/2025    TSH 6.60 (H) 02/05/2025    TSH 2.41 09/06/2023    TSH 2.40 09/23/2020        Liver Function Studies -   Recent Labs   Lab Test 02/05/25 1942   PROTTOTAL 7.1   ALBUMIN 4.4   BILITOTAL 0.4   ALKPHOS 86   AST 17   ALT 14          Patient Active Problem List    Diagnosis Date Noted    Esophageal dysphagia 04/15/2025     Priority: Medium    Regurgitation of food 04/15/2025     Priority: Medium    Non-cardiac chest pain 04/15/2025     Priority: Medium    Ventricular tachycardia (H) 02/05/2025     Priority: Medium    Atrial fibrillation with rapid ventricular response (H) 02/05/2025     Priority: Medium    Vaginal dryness, menopausal 10/31/2024     Priority: Medium    Schatzki's ring 10/31/2024     Priority: Medium     Hx esophageal dilation, last was 2023      Female stress incontinence 04/15/2024     Priority: Medium    Urge incontinence of urine 04/15/2024     Priority: Medium    Symptomatic menopausal or female climacteric states 03/21/2024     Priority: Medium    Heartburn 01/30/2023     Priority: Medium    H/O total hysterectomy 01/30/2023     Priority: Medium    High cholesterol 06/03/2022      Priority: Medium    Cervical high risk HPV (human papillomavirus) test positive 09/30/2020     Priority: Medium     2006, 2007, 2008, 2012 NIL Paps (Care Everywhere)  2017 NIL Pap, Neg HPV (Care Everywhere)  9/23/20 NIL Pap, + HR HPV (neg 16/18). Cotest due in 1 year.   10/8/21 NIL Pap, + HR HPV (neg 16/18). Roulette due by 1/8/2022.    3/3/22 Colpo bx SHAWN I, focal SHAWN II-III. Plan: LEEP due before 6/3/22  4/25/22 LEEP SHAWN I with positive margins, ECC neg. Plan: hyst  6/7/22 Hyst - cervix benign. Plan: cotest in 1 year  9/6/23 NIL vaginal pap, Neg HPV. Plan cotest in 1 year due 09/6/24  10/31/24 NIL vaginal pap, Neg HPV. 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 Tejada with a chief complaint of dysphagia, regurgitation.    The patient has symptoms of dysphagia that are chronic but are occurring with increased frequency. It is unclear if there is a primary motility process vs structural issue with a noted previous schatzki's ring or if the symptoms are being driven by reflux. I have recommended a timed barium esophagram with tablet as well as endoscopy off therapy and wireless pH off therapy. Future testing could include high resolution esophageal manometry. There is the possibility of wireless pH study on or off therapy depending on the results of the other testing    1. Esophageal dysphagia    2. Schatzki's ring    3. Heartburn    4. Regurgitation of food    5. Non-cardiac chest pain       Orders Placed This  Encounter   Procedures    XR Esophagram    Adult GI  Referral - Procedure Only      Plan:  Please schedule timed barium esophagram to evaluate your difficulty with swallowing  Please schedule endoscopy with wireless pH study   Future directions could include high resolution esophageal manometry and possibly 24hr pH impedance    Follow up plan:   Return to clinic 6 months and as needed.    The risks and benefits of my recommendations, as well as other treatment options were discussed with the patient and any available family today. All questions were answered.     Follow up: As planned above. 40 minutes was spent on the date of the encounter doing (chart review/review of outside records/review of test results/interpretation of tests/patient visit/documentation/discussion with other provider(s)/discussion with family/and coordinating care).    The patient verbalized understanding of the plan and was appreciative for the time spent and information provided during the office visit.     Author:   Alan Burgos DO   of Medicine  Director, Esophageal Disorders Program  Director of Endoscopy, [facility]  Division of Gastroenterology, Hepatology, and Nutrition  [facility]    Dr. Burgos speaks for [employer] regarding dupilumab and has participated in advisory boards for the medication. He receives income for these activities. When discussing the medication, patients were informed of Dr. Burgos's role and potential conflict of interest. All questions and/or concerns were answered during the encounter.    Dr. Burgos speaks for [employer] regarding vonoprazan. He receives income for these activities. When discussing the medication, patients were informed of Dr. Burgos's role and potential conflict of interest. All questions and/or concerns were answered during the encounter.     Documentation assisted by voice recognition and documentation  system.

## 2025-04-15 NOTE — LETTER
4/15/2025      Sharron Shipley  [address]  [facility] [address]      Dear Colleague,    Thank you for referring your patient, Sharron Shipley, to the [facility] [place]. Please see a copy of my visit note below.    Virtual Visit Details    Type of service:  Video Visit     Originating Location (pt. Location): Home    Distant Location (provider location):  On-site  Platform used for Video Visit: [facility]    Gastroenterology Visit for: Sharron Shipley 1970   MRN: [id number]     Reason for Visit:  chief complaint    Referred by: Terrell  / Chito [facility] [address] / [place] [address]  Patient Care Team:  Michael Tejada DO as PCP - General (Family Medicine)  Michael Tejada DO as Assigned PCP  El Osei MD as Assigned Musculoskeletal Provider  Alan Burgos DO as Physician (Gastroenterology)    History of Present Illness:   Sharron Shipley is a 54 year old female with afib, dysphagia who is presenting as a follow up  patient in consultation at the request of Dr. Tejada with a chief complaint of dysphagia, regurgitation.  ---------------------------------------------------------------  4.15.25  Sharron Shipley states when she eats meat, chinese food, and feels like the food gets stuck at the bottom of the esophagus. This is associated with pain. She then regurgitates. She takes omeprazole at night before bed. She tries to eliminate different foods. Dairy queen ice cream triggers the symptoms. She feels that the symptoms are the same as what she experienced in the past for which she had an endoscopy. She feels that this has been worsening but the symptoms are unchanged. Takes omeprazole at that time and feels that it helps calm the symptoms. Omeprazole at night before bed. Patient denies significant heartburn but the non cardiac chest pain and dysphagia is all related. Previous dilations have helped for a couple months and then symptoms  return.    See updated BEDQ and Eckardt score below: These patient reported outcomes are pertinent to the HPI and have personally been reviewed.    ---------------------------------------------------------------     Wt Readings from Last 5 Encounters:   02/13/25 76.8 kg (169 lb 4.8 oz)   02/05/25 74.8 kg (165 lb)   11/19/24 76.7 kg (169 lb)   10/31/24 76.8 kg (169 lb 4.8 oz)   03/21/24 69.6 kg (153 lb 8 oz)        Esophageal Questionnaire(s)    BEDQ Questionnaire      4/10/2025     8:42 AM   BEDQ Questionnaire: How Often Have You Had the Following?   Trouble eating solid food (meat, bread, vegetables) 2   Trouble eating soft foods (yogurt, jello, pudding) 0   Trouble swallowing liquids 0   Pain while swallowing 3   Coughing or choking while swallowing foods or liquids 0   Total Score: 5        Patient-reported         4/10/2025     8:42 AM   BEDQ Questionnaire: Discomfort/Pain Ratings   Eating solid food (meat, bread, vegetables) 5   Eating soft foods (yogurt, jello, pudding) 1   Drinking liquid 1   Total Score: 7        Patient-reported       Eckardt Questionnaire      4/10/2025     8:47 AM   Eckardt Questionnaire   Dysphagia 3   Regurgitation 3   Retrosternal Pain 3   Weight Loss (kg) 0   Total Score:  9        Patient-reported       Promis 10 Questionnaire      4/10/2025     8:48 AM   PROMIS 10 FLOWSHEET DATA   In general, would you say your health is: 3   In general, would you say your quality of life is: 4   In general, how would you rate your physical health? 4   In general, how would you rate your mental health, including your mood and your ability to think? 5   In general, how would you rate your satisfaction with your social activities and relationships? 5   In general, please rate how well you carry out your usual social activities and roles. (This includes activities at home, at work and in your community, and responsibilities as a parent, child, spouse, employee, friend, etc.) 5   To what extent are you  able to carry out your everyday physical activities such as walking, climbing stairs, carrying groceries, or moving a chair? 5   In the past 7 days, how often have you been bothered by emotional problems such as feeling anxious, depressed, or irritable? 2   In the past 7 days, how would you rate your fatigue on average? 2   In the past 7 days, how would you rate your pain on average, where 0 means no pain, and 10 means worst imaginable pain? 4   Mental health question re-calculation - no clinical value 4    Physical health question re-calculation - no clinical value 4    Pain question re-calculation - no clinical value 3    Global Mental Health Score 18    Global Physical Health Score 16    PROMIS TOTAL - SUBSCORES 34        Patient-reported           STUDIES & PROCEDURES:    EGD:   Date: 4/25/23  Impression:  The Z-line was regular and was found 38 cm from the incisors.        The examined esophagus was normal. Adult endoscope easily passed through        the esophagus and GE junction. Empiric dilation was done given past        response. A guidewire was placed and the scope was withdrawn. Dilation        was performed with a Savary dilator with no resistance at 18 mm. The        dilation site was examined following endoscope reinsertion and showed no        change. Four biopsies were obtained with cold forceps for histology in 4        quadrants in the proximal esophagus and in the distal esophagus to        evaluate for eosinophilic esophagitis.        The entire examined stomach was normal.        The examined duodenum was normal.                                                                                    Impression:            - Z-line regular, 38 cm from the incisors.                          - Normal esophagus. Dilated.                          - Normal stomach.                          - Normal examined duodenum.                          - Four quadrant biopsies were obtained in the proximal                           esophagus and in the distal esophagus.   Pathology Report:  A. Distal Esophageal,  Biopsies:  - Squamous mucosa with reactive changes  - No evidence of eosinophilic esophagitis      B. Proximal Esophageal, Biopsies:  - Squamous mucosa with no significant histopathologic abnormality  - No evidence of eosinophilic esophagitis     EGD 5/10/21  Findings:       The nasopharynx and oropharynx were normal.        A mild Schatzki ring was found at the lower esophageal sphincter. A TTS        dilator was passed through the scope. Dilation with a 15-16.5-18 mm        balloon dilator was performed to 18 mm.        The exam of the esophagus was otherwise normal.        The entire examined stomach was normal.        There is no endoscopic evidence of erythema, erythema or inflammatory        changes suggestive of gastritis or ulceration in the stomach.        The examined duodenum was normal.                                                                                    Impression:          - Normal nasopharynx and oropharynx.                        - Mild Schatzki ring. Dilated.                        - Normal stomach.                        - Normal examined duodenum.                        - No specimens collected.     Colonoscopy:  Date:  Impression:  Pathology Report:     EndoFLIP directed at the UES or LES (8cm (EF-325) balloon length or 16cm (EF-322) balloon length):   Date:  8cm balloon  Balloon inflation Balloon pressure CSA (mm^2) DI (mm^2/mmHg) Dmin (mm) Compliance   20 (ladmark ID)        30        40        50           16cm balloon  Balloon inflation Balloon pressure CSA (mm^2) DI (mm^2/mmHg) Dmin (mm) Compliance   30 (ladmark ID)        40        50        60        70           High Resolution Manometry:  Date:  Impression:    PH/Impedance:  Date:  Impression:     Bravo:  48 or 96hr  Date:  Impression:    CT:  Date:  Impression:    Esophagram:  Date:  Impression:    Modified Barium  Esophagram:  Date:  Impression:     Prior medical records were reviewed including, but not limited to, notes from referring providers, lab work, radiographic tests, and other diagnostic tests. Pertinent results were summarized above.     History     Past Medical History:   Diagnosis Date     Cervical high risk HPV (human papillomavirus) test positive 2020 2021     High cholesterol      Kidney stone      PONV (postoperative nausea and vomiting)      Ventricular tachycardia (H) 2/5/2025       Past Surgical History:   Procedure Laterality Date     BIOPSY       ESOPHAGOSCOPY, GASTROSCOPY, DUODENOSCOPY (EGD), DILATATION, COMBINED N/A 05/10/2021    Procedure: ESOPHAGOGASTRODUODENOSCOPY, WITH DILATION of lower esophageal sphincter;  Surgeon: Valentin Taylor MD;  Location: [facility]     GI SURGERY       HYSTERECTOMY VAGINAL, BILATERAL SALPINGO-OOPHERECTOMY, COMBINED Bilateral 06/07/2022    Procedure: Transvaginal hysterectomy, bilateral salpingo-oophorectomy, cystoscopy ,Excision of vulvar Skin Tag;  Surgeon: Trini Schultz MD;  Location: [facility]       Social History     Socioeconomic History     Marital status: Single     Spouse name: Not on file     Number of children: 4     Years of education: Not on file     Highest education level: Not on file   Occupational History     Occupation: Manager     Comment: [employer]   Tobacco Use     Smoking status: Never     Passive exposure: Never     Smokeless tobacco: Never   Vaping Use     Vaping status: Never Used   Substance and Sexual Activity     Alcohol use: Not Currently     Comment: rare     Drug use: Never     Sexual activity: Yes     Partners: Male     Birth control/protection: Post-menopausal   Other Topics Concern     Parent/sibling w/ CABG, MI or angioplasty before 65F 55M? No   Social History Narrative    Currently living with parents to help take care of them.       Social Drivers of Health     Financial Resource Strain: Low Risk  (10/26/2024)    Financial  Resource Strain      Within the past 12 months, have you or your family members you live with been unable to get utilities (heat, electricity) when it was really needed?: No   Food Insecurity: Low Risk  (10/26/2024)    Food Insecurity      Within the past 12 months, did you worry that your food would run out before you got money to buy more?: No      Within the past 12 months, did the food you bought just not last and you didn t have money to get more?: No   Transportation Needs: Low Risk  (10/26/2024)    Transportation Needs      Within the past 12 months, has lack of transportation kept you from medical appointments, getting your medicines, non-medical meetings or appointments, work, or from getting things that you need?: No   Physical Activity: Insufficiently Active (10/26/2024)    Exercise Vital Sign      Days of Exercise per Week: 3 days      Minutes of Exercise per Session: 10 min   Stress: No Stress Concern Present (10/26/2024)    [employer] [place] [employer] - Occupational Stress Questionnaire      Feeling of Stress : Not at all   Social Connections: Unknown (10/26/2024)    Social Connection and Isolation Panel [NHANES]      Frequency of Communication with Friends and Family: Not on file      Frequency of Social Gatherings with Friends and Family: More than three times a week      Attends Temple Services: Not on file      Active Member of Clubs or Organizations: Not on file      Attends Club or Organization Meetings: Not on file      Marital Status: Not on file   Interpersonal Safety: Low Risk  (10/31/2024)    Interpersonal Safety      Do you feel physically and emotionally safe where you currently live?: Yes      Within the past 12 months, have you been hit, slapped, kicked or otherwise physically hurt by someone?: No      Within the past 12 months, have you been humiliated or emotionally abused in other ways by your partner or ex-partner?: No   Housing Stability: Low Risk  (10/26/2024)     Housing Stability      Do you have housing? : Yes      Are you worried about losing your housing?: No       Family History   Problem Relation Age of Onset     Diabetes Mother      Hyperlipidemia Mother      Sleep Apnea Father      Thyroid Disease Father      Diabetes Son      Attention Deficit Disorder Son      Attention Deficit Disorder Daughter      Diabetes Son      Thyroid Disease Daughter      Family history reviewed and edited as appropriate    Medications and Allergies:     Outpatient Encounter Medications as of 4/15/2025   Medication Sig Dispense Refill     atorvastatin (LIPITOR) 20 MG tablet Take 1 tablet (20 mg) by mouth daily. 90 tablet 4     diltiazem ER COATED BEADS (CARDIZEM CD/CARTIA XT) 120 MG 24 hr capsule Take 1 capsule (120 mg) by mouth daily. 90 capsule 3     estradiol (VIVELLE-DOT) 0.1 MG/24HR bi-weekly patch Place 1 patch onto the skin twice a week. 24 patch 4     omeprazole (PRILOSEC) 20 MG DR capsule Take 2 capsules (40 mg) by mouth every evening.       omeprazole (PRILOSEC) 20 MG DR capsule Take 20 mg by mouth daily as needed.       oxyBUTYnin ER (DITROPAN XL) 5 MG 24 hr tablet Take 1 tablet (5 mg) by mouth daily. 90 tablet 4     valACYclovir (VALTREX) 1000 mg tablet Take 1 tablet (1,000 mg) by mouth daily. 90 tablet 4     valACYclovir (VALTREX) 500 MG tablet Take 1 tablet daily by mouth as needed for flares 30 tablet 3     No facility-administered encounter medications on file as of 4/15/2025.        No Known Allergies     Review of systems:  A full 10 point review of systems was obtained and was negative except for the pertinent positives and negatives stated within the HPI.    Objective Findings:   Physical Exam:    Constitutional: LMP  (LMP Unknown)   General: Alert, cooperative, no distress, well-appearing  Head: Atraumatic, normocephalic, no obvious abnormalities   Eyes: EOMI, Sclera anicteric, no obvious conjunctival hemorrhage   Nose: Nares normal, no obvious malformation, no obvious  rhinorrhea   Respiratory: normal appearing, no cough  Musculoskeletal: Range of motion intact, no obvious strength deficit  Skin: No jaundice, no obvious rash  Neurologic: AAOx3, no obvious neurologic abnormality  Psychiatric: Normal Affect, appropriate mood  Extremities: No obvious edema, no obvious malformation     Labs, Radiology, Pathology     Lab Results   Component Value Date    WBC 5.0 02/13/2025    WBC 7.2 02/05/2025    WBC 3.8 (L) 09/06/2023    HGB 12.8 02/13/2025    HGB 14.7 02/05/2025    HGB 13.0 09/06/2023     02/13/2025     02/05/2025     09/06/2023    CHOL 148 10/31/2024    CHOL 204 (H) 03/21/2024    CHOL 184 09/06/2023    TRIG 97 10/31/2024    TRIG 125 03/21/2024    TRIG 123 09/06/2023    HDL 49 (L) 10/31/2024    HDL 53 03/21/2024    HDL 47 (L) 09/06/2023    ALT 14 02/05/2025    ALT 18 10/09/2023    ALT 78 (H) 09/06/2023    AST 17 02/05/2025    AST 29 10/09/2023    AST 76 (H) 09/06/2023     02/13/2025     02/06/2025     02/05/2025    BUN 8.4 02/13/2025    BUN 9.1 02/06/2025    BUN 9.6 02/05/2025    CO2 28 02/13/2025    CO2 24 02/06/2025    CO2 25 02/05/2025    TSH 6.60 (H) 02/05/2025    TSH 2.41 09/06/2023    TSH 2.40 09/23/2020        Liver Function Studies -   Recent Labs   Lab Test 02/05/25 1942   PROTTOTAL 7.1   ALBUMIN 4.4   BILITOTAL 0.4   ALKPHOS 86   AST 17   ALT 14          Patient Active Problem List    Diagnosis Date Noted     Esophageal dysphagia 04/15/2025     Priority: Medium     Regurgitation of food 04/15/2025     Priority: Medium     Non-cardiac chest pain 04/15/2025     Priority: Medium     Ventricular tachycardia (H) 02/05/2025     Priority: Medium     Atrial fibrillation with rapid ventricular response (H) 02/05/2025     Priority: Medium     Vaginal dryness, menopausal 10/31/2024     Priority: Medium     Schatzki's ring 10/31/2024     Priority: Medium     Hx esophageal dilation, last was 2023       Female stress incontinence 04/15/2024      Priority: Medium     Urge incontinence of urine 04/15/2024     Priority: Medium     Symptomatic menopausal or female climacteric states 03/21/2024     Priority: Medium     Heartburn 01/30/2023     Priority: Medium     H/O total hysterectomy 01/30/2023     Priority: Medium     High cholesterol 06/03/2022     Priority: Medium     Cervical high risk HPV (human papillomavirus) test positive 09/30/2020     Priority: Medium     2006, 2007, 2008, 2012 NIL Paps (Care Everywhere)  2017 NIL Pap, Neg HPV (Care Everywhere)  9/23/20 NIL Pap, + HR HPV (neg 16/18). Cotest due in 1 year.   10/8/21 NIL Pap, + HR HPV (neg 16/18). [place] due by 1/8/2022.    3/3/22 Colpo bx SHAWN I, focal SHAWN II-III. Plan: LEEP due before 6/3/22  4/25/22 LEEP SHAWN I with positive margins, ECC neg. Plan: hyst  6/7/22 Hyst - cervix benign. Plan: cotest in 1 year  9/6/23 NIL vaginal pap, Neg HPV. Plan cotest in 1 year due 09/6/24  10/31/24 NIL vaginal pap, Neg HPV. 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 Tejada with a chief complaint of dysphagia, regurgitation.    The patient has symptoms of dysphagia that are chronic but are occurring with increased frequency. It is unclear if there is a primary motility process vs structural issue with a noted previous schatzki's ring or if the symptoms are being driven by reflux. I have recommended a timed barium esophagram with tablet as well as endoscopy off therapy and wireless pH off therapy. Future  testing could include high resolution esophageal manometry. There is the possibility of wireless pH study on or off therapy depending on the results of the other testing    1. Esophageal dysphagia    2. Schatzki's ring    3. Heartburn    4. Regurgitation of food    5. Non-cardiac chest pain       Orders Placed This Encounter   Procedures     XR Esophagram     Adult GI  Referral - Procedure Only      Plan:  Please schedule timed barium esophagram to evaluate your difficulty with swallowing  Please schedule endoscopy with wireless pH study   Future directions could include high resolution esophageal manometry and possibly 24hr pH impedance    Follow up plan:   Return to clinic 6 months and as needed.    The risks and benefits of my recommendations, as well as other treatment options were discussed with the patient and any available family today. All questions were answered.     Follow up: As planned above. 40 minutes was spent on the date of the encounter doing (chart review/review of outside records/review of test results/interpretation of tests/patient visit/documentation/discussion with other provider(s)/discussion with family/and coordinating care).    The patient verbalized understanding of the plan and was appreciative for the time spent and information provided during the office visit.     Author:   Alan Burgos DO   of Medicine  Director, Esophageal Disorders Program  Director of Endoscopy, [facility]  Division of Gastroenterology, Hepatology, and Nutrition  [facility]    Dr. Burgos speaks for [employer] regarding dupilumab and has participated in advisory boards for the medication. He receives income for these activities. When discussing the medication, patients were informed of Dr. Burgos's role and potential conflict of interest. All questions and/or concerns were answered during the encounter.    Dr. Burgos speaks for [facility]  [employer] regarding vonoprazan. He receives income for these activities. When discussing the medication, patients were informed of Dr. Burgos's role and potential conflict of interest. All questions and/or concerns were answered during the encounter.     Documentation assisted by voice recognition and documentation system.        Again, thank you for allowing me to participate in the care of your patient.        Sincerely,        Alan Burgos, DO    Electronically signed

## 2025-04-15 NOTE — PATIENT INSTRUCTIONS
It was a pleasure taking care of you today.  I've included a brief summary of our discussion and care plan from today's visit below.  Please review this information with your primary care provider.        Please schedule timed barium esophagram to evaluate your difficulty with swallowing  Please schedule endoscopy with wireless pH study   Future directions could include high resolution esophageal manometry and possibly 24hr pH impedance      If you feel you received exceptional care and are interested in supporting the clinical and research goals of Dr. Burgos or the Division of Gastroenterology, Hepatology, and Nutrition please contact him directly through [employer]  to discuss opportunities to donate.    Please call my nurse Ridge ([phone]) or Saundra ([phone]) with any questions or concerns.     Sincerely,    Alan Burgos DO   of Medicine  Director, Esophageal Disorders Program  Director of Endoscopy, [facility]  Division of Gastroenterology, Hepatology, and Nutrition  [facility]      Please see below for any additional questions and scheduling guidelines.    Sign up for [employer]: [employer] patient portal serves as a secure platform for accessing your medical records from the [facility]. Additionally, [employer] facilitates easy, timely, and secure messaging with your care team. If you have not signed up, you may do so by using the provided code or calling [phone].    Coordinating your care after your visit:  There are multiple options for scheduling your follow-up care based on your provider's recommendation.    How do I schedule a follow-up clinic appointment:   After your appointment, you may receive scheduling assistance with the Clinic Coordinators by having a seat in the waiting room and a Clinic Coordinator will call you up to schedule.  Virtual visits or after you leave the clinic:  Your provider has placed a follow-up order  in the [employer] portal for scheduling your return appointment. A member of the scheduling team will contact you to schedule.  [facility] Scheduling: Timely scheduling through [employer] is advised to ensure appointment availability.   Call to schedule: You may schedule your follow-up appointment(s) by calling [phone], option 1.    How do I schedule my endoscopy or colonoscopy procedure:  If a procedure, such as a colonoscopy or upper endoscopy was ordered by your provider, the scheduling team will contact you to schedule this procedure. Or you may choose to call to schedule at   [phone], option 2.  Please allow 20-30 minutes when scheduling a procedure.    How do I get my blood work done? To get your blood work done, you need to schedule a lab appointment at an [facility]. There are multiple ways to schedule:   At the clinic: The Clinic Coordinator you meet after your visit can help you schedule a lab appointment.   [employer] scheduling: [employer] offers online lab scheduling at all [facility] laboratory locations.   Call to schedule: You can call [phone] to schedule your lab appointment.    How do I schedule my imaging study: To schedule imaging studies, such as CT scans, ultrasounds, MRIs, or X-rays, contact Imaging Services at [phone].    How do I schedule a referral to another doctor: If your provider recommended a referral to another specialist(s), the referral order was placed by your provider. You will receive a phone call to schedule this referral, or you may choose to call the number attached to the referral to self-schedule.    For Post-Visit Question(s):  For any inquiries following today's visit:  Please utilize [employer] messaging and allow 48 hours for reply or contact the Call Center during normal business hours at [phone], option 3.  For Emergent After-hours questions, contact the On-Call GI Fellow through the [facility] at [phone].  In  addition, you may contact your Nurse directly using the provided contact information.    Test Results:  Test results will be accessible via [email or web address] in compliance with the 21st Century Cures Act. This means that your results will be available to you at the same time as your provider. Often you may see your results before your provider does. Results are reviewed by staff within two weeks with communication follow-up. Results may be released in the patient portal prior to your care team review.    Prescription Refill(s):  Medication prescribed by your provider will be addressed during your visit. For future refills, please coordinate with your pharmacy. If you have not had a recent clinic visit or routine labs, for your safety, your provider may not be able to refill your prescription.

## 2025-04-15 NOTE — NURSING NOTE
Current patient location: [address] [facility] [address]    Is the patient currently in the state of [place]? YES    Visit mode: VIDEO    If the visit is dropped, the patient can be reconnected by:VIDEO VISIT: Text to cell phone:   Telephone Information:   Mobile [phone]       Will anyone else be joining the visit? NO  (If patient encounters technical issues they should call [phone])    Are changes needed to the allergy or medication list? No    Are refills needed on medications prescribed by this physician? NO    Rooming Documentation:  Questionnaire(s) completed    Reason for visit: Consult    Catalina HINTONF

## 2025-04-27 NOTE — PROGRESS NOTES
General Cardiology Clinic-[place]                                                                                       HPI: Ms. Sharron Shipley is a 54 year old  female with PMH significant for  - Paroxysmal atrial fibrillation 2/6/2025    Patient presented to ER 2/5/2025 with presyncopal symptoms.  At the time she was driving.  Felt like she was going to pass out, pulled over and called 911.  EMS did an EKG.  She did not want to go by ambulance because of the bill.  She has history of hyperlipidemia on atorvastatin.  She presented to ER with shortness of breath, palpitations.  An hour prior to ER arrival she developed the symptoms including lightheadedness.  Her niece picked her up and drove her to ER.  Following the initial episode she had 6-7 additional episodes lasting for few minutes at a time.  Has never experienced this before.  No recent illness.  When she was in the ER EKG showed A-fib with RVR.  Heart rate in the low 200s.  She was started on diltiazem.  Lab work showed normal CBC, CMP, negative troponin.  TSH 6.6.  Patient admitted to cardiology service overnight.  Started on diltiazem 120 mg daily.  After discharge she wore Zio patch for 2 weeks which showed no recurrent A-fib.  Echocardiogram 2/6/2025 showed EF 60 to 65%, mild AI and normal atrial size.    Patient tells me that she still has palpitations every 1 or 2 weeks lasting for 5 to 10 minutes.  No shortness of breath, chest pain, syncope.  She is in sinus rhythm in clinic today.    Patient has no history of hypertension, diabetes, heart failure, valvular disease.  She does not smoke.  No alcohol abuse.  No recent weight gain.  No prior history of sleep apnea.    Medications, personal, family, and social history reviewed with patient and revised.    PAST MEDICAL HISTORY:  Past Medical History:   Diagnosis Date    Cervical high risk HPV (human  papillomavirus) test positive 2020 2021    High cholesterol     Kidney stone     PONV (postoperative nausea and vomiting)     Ventricular tachycardia (H) 2/5/2025       CURRENT MEDICATIONS:  Current Outpatient Medications   Medication Sig Dispense Refill    atorvastatin (LIPITOR) 20 MG tablet Take 1 tablet (20 mg) by mouth daily. 90 tablet 4    diltiazem ER COATED BEADS (CARDIZEM CD/CARTIA XT) 120 MG 24 hr capsule Take 1 capsule (120 mg) by mouth daily. 90 capsule 3    estradiol (VIVELLE-DOT) 0.1 MG/24HR bi-weekly patch Place 1 patch onto the skin twice a week. 24 patch 4    omeprazole (PRILOSEC) 20 MG DR capsule Take 2 capsules (40 mg) by mouth every evening.      omeprazole (PRILOSEC) 20 MG DR capsule Take 20 mg by mouth daily as needed.      oxyBUTYnin ER (DITROPAN XL) 5 MG 24 hr tablet Take 1 tablet (5 mg) by mouth daily. 90 tablet 4    valACYclovir (VALTREX) 1000 mg tablet Take 1 tablet (1,000 mg) by mouth daily. 90 tablet 4    valACYclovir (VALTREX) 500 MG tablet Take 1 tablet daily by mouth as needed for flares 30 tablet 3       PAST SURGICAL HISTORY:  Past Surgical History:   Procedure Laterality Date    BIOPSY      ESOPHAGOSCOPY, GASTROSCOPY, DUODENOSCOPY (EGD), DILATATION, COMBINED N/A 05/10/2021    Procedure: ESOPHAGOGASTRODUODENOSCOPY, WITH DILATION of lower esophageal sphincter;  Surgeon: Valentin Taylor MD;  Location: [facility]    GI SURGERY      HYSTERECTOMY VAGINAL, BILATERAL SALPINGO-OOPHERECTOMY, COMBINED Bilateral 06/07/2022    Procedure: Transvaginal hysterectomy, bilateral salpingo-oophorectomy, cystoscopy ,Excision of vulvar Skin Tag;  Surgeon: Trini Schultz MD;  Location: [facility]       ALLERGIES:   No Known Allergies    FAMILY HISTORY:  Family History   Problem Relation Age of Onset    Diabetes Mother     Hyperlipidemia Mother     Sleep Apnea Father     Thyroid Disease Father     Diabetes Son     Attention Deficit Disorder Son     Attention Deficit Disorder Daughter     Diabetes Son      Thyroid Disease Daughter          SOCIAL HISTORY:  Social History     Tobacco Use    Smoking status: Never     Passive exposure: Never    Smokeless tobacco: Never   Vaping Use    Vaping status: Never Used   Substance Use Topics    Alcohol use: Not Currently     Comment: rare    Drug use: Never       ROS:   Constitutional: No fever, chills, or sweats. Weight stable.   Cardiovascular: As per HPI.       Exam:  /80 (BP Location: Right arm, Patient Position: Chair, Cuff Size: Adult Regular)   Pulse 77   Wt 78 kg (172 lb)   LMP  (LMP Unknown)   SpO2 98%   BMI 28.62 kg/m    GENERAL APPEARANCE: alert and no distress  HEENT: no icterus, no central cyanosis  LYMPH/NECK: no adenopathy, no asymmetry, JVP not elevated, no carotid bruits.  CARDIOVASCULAR: regular rhythm, normal S1, S2, no S3 or S4 and no murmur, click or rub, precordium quiet with normal PMI.  GI: soft, non tender  EXTREMITIES: no edema  NEURO: alert, normal speech,and affect  SKIN: no ecchymoses, no rashes     I have reviewed the labs and personally reviewed the imaging below and made my comment in the assessment and plan.    Labs:  CBC RESULTS:   Lab Results   Component Value Date    WBC 5.0 02/13/2025    RBC 4.19 02/13/2025    HGB 12.8 02/13/2025    HCT 38.7 02/13/2025    MCV 92 02/13/2025    MCH 30.5 02/13/2025    MCHC 33.1 02/13/2025    RDW 12.7 02/13/2025     02/13/2025       BMP RESULTS:  Lab Results   Component Value Date     02/13/2025    POTASSIUM 4.4 02/13/2025    POTASSIUM 3.8 06/07/2022    CHLORIDE 106 02/13/2025    CHLORIDE 109 06/07/2022    CO2 28 02/13/2025    CO2 30 06/07/2022    ANIONGAP 7 02/13/2025    ANIONGAP 4 06/07/2022    GLC 98 02/13/2025     (H) 06/07/2022     (H) 09/23/2020    BUN 8.4 02/13/2025    BUN 15 06/07/2022    CR 0.76 02/13/2025    GFRESTIMATED >90 02/13/2025    AMISH 8.7 (L) 02/13/2025      Echocardiogram 2/6/2025    Left ventricular size, wall motion and function are normal. The  ejection  fraction is 60-65%.  Right ventricular function, chamber size, wall motion, and thickness are  normal.  Mild aortic insufficiency is present.  IVC diameter <2.1 cm collapsing >50% with sniff suggests a normal RA pressure  of 3 mmHg.  There is no prior study for direct comparison.    Zio patch 2/6/2025  Patient had a min HR of 53 bpm, max HR of 187 bpm, and avg HR of 81 bpm. Predominant underlying rhythm was Sinus Rhythm. 2 Supraventricular Tachycardia runs occurred, the run with the fastest interval lasting 5 beats with a max rate of 187 bpm, the longest lasting 7 beats with an avg rate of 112 bpm. Isolated SVEs were rare (<1.0%), SVE Couplets were rare (<1.0%), and SVE Triplets were rare (<1.0%). Isolated VEs were rare (<1.0%, 9093), VE Couplets were rare (<1.0%, 34), and no VE Triplets were present. Ventricular Trigeminy was present.           A-fib with RVR 2/5/2025        Assessment and Plan:     # Paroxysmal A-fib 2/6/2025 new diagnosis  -Since being on diltiazem over the last few months she is feeling less palpitations but still 5 to 10 min palpitations per patient every 1 or 2 weeks.  EKG today shows sinus rhythm.  She has no known risk factors for A-fib.    Plan  -Increase diltiazem from 120 mg to 180 mg daily.  -Patient will send me a [employer] message in 2-3 weeks to let me know about the effect of diltiazem.  If she is still having palpitations we will do a 1 month EKG monitor  - [id number] score is 0.  No anticoagulation.  - Sleep study to rule out sleep apnea  - Avoid alcohol  - Return to clinic 6 months    Total time spent today for this visit is 36 minutes including precharting, face-to-face clinic visit, review of labs/imaging and medical documentation.    Jacinto ARAIZA MD  [facility] Division of Cardiology    Securely message with [employer]

## 2025-04-28 ENCOUNTER — OFFICE VISIT (OUTPATIENT)
Dept: CARDIOLOGY | Facility: CLINIC | Age: 55
End: 2025-04-28
Payer: COMMERCIAL

## 2025-04-28 VITALS
BODY MASS INDEX: 28.62 KG/M2 | HEART RATE: 77 BPM | WEIGHT: 172 LBS | OXYGEN SATURATION: 98 % | DIASTOLIC BLOOD PRESSURE: 80 MMHG | SYSTOLIC BLOOD PRESSURE: 128 MMHG

## 2025-04-28 DIAGNOSIS — I48.0 PAF (PAROXYSMAL ATRIAL FIBRILLATION) (H): Primary | ICD-10-CM

## 2025-04-28 DIAGNOSIS — I48.91 ATRIAL FIBRILLATION WITH RAPID VENTRICULAR RESPONSE (H): ICD-10-CM

## 2025-04-28 PROCEDURE — 3079F DIAST BP 80-89 MM HG: CPT | Performed by: INTERNAL MEDICINE

## 2025-04-28 PROCEDURE — 3074F SYST BP LT 130 MM HG: CPT | Performed by: INTERNAL MEDICINE

## 2025-04-28 PROCEDURE — 99203 OFFICE O/P NEW LOW 30 MIN: CPT | Performed by: INTERNAL MEDICINE

## 2025-04-28 PROCEDURE — 93000 ELECTROCARDIOGRAM COMPLETE: CPT | Performed by: INTERNAL MEDICINE

## 2025-04-28 RX ORDER — DILTIAZEM HYDROCHLORIDE 180 MG/1
180 CAPSULE, COATED, EXTENDED RELEASE ORAL DAILY
Qty: 90 CAPSULE | Refills: 3 | Status: SHIPPED | OUTPATIENT
Start: 2025-04-28

## 2025-04-28 NOTE — LETTER
4/28/2025      RE: Sharron Shipley  [address]  [facility] [address]       Dear Colleague,    Thank you for the opportunity to participate in the care of your patient, Sharron Shipley, at the [facility]. Please see a copy of my visit note below.                                                                                             General Cardiology Clinic-[place]                                                                                       HPI: Ms. Sharron Shipley is a 54 year old  female with PMH significant for  - Paroxysmal atrial fibrillation 2/6/2025    Patient presented to ER 2/5/2025 with presyncopal symptoms.  At the time she was driving.  Felt like she was going to pass out, pulled over and called 911.  EMS did an EKG.  She did not want to go by ambulance because of the bill.  She has history of hyperlipidemia on atorvastatin.  She presented to ER with shortness of breath, palpitations.  An hour prior to ER arrival she developed the symptoms including lightheadedness.  Her niece picked her up and drove her to ER.  Following the initial episode she had 6-7 additional episodes lasting for few minutes at a time.  Has never experienced this before.  No recent illness.  When she was in the ER EKG showed A-fib with RVR.  Heart rate in the low 200s.  She was started on diltiazem.  Lab work showed normal CBC, CMP, negative troponin.  TSH 6.6.  Patient admitted to cardiology service overnight.  Started on diltiazem 120 mg daily.  After discharge she wore Zio patch for 2 weeks which showed no recurrent A-fib.  Echocardiogram 2/6/2025 showed EF 60 to 65%, mild AI and normal atrial size.    Patient tells me that she still has palpitations every 1 or 2 weeks lasting for 5 to 10 minutes.  No shortness of breath, chest pain, syncope.  She is in sinus rhythm in clinic today.    Patient has no history of  hypertension, diabetes, heart failure, valvular disease.  She does not smoke.  No alcohol abuse.  No recent weight gain.  No prior history of sleep apnea.    Medications, personal, family, and social history reviewed with patient and revised.    PAST MEDICAL HISTORY:  Past Medical History:   Diagnosis Date     Cervical high risk HPV (human papillomavirus) test positive 2020 2021     High cholesterol      Kidney stone      PONV (postoperative nausea and vomiting)      Ventricular tachycardia (H) 2/5/2025       CURRENT MEDICATIONS:  Current Outpatient Medications   Medication Sig Dispense Refill     atorvastatin (LIPITOR) 20 MG tablet Take 1 tablet (20 mg) by mouth daily. 90 tablet 4     diltiazem ER COATED BEADS (CARDIZEM CD/CARTIA XT) 120 MG 24 hr capsule Take 1 capsule (120 mg) by mouth daily. 90 capsule 3     estradiol (VIVELLE-DOT) 0.1 MG/24HR bi-weekly patch Place 1 patch onto the skin twice a week. 24 patch 4     omeprazole (PRILOSEC) 20 MG DR capsule Take 2 capsules (40 mg) by mouth every evening.       omeprazole (PRILOSEC) 20 MG DR capsule Take 20 mg by mouth daily as needed.       oxyBUTYnin ER (DITROPAN XL) 5 MG 24 hr tablet Take 1 tablet (5 mg) by mouth daily. 90 tablet 4     valACYclovir (VALTREX) 1000 mg tablet Take 1 tablet (1,000 mg) by mouth daily. 90 tablet 4     valACYclovir (VALTREX) 500 MG tablet Take 1 tablet daily by mouth as needed for flares 30 tablet 3       PAST SURGICAL HISTORY:  Past Surgical History:   Procedure Laterality Date     BIOPSY       ESOPHAGOSCOPY, GASTROSCOPY, DUODENOSCOPY (EGD), DILATATION, COMBINED N/A 05/10/2021    Procedure: ESOPHAGOGASTRODUODENOSCOPY, WITH DILATION of lower esophageal sphincter;  Surgeon: Valentin Taylor MD;  Location: [facility]     GI SURGERY       HYSTERECTOMY VAGINAL, BILATERAL SALPINGO-OOPHERECTOMY, COMBINED Bilateral 06/07/2022    Procedure: Transvaginal hysterectomy, bilateral salpingo-oophorectomy, cystoscopy ,Excision of vulvar Skin Tag;  Surgeon:  Trini Schultz MD;  Location: [facility]       ALLERGIES:   No Known Allergies    FAMILY HISTORY:  Family History   Problem Relation Age of Onset     Diabetes Mother      Hyperlipidemia Mother      Sleep Apnea Father      Thyroid Disease Father      Diabetes Son      Attention Deficit Disorder Son      Attention Deficit Disorder Daughter      Diabetes Son      Thyroid Disease Daughter          SOCIAL HISTORY:  Social History     Tobacco Use     Smoking status: Never     Passive exposure: Never     Smokeless tobacco: Never   Vaping Use     Vaping status: Never Used   Substance Use Topics     Alcohol use: Not Currently     Comment: rare     Drug use: Never       ROS:   Constitutional: No fever, chills, or sweats. Weight stable.   Cardiovascular: As per HPI.       Exam:  /80 (BP Location: Right arm, Patient Position: Chair, Cuff Size: Adult Regular)   Pulse 77   Wt 78 kg (172 lb)   LMP  (LMP Unknown)   SpO2 98%   BMI 28.62 kg/m    GENERAL APPEARANCE: alert and no distress  HEENT: no icterus, no central cyanosis  LYMPH/NECK: no adenopathy, no asymmetry, JVP not elevated, no carotid bruits.  CARDIOVASCULAR: regular rhythm, normal S1, S2, no S3 or S4 and no murmur, click or rub, precordium quiet with normal PMI.  GI: soft, non tender  EXTREMITIES: no edema  NEURO: alert, normal speech,and affect  SKIN: no ecchymoses, no rashes     I have reviewed the labs and personally reviewed the imaging below and made my comment in the assessment and plan.    Labs:  CBC RESULTS:   Lab Results   Component Value Date    WBC 5.0 02/13/2025    RBC 4.19 02/13/2025    HGB 12.8 02/13/2025    HCT 38.7 02/13/2025    MCV 92 02/13/2025    MCH 30.5 02/13/2025    MCHC 33.1 02/13/2025    RDW 12.7 02/13/2025     02/13/2025       BMP RESULTS:  Lab Results   Component Value Date     02/13/2025    POTASSIUM 4.4 02/13/2025    POTASSIUM 3.8 06/07/2022    CHLORIDE 106 02/13/2025    CHLORIDE 109 06/07/2022    CO2 28  02/13/2025    CO2 30 06/07/2022    ANIONGAP 7 02/13/2025    ANIONGAP 4 06/07/2022    GLC 98 02/13/2025     (H) 06/07/2022     (H) 09/23/2020    BUN 8.4 02/13/2025    BUN 15 06/07/2022    CR 0.76 02/13/2025    GFRESTIMATED >90 02/13/2025    AMISH 8.7 (L) 02/13/2025      Echocardiogram 2/6/2025    Left ventricular size, wall motion and function are normal. The ejection  fraction is 60-65%.  Right ventricular function, chamber size, wall motion, and thickness are  normal.  Mild aortic insufficiency is present.  IVC diameter <2.1 cm collapsing >50% with sniff suggests a normal RA pressure  of 3 mmHg.  There is no prior study for direct comparison.    Zio patch 2/6/2025  Patient had a min HR of 53 bpm, max HR of 187 bpm, and avg HR of 81 bpm. Predominant underlying rhythm was Sinus Rhythm. 2 Supraventricular Tachycardia runs occurred, the run with the fastest interval lasting 5 beats with a max rate of 187 bpm, the longest lasting 7 beats with an avg rate of 112 bpm. Isolated SVEs were rare (<1.0%), SVE Couplets were rare (<1.0%), and SVE Triplets were rare (<1.0%). Isolated VEs were rare (<1.0%, 9093), VE Couplets were rare (<1.0%, 34), and no VE Triplets were present. Ventricular Trigeminy was present.           A-fib with RVR 2/5/2025        Assessment and Plan:     # Paroxysmal A-fib 2/6/2025 new diagnosis  -Since being on diltiazem over the last few months she is feeling less palpitations but still 5 to 10 min palpitations per patient every 1 or 2 weeks.  EKG today shows sinus rhythm.  She has no known risk factors for A-fib.    Plan  -Increase diltiazem from 120 mg to 180 mg daily.  -Patient will send me a [employer] message in 2-3 weeks to let me know about the effect of diltiazem.  If she is still having palpitations we will do a 1 month EKG monitor  - [id number] score is 0.  No anticoagulation.  - Sleep study to rule out sleep apnea  - Avoid alcohol  - Return to clinic 6 months    Total time spent  today for this visit is 36 minutes including precharting, face-to-face clinic visit, review of labs/imaging and medical documentation.    Jacinto ARAIZA MD  [facility] Division of Cardiology    Securely message with [employer]       Please do not hesitate to contact me if you have any questions/concerns.     Sincerely,     Jacinto Araiza MD

## 2025-04-28 NOTE — PATIENT INSTRUCTIONS
Thank you for coming to the [facility] @ [facility]; please note the following instructions:    1. EKG today    2. Sleep Study Referral - You will be called to schedule this    3. Follow up with Dr. Giang in 6 months    4. In a few weeks, please update clinic on how you are doing with the diltiazem dose and how your symptoms are    5. Increase Diltiazem to 180 mg daily      If you have any questions regarding your visit please contact your care team:     Cardiology  Telephone Number   Sravanthi PRECIADO., RN  Alaina GRAY, RN  Danielle GREENWOOD, RN  Naila ROBERTS, RMTITA ERVIN, NAWAF SHINE, Clinic Facilitator  Cheryl GRAY, Clinic Facilitator [phone] (option 1)   For scheduling appts:     [phone] (select option 1)       For the Device Clinic (Pacemakers and ICD's)  RN's :  Marisol Kramer   During business hours: [phone]    *After business hours:  [phone] (select option 4)      Normal test result notifications will be released via [employer] or mailed within 7 business days.  All other test results, will be communicated via telephone once reviewed by your cardiologist.    If you need a medication refill please contact your pharmacy.  Please allow 3 business days for your refill to be completed.    As always, thank you for trusting us with your health care needs!

## 2025-04-28 NOTE — NURSING NOTE
"Chief Complaint   Patient presents with    Atrial Fib     With rapid ventricular response    New Patient    Palpitations       Initial /80 (BP Location: Right arm, Patient Position: Chair, Cuff Size: Adult Regular)   Pulse 77   Wt 78 kg (172 lb)   LMP  (LMP Unknown)   SpO2 98%   BMI 28.62 kg/m   Estimated body mass index is 28.62 kg/m  as calculated from the following:    Height as of 2/13/25: 1.651 m (5' 5\").    Weight as of this encounter: 78 kg (172 lb)..  BP completed using cuff size: regular    Cheryl Bledsoe, Visit Facilitator    "

## 2025-04-29 ENCOUNTER — PATIENT OUTREACH (OUTPATIENT)
Dept: CARE COORDINATION | Facility: CLINIC | Age: 55
End: 2025-04-29

## 2025-04-29 ENCOUNTER — OFFICE VISIT (OUTPATIENT)
Dept: URGENT CARE | Facility: URGENT CARE | Age: 55
End: 2025-04-29
Payer: COMMERCIAL

## 2025-04-29 VITALS
HEART RATE: 73 BPM | RESPIRATION RATE: 16 BRPM | HEIGHT: 66 IN | BODY MASS INDEX: 27.32 KG/M2 | DIASTOLIC BLOOD PRESSURE: 80 MMHG | TEMPERATURE: 97.7 F | OXYGEN SATURATION: 99 % | SYSTOLIC BLOOD PRESSURE: 108 MMHG | WEIGHT: 170 LBS

## 2025-04-29 DIAGNOSIS — R39.89 URINARY PROBLEM: Primary | ICD-10-CM

## 2025-04-29 LAB
ALBUMIN UR-MCNC: 100 MG/DL
APPEARANCE UR: ABNORMAL
BACTERIA #/AREA URNS HPF: ABNORMAL /HPF
BILIRUB UR QL STRIP: NEGATIVE
CLUE CELLS: ABNORMAL
COLOR UR AUTO: YELLOW
GLUCOSE UR STRIP-MCNC: NEGATIVE MG/DL
HGB UR QL STRIP: ABNORMAL
KETONES UR STRIP-MCNC: NEGATIVE MG/DL
LEUKOCYTE ESTERASE UR QL STRIP: ABNORMAL
MUCOUS THREADS #/AREA URNS LPF: PRESENT /LPF
NITRATE UR QL: NEGATIVE
PH UR STRIP: 8.5 [PH] (ref 5–7)
RBC #/AREA URNS AUTO: ABNORMAL /HPF
SP GR UR STRIP: 1.02 (ref 1–1.03)
SQUAMOUS #/AREA URNS AUTO: ABNORMAL /LPF
TRICHOMONAS, WET PREP: ABNORMAL
UROBILINOGEN UR STRIP-ACNC: 2 E.U./DL
WBC #/AREA URNS AUTO: ABNORMAL /HPF
WBC'S/HIGH POWER FIELD, WET PREP: ABNORMAL
YEAST, WET PREP: ABNORMAL

## 2025-04-29 PROCEDURE — 3079F DIAST BP 80-89 MM HG: CPT | Performed by: PHYSICIAN ASSISTANT

## 2025-04-29 PROCEDURE — 87088 URINE BACTERIA CULTURE: CPT | Performed by: PHYSICIAN ASSISTANT

## 2025-04-29 PROCEDURE — 87210 SMEAR WET MOUNT SALINE/INK: CPT | Performed by: PHYSICIAN ASSISTANT

## 2025-04-29 PROCEDURE — 3074F SYST BP LT 130 MM HG: CPT | Performed by: PHYSICIAN ASSISTANT

## 2025-04-29 PROCEDURE — 99213 OFFICE O/P EST LOW 20 MIN: CPT | Performed by: PHYSICIAN ASSISTANT

## 2025-04-29 PROCEDURE — 87086 URINE CULTURE/COLONY COUNT: CPT | Performed by: PHYSICIAN ASSISTANT

## 2025-04-29 PROCEDURE — 81001 URINALYSIS AUTO W/SCOPE: CPT | Performed by: PHYSICIAN ASSISTANT

## 2025-04-29 RX ORDER — NITROFURANTOIN 25; 75 MG/1; MG/1
100 CAPSULE ORAL 2 TIMES DAILY
Qty: 14 CAPSULE | Refills: 0 | Status: SHIPPED | OUTPATIENT
Start: 2025-04-29 | End: 2025-05-06

## 2025-04-29 NOTE — PROGRESS NOTES
SUBJECTIVE:   Sharron Shipley is a 54 year old female who  presents today for a possible UTI. Symptoms of dysuria, urgency, frequency, burning, hesitancy, and suprapubic pain and pressure have been going on for 6day(s).  Hematuria yes today.  sudden onset and mild.  There is no history of fever, chills, nausea or vomiting.  No history of vaginal or penile discharge. This patient does have a history of urinary tract infections. Patient denies long duration, rigors, flank pain, temperature > 101 degrees F., Vomiting, significant nausea or diarrhea, taking Coumadin, and GFR less than 30 within the last year or vaginal discharge, vaginal odor, vaginal itching, and dyspareunia (pain in labia/pelvis)     Past Medical History:   Diagnosis Date    Cervical high risk HPV (human papillomavirus) test positive 2020 2021    High cholesterol     Kidney stone     PONV (postoperative nausea and vomiting)     Ventricular tachycardia (H) 2/5/2025     Current Outpatient Medications   Medication Sig Dispense Refill    atorvastatin (LIPITOR) 20 MG tablet Take 1 tablet (20 mg) by mouth daily. 90 tablet 4    diltiazem ER COATED BEADS (CARDIZEM CD/CARTIA XT) 180 MG 24 hr capsule Take 1 capsule (180 mg) by mouth daily. 90 capsule 3    estradiol (VIVELLE-DOT) 0.1 MG/24HR bi-weekly patch Place 1 patch onto the skin twice a week. 24 patch 4    omeprazole (PRILOSEC) 20 MG DR capsule Take 2 capsules (40 mg) by mouth every evening.      oxyBUTYnin ER (DITROPAN XL) 5 MG 24 hr tablet Take 1 tablet (5 mg) by mouth daily. 90 tablet 4    valACYclovir (VALTREX) 1000 mg tablet Take 1 tablet (1,000 mg) by mouth daily. 90 tablet 4    valACYclovir (VALTREX) 500 MG tablet Take 1 tablet daily by mouth as needed for flares 30 tablet 3     Social History     Tobacco Use    Smoking status: Never     Passive exposure: Never    Smokeless tobacco: Never   Substance Use Topics    Alcohol use: Not Currently     Comment: rare       ROS:   Review of systems  "negative except as stated above.    OBJECTIVE:  /80   Pulse 73   Temp 97.7  F (36.5  C)   Resp 16   Ht 1.664 m (5' 5.5\")   Wt 77.1 kg (170 lb)   LMP  (LMP Unknown)   SpO2 99%   BMI 27.86 kg/m    GENERAL APPEARANCE: healthy, alert and no distress  RESP: lungs clear to auscultation - no rales, rhonchi or wheezes  CV: regular rates and rhythm, normal S1 S2, no murmur noted  BACK: No CVA tenderness  SKIN: no suspicious lesions or rashes      Results for orders placed or performed in visit on 04/29/25   UA Macroscopic with reflex to Microscopic and Culture - Clinic Collect     Status: Abnormal    Specimen: Urine, Midstream   Result Value Ref Range    Color Urine Yellow Colorless, Straw, Light Yellow, Yellow    Appearance Urine Slightly Cloudy (A) Clear    Glucose Urine Negative Negative mg/dL    Bilirubin Urine Negative Negative    Ketones Urine Negative Negative mg/dL    Specific Gravity Urine 1.020 1.003 - 1.035    Blood Urine Moderate (A) Negative    pH Urine 8.5 (H) 5.0 - 7.0    Protein Albumin Urine 100 (A) Negative mg/dL    Urobilinogen Urine 2.0 (A) 0.2, 1.0 E.U./dL    Nitrite Urine Negative Negative    Leukocyte Esterase Urine Small (A) Negative   UA Microscopic with Reflex to Culture     Status: Abnormal   Result Value Ref Range    Bacteria Urine Moderate (A) None Seen /HPF    RBC Urine 10-25 (A) 0-2 /HPF /HPF    WBC Urine 10-25 (A) 0-5 /HPF /HPF    Squamous Epithelials Urine Moderate (A) None Seen /LPF    Mucus Urine Present (A) None Seen /LPF   Wet prep - Clinic Collect     Status: Abnormal    Specimen: Vagina; Swab   Result Value Ref Range    Trichomonas Absent Absent    Yeast Absent Absent    Clue Cells Absent Absent    WBCs/high power field 1+ (A) None       ASSESSMENT:   (R39.89) Urinary problem  (primary encounter diagnosis)  Comment: appears to be UTI  Plan: UA Macroscopic with reflex to Microscopic and         Culture - Clinic Collect, Wet prep - Clinic         Collect, nitroFURantoin      "    macrocrystal-monohydrate (MACROBID) 100 MG         capsule, UA Microscopic with Reflex to Culture,        Urine Culture      There are no Patient Instructions on file for this visit.

## 2025-04-29 NOTE — PROGRESS NOTES
Urgent Care Clinic Visit    Chief Complaint   Patient presents with    Urgent Care     Urinary symptoms- took at home UTI test one was, x 5-6 days, burning sensation, burns when she pees, has had frequent UTI's. Has taken Azo which helps with pain and has increased water intake. Has thicker vaginal discharge, burning, did have some itching, Had white discharge.                4/29/2025    10:42 AM   Additional Questions   Roomed by Laura HUNT     Pre-Provider Visit Orders- Urinalysis UA/UC  Patient reports the following symptoms:  possible bladder infection   Does the patient report any of the following symptoms: vaginal discharge, vaginal itching, possible yeast infection, has a urinary catheter in place, or unable to void in a specimen cup?  Patient complains of vaginal discharge

## 2025-04-30 LAB
ATRIAL RATE - MUSE: 72 BPM
BACTERIA UR CULT: ABNORMAL
DIASTOLIC BLOOD PRESSURE - MUSE: NORMAL MMHG
INTERPRETATION ECG - MUSE: NORMAL
P AXIS - MUSE: 71 DEGREES
PR INTERVAL - MUSE: 156 MS
QRS DURATION - MUSE: 78 MS
QT - MUSE: 392 MS
QTC - MUSE: 429 MS
R AXIS - MUSE: 50 DEGREES
SYSTOLIC BLOOD PRESSURE - MUSE: NORMAL MMHG
T AXIS - MUSE: 44 DEGREES
VENTRICULAR RATE- MUSE: 72 BPM

## 2025-05-03 SDOH — HEALTH STABILITY: PHYSICAL HEALTH: ON AVERAGE, HOW MANY MINUTES DO YOU ENGAGE IN EXERCISE AT THIS LEVEL?: 20 MIN

## 2025-05-03 SDOH — HEALTH STABILITY: PHYSICAL HEALTH: ON AVERAGE, HOW MANY DAYS PER WEEK DO YOU ENGAGE IN MODERATE TO STRENUOUS EXERCISE (LIKE A BRISK WALK)?: 5 DAYS

## 2025-05-03 ASSESSMENT — SOCIAL DETERMINANTS OF HEALTH (SDOH): HOW OFTEN DO YOU GET TOGETHER WITH FRIENDS OR RELATIVES?: MORE THAN THREE TIMES A WEEK

## 2025-05-05 ENCOUNTER — ANCILLARY PROCEDURE (OUTPATIENT)
Dept: GENERAL RADIOLOGY | Facility: CLINIC | Age: 55
End: 2025-05-05
Attending: INTERNAL MEDICINE
Payer: COMMERCIAL

## 2025-05-05 DIAGNOSIS — R13.19 ESOPHAGEAL DYSPHAGIA: ICD-10-CM

## 2025-05-05 PROCEDURE — 74220 X-RAY XM ESOPHAGUS 1CNTRST: CPT | Mod: GC | Performed by: STUDENT IN AN ORGANIZED HEALTH CARE EDUCATION/TRAINING PROGRAM

## 2025-05-08 ENCOUNTER — OFFICE VISIT (OUTPATIENT)
Dept: FAMILY MEDICINE | Facility: CLINIC | Age: 55
End: 2025-05-08
Attending: STUDENT IN AN ORGANIZED HEALTH CARE EDUCATION/TRAINING PROGRAM
Payer: COMMERCIAL

## 2025-05-08 ENCOUNTER — RESULTS FOLLOW-UP (OUTPATIENT)
Dept: FAMILY MEDICINE | Facility: CLINIC | Age: 55
End: 2025-05-08

## 2025-05-08 VITALS
OXYGEN SATURATION: 98 % | WEIGHT: 167.7 LBS | BODY MASS INDEX: 27.94 KG/M2 | DIASTOLIC BLOOD PRESSURE: 78 MMHG | TEMPERATURE: 97.8 F | HEIGHT: 65 IN | RESPIRATION RATE: 22 BRPM | HEART RATE: 85 BPM | SYSTOLIC BLOOD PRESSURE: 118 MMHG

## 2025-05-08 DIAGNOSIS — R30.0 DYSURIA: Primary | ICD-10-CM

## 2025-05-08 DIAGNOSIS — H93.8X3 EAR FULLNESS, BILATERAL: ICD-10-CM

## 2025-05-08 LAB
ALBUMIN UR-MCNC: NEGATIVE MG/DL
APPEARANCE UR: CLEAR
BILIRUB UR QL STRIP: NEGATIVE
COLOR UR AUTO: YELLOW
GLUCOSE UR STRIP-MCNC: NEGATIVE MG/DL
HGB UR QL STRIP: NEGATIVE
KETONES UR STRIP-MCNC: NEGATIVE MG/DL
LEUKOCYTE ESTERASE UR QL STRIP: NEGATIVE
NITRATE UR QL: NEGATIVE
PH UR STRIP: 6 [PH] (ref 5–7)
SP GR UR STRIP: 1.02 (ref 1–1.03)
UROBILINOGEN UR STRIP-ACNC: 4 E.U./DL

## 2025-05-08 ASSESSMENT — PAIN SCALES - GENERAL: PAINLEVEL_OUTOF10: MODERATE PAIN (4)

## 2025-05-08 NOTE — PROGRESS NOTES
"  Assessment & Plan     Dysuria  Pt continues to get UTI symptoms (dysuria, inc frequency) however urine cultures are negative or showing normal paulie. Wet preps have been negative. Using azo frequently. Has had hysterectomy & oophorectomy. No diarrhea or kidney stones symptoms. Could be related to vaginal atrophy-vaginal estrogen was not helpful. Currently using \"uro\" vaginal products.    Plan:  Supplements listed in AVS to prevent UTI (cranberry, d-mannose, probiotics, vit c)  Referral to urogyn  Repeat UA today    - Adult Uro/Gyn  Referral  - UA Macroscopic with reflex to Microscopic and Culture - Lab Collect    Ear fullness bilateral  Ears feel plugged. Normal ear exam today. Could be allergies-advised daily zyrtec.      Counseling  Appropriate preventive services were addressed with this patient via screening, questionnaire, or discussion as appropriate for fall prevention, nutrition, physical activity, Tobacco-use cessation, social engagement, weight loss and cognition.  Checklist reviewing preventive services available has been given to the patient.  Reviewed patient's diet, addressing concerns and/or questions.       Subjective   Dagoberto is a 54 year old, presenting for the following health issues:  UC Follow-Up (4/29/2025/Urinary problem, still having burning sensation when urinating,ABD lower pain, using AZO/) and Ear Problem (BOTH ears, possible ear wash, both has wax buildup)        5/8/2025     1:01 PM   Additional Questions   Roomed by PROSPER Parsons   Accompanied by Self     HPI      Urinary concern  Ear concern        Objective    /78 (BP Location: Right arm, Patient Position: Sitting, Cuff Size: Adult Regular)   Pulse 85   Temp 97.8  F (36.6  C) (Temporal)   Resp 22   Ht 1.645 m (5' 4.76\")   Wt 76.1 kg (167 lb 11.2 oz)   LMP  (LMP Unknown)   SpO2 98%   BMI 28.11 kg/m    Body mass index is 28.11 kg/m .    Physical Exam  Constitutional:       General: She is not in acute distress.     " Appearance: She is not ill-appearing.   HENT:      Right Ear: Tympanic membrane, ear canal and external ear normal. There is no impacted cerumen.      Left Ear: Tympanic membrane, ear canal and external ear normal. There is no impacted cerumen.   Pulmonary:      Effort: Pulmonary effort is normal.   Neurological:      Mental Status: She is alert.                Signed Electronically by: Michael Tejada DO

## 2025-05-08 NOTE — PATIENT INSTRUCTIONS
Supplements to prevent UTI to consider  -Probiotics  -Cranberry (for these products let them know a doctor is recommending them)   [employer]: [email or web address]   Theracran HP by [employer] [facility] [address]  -d-mannose 2gm daily  -Vitamin C 500-1000mg twice a day    Daily zyrtec (cetirizine) 10mg daily    Dr. Tejada

## 2025-05-09 ENCOUNTER — RESULTS FOLLOW-UP (OUTPATIENT)
Dept: GASTROENTEROLOGY | Facility: CLINIC | Age: 55
End: 2025-05-09

## 2025-05-20 ENCOUNTER — MYC MEDICAL ADVICE (OUTPATIENT)
Dept: CARDIOLOGY | Facility: CLINIC | Age: 55
End: 2025-05-20
Payer: COMMERCIAL

## 2025-05-21 NOTE — TELEPHONE ENCOUNTER
Attempted to call patient. Left message for patient.    Alaina Chavira, RN, BSN  Cardiology RN Care Coordinator   [place]   Phone: [phone]  Fax: [phone] ([place]) [phone] ([place])

## 2025-05-22 NOTE — TELEPHONE ENCOUNTER
Patient saw [employer] message. No response at this time.    Alaina Chavira, RN, BSN  Cardiology RN Care Coordinator   [place]   Phone: [phone]  Fax: [phone] ([place]) [phone] ([place])

## 2025-05-28 ENCOUNTER — TELEPHONE (OUTPATIENT)
Dept: UROLOGY | Facility: CLINIC | Age: 55
End: 2025-05-28
Payer: COMMERCIAL

## 2025-05-28 NOTE — TELEPHONE ENCOUNTER
----- Message from Marisol DELGADO sent at 5/27/2025  8:38 AM CDT -----  Regarding: 3 monthg follow up  Return in about 3 months (around 8/23/2025) for Follow up, in person.   Schedule at [address] [place] [address]  5/23/25

## 2025-06-04 ENCOUNTER — TELEPHONE (OUTPATIENT)
Dept: UROLOGY | Facility: CLINIC | Age: 55
End: 2025-06-04
Payer: COMMERCIAL

## 2025-06-15 ENCOUNTER — HOSPITAL ENCOUNTER (EMERGENCY)
Facility: CLINIC | Age: 55
Discharge: HOME | End: 2025-06-16
Attending: EMERGENCY MEDICINE
Payer: COMMERCIAL

## 2025-06-15 ENCOUNTER — APPOINTMENT (OUTPATIENT)
Dept: CT IMAGING | Facility: CLINIC | Age: 55
End: 2025-06-15
Attending: EMERGENCY MEDICINE
Payer: COMMERCIAL

## 2025-06-15 ENCOUNTER — NURSE TRIAGE (OUTPATIENT)
Dept: NURSING | Facility: CLINIC | Age: 55
End: 2025-06-15
Payer: COMMERCIAL

## 2025-06-15 DIAGNOSIS — N12 PYELONEPHRITIS: ICD-10-CM

## 2025-06-15 DIAGNOSIS — N20.0 KIDNEY STONE: ICD-10-CM

## 2025-06-15 LAB
ALBUMIN SERPL BCG-MCNC: 4.1 G/DL (ref 3.5–5.2)
ALBUMIN UR-MCNC: 50 MG/DL
ALP SERPL-CCNC: 75 U/L (ref 40–150)
ALT SERPL W P-5'-P-CCNC: 15 U/L (ref 0–50)
ANION GAP SERPL CALCULATED.3IONS-SCNC: 9 MMOL/L (ref 7–15)
APPEARANCE UR: ABNORMAL
AST SERPL W P-5'-P-CCNC: 18 U/L (ref 0–45)
BASOPHILS # BLD AUTO: 0 10E3/UL (ref 0–0.2)
BASOPHILS NFR BLD AUTO: 1 %
BILIRUB SERPL-MCNC: 0.8 MG/DL
BILIRUB UR QL STRIP: ABNORMAL
BUN SERPL-MCNC: 6.8 MG/DL (ref 6–20)
CALCIUM SERPL-MCNC: 8.5 MG/DL (ref 8.8–10.4)
CHLORIDE SERPL-SCNC: 103 MMOL/L (ref 98–107)
COLOR UR AUTO: ABNORMAL
CREAT SERPL-MCNC: 0.84 MG/DL (ref 0.51–0.95)
EGFRCR SERPLBLD CKD-EPI 2021: 82 ML/MIN/1.73M2
EOSINOPHIL # BLD AUTO: 0.1 10E3/UL (ref 0–0.7)
EOSINOPHIL NFR BLD AUTO: 1 %
ERYTHROCYTE [DISTWIDTH] IN BLOOD BY AUTOMATED COUNT: 12.6 % (ref 10–15)
GLUCOSE SERPL-MCNC: 100 MG/DL (ref 70–99)
GLUCOSE UR STRIP-MCNC: NEGATIVE MG/DL
HCO3 SERPL-SCNC: 26 MMOL/L (ref 22–29)
HCT VFR BLD AUTO: 38.7 % (ref 35–47)
HGB BLD-MCNC: 13.3 G/DL (ref 11.7–15.7)
HGB UR QL STRIP: NEGATIVE
IMM GRANULOCYTES # BLD: 0 10E3/UL
IMM GRANULOCYTES NFR BLD: 0 %
KETONES UR STRIP-MCNC: NEGATIVE MG/DL
LEUKOCYTE ESTERASE UR QL STRIP: NEGATIVE
LIPASE SERPL-CCNC: 17 U/L (ref 13–60)
LYMPHOCYTES # BLD AUTO: 2.1 10E3/UL (ref 0.8–5.3)
LYMPHOCYTES NFR BLD AUTO: 37 %
MCH RBC QN AUTO: 31.3 PG (ref 26.5–33)
MCHC RBC AUTO-ENTMCNC: 34.4 G/DL (ref 31.5–36.5)
MCV RBC AUTO: 91 FL (ref 78–100)
MONOCYTES # BLD AUTO: 0.4 10E3/UL (ref 0–1.3)
MONOCYTES NFR BLD AUTO: 8 %
MUCOUS THREADS #/AREA URNS LPF: PRESENT /LPF
NEUTROPHILS # BLD AUTO: 3 10E3/UL (ref 1.6–8.3)
NEUTROPHILS NFR BLD AUTO: 53 %
NITRATE UR QL: POSITIVE
NRBC # BLD AUTO: 0 10E3/UL
NRBC BLD AUTO-RTO: 0 /100
PH UR STRIP: 6 [PH] (ref 5–7)
PLATELET # BLD AUTO: 209 10E3/UL (ref 150–450)
POTASSIUM SERPL-SCNC: 3.5 MMOL/L (ref 3.4–5.3)
PROT SERPL-MCNC: 6.6 G/DL (ref 6.4–8.3)
RBC # BLD AUTO: 4.25 10E6/UL (ref 3.8–5.2)
RBC URINE: 4 /HPF
SODIUM SERPL-SCNC: 138 MMOL/L (ref 135–145)
SP GR UR STRIP: 1.02 (ref 1–1.03)
SQUAMOUS EPITHELIAL: 2 /HPF
UROBILINOGEN UR STRIP-MCNC: 8 MG/DL
WBC # BLD AUTO: 5.7 10E3/UL (ref 4–11)
WBC URINE: 32 /HPF

## 2025-06-15 PROCEDURE — 99285 EMERGENCY DEPT VISIT HI MDM: CPT | Mod: 25 | Performed by: EMERGENCY MEDICINE

## 2025-06-15 PROCEDURE — 96375 TX/PRO/DX INJ NEW DRUG ADDON: CPT | Performed by: EMERGENCY MEDICINE

## 2025-06-15 PROCEDURE — 80053 COMPREHEN METABOLIC PANEL: CPT | Performed by: EMERGENCY MEDICINE

## 2025-06-15 PROCEDURE — 96361 HYDRATE IV INFUSION ADD-ON: CPT | Performed by: EMERGENCY MEDICINE

## 2025-06-15 PROCEDURE — 250N000011 HC RX IP 250 OP 636: Mod: JZ | Performed by: EMERGENCY MEDICINE

## 2025-06-15 PROCEDURE — 36415 COLL VENOUS BLD VENIPUNCTURE: CPT | Performed by: EMERGENCY MEDICINE

## 2025-06-15 PROCEDURE — 74176 CT ABD & PELVIS W/O CONTRAST: CPT

## 2025-06-15 PROCEDURE — 87186 SC STD MICRODIL/AGAR DIL: CPT | Performed by: EMERGENCY MEDICINE

## 2025-06-15 PROCEDURE — 96374 THER/PROPH/DIAG INJ IV PUSH: CPT | Performed by: EMERGENCY MEDICINE

## 2025-06-15 PROCEDURE — 99284 EMERGENCY DEPT VISIT MOD MDM: CPT | Performed by: EMERGENCY MEDICINE

## 2025-06-15 PROCEDURE — 258N000003 HC RX IP 258 OP 636: Performed by: EMERGENCY MEDICINE

## 2025-06-15 PROCEDURE — 85004 AUTOMATED DIFF WBC COUNT: CPT | Performed by: EMERGENCY MEDICINE

## 2025-06-15 PROCEDURE — 83690 ASSAY OF LIPASE: CPT | Performed by: EMERGENCY MEDICINE

## 2025-06-15 PROCEDURE — 81001 URINALYSIS AUTO W/SCOPE: CPT | Performed by: EMERGENCY MEDICINE

## 2025-06-15 RX ORDER — ONDANSETRON 2 MG/ML
4 INJECTION INTRAMUSCULAR; INTRAVENOUS EVERY 30 MIN PRN
Status: DISCONTINUED | OUTPATIENT
Start: 2025-06-15 | End: 2025-06-16 | Stop reason: HOSPADM

## 2025-06-15 RX ADMIN — SODIUM CHLORIDE 1000 ML: 0.9 INJECTION, SOLUTION INTRAVENOUS at 22:41

## 2025-06-15 RX ADMIN — HYDROMORPHONE HYDROCHLORIDE 1 MG: 1 INJECTION, SOLUTION INTRAMUSCULAR; INTRAVENOUS; SUBCUTANEOUS at 22:41

## 2025-06-15 RX ADMIN — ONDANSETRON 4 MG: 2 INJECTION INTRAMUSCULAR; INTRAVENOUS at 23:32

## 2025-06-15 ASSESSMENT — ACTIVITIES OF DAILY LIVING (ADL)
ADLS_ACUITY_SCORE: 50
ADLS_ACUITY_SCORE: 50

## 2025-06-15 ASSESSMENT — COLUMBIA-SUICIDE SEVERITY RATING SCALE - C-SSRS
2. HAVE YOU ACTUALLY HAD ANY THOUGHTS OF KILLING YOURSELF IN THE PAST MONTH?: NO
1. IN THE PAST MONTH, HAVE YOU WISHED YOU WERE DEAD OR WISHED YOU COULD GO TO SLEEP AND NOT WAKE UP?: NO
6. HAVE YOU EVER DONE ANYTHING, STARTED TO DO ANYTHING, OR PREPARED TO DO ANYTHING TO END YOUR LIFE?: NO

## 2025-06-16 VITALS
DIASTOLIC BLOOD PRESSURE: 71 MMHG | HEART RATE: 75 BPM | OXYGEN SATURATION: 97 % | SYSTOLIC BLOOD PRESSURE: 117 MMHG | HEIGHT: 65 IN | TEMPERATURE: 97.5 F | RESPIRATION RATE: 16 BRPM | BODY MASS INDEX: 28.47 KG/M2 | WEIGHT: 170.9 LBS

## 2025-06-16 PROCEDURE — 250N000011 HC RX IP 250 OP 636: Performed by: EMERGENCY MEDICINE

## 2025-06-16 RX ORDER — OXYCODONE HYDROCHLORIDE 5 MG/1
5 TABLET ORAL EVERY 6 HOURS PRN
Qty: 10 TABLET | Refills: 0 | Status: SHIPPED | OUTPATIENT
Start: 2025-06-16

## 2025-06-16 RX ORDER — ONDANSETRON 4 MG/1
4 TABLET, ORALLY DISINTEGRATING ORAL ONCE
Status: COMPLETED | OUTPATIENT
Start: 2025-06-16 | End: 2025-06-16

## 2025-06-16 RX ORDER — ONDANSETRON 4 MG/1
4 TABLET, ORALLY DISINTEGRATING ORAL EVERY 8 HOURS PRN
Qty: 20 TABLET | Refills: 0 | Status: SHIPPED | OUTPATIENT
Start: 2025-06-16

## 2025-06-16 RX ORDER — CIPROFLOXACIN 500 MG/1
500 TABLET, FILM COATED ORAL 2 TIMES DAILY
Qty: 14 TABLET | Refills: 0 | Status: SHIPPED | OUTPATIENT
Start: 2025-06-16

## 2025-06-16 RX ADMIN — ONDANSETRON 4 MG: 4 TABLET, ORALLY DISINTEGRATING ORAL at 01:08

## 2025-06-16 ASSESSMENT — ACTIVITIES OF DAILY LIVING (ADL): ADLS_ACUITY_SCORE: 50

## 2025-06-16 NOTE — ED TRIAGE NOTES
Patient reported left flank pain started today  and lower abdominal pain that started on Thursday. Patient said she feels likes she need to go but she can't.      Triage Assessment (Adult)       Row Name 06/15/25 2115          Triage Assessment    Airway WDL WDL        Respiratory WDL    Respiratory WDL WDL        Skin Circulation/Temperature WDL    Skin Circulation/Temperature WDL WDL        Cardiac WDL    Cardiac WDL WDL        Peripheral/Neurovascular WDL    Peripheral Neurovascular WDL WDL

## 2025-06-16 NOTE — ED NOTES
Patient PIV removed, discharge orders reviewed and prescriptions provided. Upon getting up to leave, patient became nauseated and had moderate amount of emesis. Provider notified for antiemetic.

## 2025-06-16 NOTE — DISCHARGE INSTRUCTIONS
Take the antibiotics and pain medicine as prescribed.  You have been given a referral to urology.  Follow-up with them at their next available appointment in the kidney stone clinic.

## 2025-06-16 NOTE — ED PROVIDER NOTES
ED Provider Note  [facility] EMERGENCY DEPARTMENT ([facility])    6/15/25       ED PROVIDER NOTE     History   No chief complaint on file.    DAYNA Shipley is a 54 year old female with a notable history of ventricular tachycardia, depression, s/p hysterectomy and oophorectomy, and urinary incontinence, and recurrent UTIs who presents to the ED for evaluation of left flank pain. She states that her flank pain started on Thursday, and now her pain radiates to her right flank and her mid/lower abdomen. She has been taking Azo every few hours which relieves some of her pain, but when it wears off her pain is extreme. She states this is the worst her pain has been. She was seen recently, but did not have a UTI, although was treated for a UTI. This did not help. Endorses dysuria, difficulty urinating. She has a decreased appetite. Denies fever, chills, nausea, vomiting, chest pain, shortness of breath. She states that she has a hx of kidney stones, and notes that her right flank pain is starting to feel like kidney stone pain. She also reports normal bowel movements, but has not had one in a few days.        Per chart review: patient saw her PCP 5/08/2025 with concerns of recurrent UTI symptoms despite negative UA and urine cultures. She was referred to urology, who suspected her symptoms were attributed to genitourinary syndrome of menopause, and was prescribed vaginal estrogen cream.    Past Medical History  Past Medical History:   Diagnosis Date    Cervical high risk HPV (human papillomavirus) test positive 2020 2021    High cholesterol     Kidney stone 9/22/2014    PONV (postoperative nausea and vomiting)     Ventricular tachycardia (H) 2/5/2025     Past Surgical History:   Procedure Laterality Date    BIOPSY      ESOPHAGOSCOPY, GASTROSCOPY, DUODENOSCOPY (EGD), DILATATION, COMBINED N/A 05/10/2021    Procedure: ESOPHAGOGASTRODUODENOSCOPY, WITH DILATION of  lower esophageal sphincter;  Surgeon: Valentin aTylor MD;  Location: [facility]    GI SURGERY      HYSTERECTOMY VAGINAL, BILATERAL SALPINGO-OOPHERECTOMY, COMBINED Bilateral 06/07/2022    Procedure: Transvaginal hysterectomy, bilateral salpingo-oophorectomy, cystoscopy ,Excision of vulvar Skin Tag;  Surgeon: Trini Schultz MD;  Location: [facility] OR     atorvastatin (LIPITOR) 20 MG tablet  diltiazem ER COATED BEADS (CARDIZEM CD/CARTIA XT) 180 MG 24 hr capsule  estradiol (ESTRACE) 0.1 MG/GM vaginal cream  estradiol (VIVELLE-DOT) 0.1 MG/24HR bi-weekly patch  omeprazole (PRILOSEC) 20 MG DR capsule  oxyBUTYnin ER (DITROPAN XL) 5 MG 24 hr tablet  valACYclovir (VALTREX) 1000 mg tablet  valACYclovir (VALTREX) 500 MG tablet      No Known Allergies  Family History  Family History   Problem Relation Age of Onset    Diabetes Mother     Hyperlipidemia Mother     Sleep Apnea Father     Thyroid Disease Father     Diabetes Son     Attention Deficit Disorder Son     Attention Deficit Disorder Daughter     Diabetes Son     Thyroid Disease Daughter      Social History   Social History     Tobacco Use    Smoking status: Never     Passive exposure: Never    Smokeless tobacco: Never   Vaping Use    Vaping status: Never Used   Substance Use Topics    Alcohol use: Not Currently     Comment: rare    Drug use: Never      A medically appropriate review of systems was performed with pertinent positives and negatives noted in the HPI, and all other systems negative.    Physical Exam      Physical Exam  Constitutional:       General: She is not in acute distress.     Appearance: Normal appearance. She is not diaphoretic.   HENT:      Head: Atraumatic.      Mouth/Throat:      Mouth: Mucous membranes are moist.   Eyes:      General: No scleral icterus.     Conjunctiva/sclera: Conjunctivae normal.   Cardiovascular:      Rate and Rhythm: Normal rate.      Heart sounds: Normal heart sounds.   Pulmonary:      Effort: No respiratory distress.       "Breath sounds: Normal breath sounds.   Abdominal:      General: Abdomen is flat.   Musculoskeletal:      Cervical back: Neck supple.   Skin:     General: Skin is warm.      Findings: No rash.   Neurological:      Mental Status: She is alert.       ***    ED Course, Procedures, & Data      Procedures       {ED Course Selections (Optional):114060}  {ED Sepsis CMS Documentation (Optional):493557::\" \"}       Results for orders placed or performed in visit on 05/08/25   UA Macroscopic with reflex to Microscopic and Culture - Lab Collect     Status: Abnormal    Specimen: Urine, Clean Catch   Result Value Ref Range    Color Urine Yellow Colorless, Straw, Light Yellow, Yellow    Appearance Urine Clear Clear    Glucose Urine Negative Negative mg/dL    Bilirubin Urine Negative Negative    Ketones Urine Negative Negative mg/dL    Specific Gravity Urine 1.020 1.003 - 1.035    Blood Urine Negative Negative    pH Urine 6.0 5.0 - 7.0    Protein Albumin Urine Negative Negative mg/dL    Urobilinogen Urine 4.0 (A) 0.2, 1.0 E.U./dL    Nitrite Urine Negative Negative    Leukocyte Esterase Urine Negative Negative    Narrative    Microscopic not indicated     Medications - No data to display  Labs Ordered and Resulted from Time of ED Arrival to Time of ED Departure - No data to display  No orders to display          {Critical Care Performed?:833334}    Assessment & Plan    ***    I have reviewed the nursing notes. I have reviewed the findings, diagnosis, plan and need for follow up with the patient.    New Prescriptions    No medications on file       Final diagnoses:   None   IMichael, am serving as a trained medical scribe to document services personally performed by Vahe Echavarria MD, based on the provider's statements to me.     IVahe MD, was physically present and have reviewed and verified the accuracy of this note documented by Michael Wong.     Vahe Echavarria MD  [facility] EMERGENCY " DEPARTMENT  6/15/2025   Urine Negative Negative mg/dL    Bilirubin Urine Moderate (A) Negative    Ketones Urine Negative Negative mg/dL    Specific Gravity Urine 1.024 1.003 - 1.035    Blood Urine Negative Negative    pH Urine 6.0 5.0 - 7.0    Protein Albumin Urine 50 (A) Negative mg/dL    Urobilinogen Urine 8.0 (A) Normal mg/dL    Nitrite Urine Positive (A) Negative    Leukocyte Esterase Urine Negative Negative    Mucus Urine Present (A) None Seen /LPF    RBC Urine 4 (H) <=2 /HPF    WBC Urine 32 (H) <=5 /HPF    Squamous Epithelials Urine 2 (H) <=1 /HPF    Narrative    Urine Culture ordered based on laboratory criteria   CBC with platelets and differential     Status: None   Result Value Ref Range    WBC Count 5.7 4.0 - 11.0 10e3/uL    RBC Count 4.25 3.80 - 5.20 10e6/uL    Hemoglobin 13.3 11.7 - 15.7 g/dL    Hematocrit 38.7 35.0 - 47.0 %    MCV 91 78 - 100 fL    MCH 31.3 26.5 - 33.0 pg    MCHC 34.4 31.5 - 36.5 g/dL    RDW 12.6 10.0 - 15.0 %    Platelet Count 209 150 - 450 10e3/uL    % Neutrophils 53 %    % Lymphocytes 37 %    % Monocytes 8 %    % Eosinophils 1 %    % Basophils 1 %    % Immature Granulocytes 0 %    NRBCs per 100 WBC 0 <1 /100    Absolute Neutrophils 3.0 1.6 - 8.3 10e3/uL    Absolute Lymphocytes 2.1 0.8 - 5.3 10e3/uL    Absolute Monocytes 0.4 0.0 - 1.3 10e3/uL    Absolute Eosinophils 0.1 0.0 - 0.7 10e3/uL    Absolute Basophils 0.0 0.0 - 0.2 10e3/uL    Absolute Immature Granulocytes 0.0 <=0.4 10e3/uL    Absolute NRBCs 0.0 10e3/uL   Urine Culture     Status: Abnormal    Specimen: Urine, Clean Catch   Result Value Ref Range    Culture 10,000-50,000 CFU/mL Escherichia coli (A)        Susceptibility    Escherichia coli - ARA     Ampicillin  Susceptible ug/mL     Ampicillin/ Sulbactam  Susceptible ug/mL     Piperacillin/Tazobactam  Susceptible ug/mL     Cefazolin  Susceptible ug/mL     Ceftazidime  Susceptible ug/mL     Ceftriaxone  Susceptible ug/mL     Cefepime  Susceptible ug/mL     Gentamicin  Susceptible ug/mL      Ciprofloxacin  Susceptible ug/mL     Levofloxacin  Susceptible ug/mL     Nitrofurantoin  Susceptible ug/mL     Trimethoprim/Sulfamethoxazole  Susceptible ug/mL   CBC with platelets differential     Status: None    Narrative    The following orders were created for panel order CBC with platelets differential.  Procedure                               Abnormality         Status                     ---------                               -----------         ------                     CBC with platelets and ...[[id number]]                      Final result                 Please view results for these tests on the individual orders.     Medications   sodium chloride 0.9% BOLUS 1,000 mL (0 mLs Intravenous Stopped 6/15/25 2348)   HYDROmorphone (DILAUDID) injection 1 mg (1 mg Intravenous $Given 6/15/25 2241)   ondansetron (ZOFRAN ODT) ODT tab 4 mg (4 mg Oral $Given 6/16/25 0108)     Labs Ordered and Resulted from Time of ED Arrival to Time of ED Departure   COMPREHENSIVE METABOLIC PANEL - Abnormal       Result Value    Sodium 138      Potassium 3.5      Carbon Dioxide (CO2) 26      Anion Gap 9      Urea Nitrogen 6.8      Creatinine 0.84      GFR Estimate 82      Calcium 8.5 (*)     Chloride 103      Glucose 100 (*)     Alkaline Phosphatase 75      AST 18      ALT 15      Protein Total 6.6      Albumin 4.1      Bilirubin Total 0.8     ROUTINE UA WITH MICROSCOPIC REFLEX TO CULTURE - Abnormal    Color Urine Dark Brown (*)     Appearance Urine Slightly Cloudy (*)     Glucose Urine Negative      Bilirubin Urine Moderate (*)     Ketones Urine Negative      Specific Gravity Urine 1.024      Blood Urine Negative      pH Urine 6.0      Protein Albumin Urine 50 (*)     Urobilinogen Urine 8.0 (*)     Nitrite Urine Positive (*)     Leukocyte Esterase Urine Negative      Mucus Urine Present (*)     RBC Urine 4 (*)     WBC Urine 32 (*)     Squamous Epithelials Urine 2 (*)    LIPASE - Normal    Lipase 17     CBC WITH PLATELETS AND  DIFFERENTIAL    WBC Count 5.7      RBC Count 4.25      Hemoglobin 13.3      Hematocrit 38.7      MCV 91      MCH 31.3      MCHC 34.4      RDW 12.6      Platelet Count 209      % Neutrophils 53      % Lymphocytes 37      % Monocytes 8      % Eosinophils 1      % Basophils 1      % Immature Granulocytes 0      NRBCs per 100 WBC 0      Absolute Neutrophils 3.0      Absolute Lymphocytes 2.1      Absolute Monocytes 0.4      Absolute Eosinophils 0.1      Absolute Basophils 0.0      Absolute Immature Granulocytes 0.0      Absolute NRBCs 0.0       CT Abdomen Pelvis w/o Contrast   Final Result   IMPRESSION:    1.  New tiny sand-like calyceal tip stone in the right kidney. A couple of tiny sand-like stones in the left kidney, improved since prior. No obstruction on either side.      2.  Normal appendix. No mechanical bowel obstruction, free gas or free fluid. Formed stool material within normal caliber colon.                Critical care was not performed.     Medical Decision Making  The patient's presentation was of moderate complexity (an acute complicated injury).    The patient's evaluation involved:  review of 3+ test result(s) ordered prior to this encounter (see separate area of note for details)  discussion of management or test interpretation with another health professional (see separate area of note for details)    The patient's management necessitated high risk (a parenteral controlled substance).    Assessment & Plan    55 yo female presents to the ED with L flank pain and dysuria.Labs and CT ab/pelvis ordered. Labs show urine consistent with infection. CT abd/pelvis shows:  1.  New tiny sand-like calyceal tip stone in the right kidney. A couple of tiny sand-like stones in the left kidney, improved since prior. No obstruction on either side.  2.  Normal appendix. No mechanical bowel obstruction, free gas or free fluid. Formed stool material within normal caliber colon.    I discussed the case with urology who  reviewed the case and are recommending discharge to home with ciprofloxacin and oxycodone and close follow up with them in the next 2-3 days. The patient was in agreement with this plan.    I have reviewed the nursing notes. I have reviewed the findings, diagnosis, plan and need for follow up with the patient.    Discharge Medication List as of 6/16/2025 12:45 AM        START taking these medications    Details   ciprofloxacin (CIPRO) 500 MG tablet Take 1 tablet (500 mg) by mouth 2 times daily., Disp-14 tablet, R-0, Local Print      oxyCODONE (ROXICODONE) 5 MG tablet Take 1 tablet (5 mg) by mouth every 6 hours as needed for severe pain., Disp-10 tablet, R-0, Local Print             Final diagnoses:   Pyelonephritis   Kidney stone   I, Michael Wong, am serving as a trained medical scribe to document services personally performed by Vahe Echavarria MD, based on the provider's statements to me.     I, Vahe Echavarria MD, was physically present and have reviewed and verified the accuracy of this note documented by Michael Wong.     Vahe Echavarria MD  [facility] EMERGENCY DEPARTMENT  6/15/2025     Vahe Echavarria MD  07/12/25 9610

## 2025-06-16 NOTE — TELEPHONE ENCOUNTER
Pt is calling about severe lower abdominal and back pain. Painful to go pee. Not urinating a lot, dribbling and pain. Staying hydrated, No appetite. Symptoms started on Thursday, 4 days ago. No fever. Back pain came on 3-4 hours ago.    Triage to ED, pt will go ED tonight.    Hebert Truong RN, BSN  6/15/2025 at 8:33 PM  [facility]        Reason for Disposition   [1] Unable to urinate (or only a few drops) > 4 hours AND [2] bladder feels very full (e.g., palpable bladder or strong urge to urinate)    Additional Information   Negative: Shock suspected (e.g., cold/pale/clammy skin, too weak to stand, low BP, rapid pulse)   Negative: Sounds like a life-threatening emergency to the triager   Negative: Followed a genital area injury (e.g., vagina, vulva)   Negative: Taking antibiotic for urinary tract infection (UTI)   Negative: Pregnant   Negative: Postpartum (from 0 to 6 weeks after delivery)    Protocols used: Urination Pain - Female-A-

## 2025-06-17 LAB — BACTERIA UR CULT: ABNORMAL

## 2025-06-18 ENCOUNTER — TELEPHONE (OUTPATIENT)
Dept: NURSING | Facility: CLINIC | Age: 55
End: 2025-06-18
Payer: COMMERCIAL

## 2025-06-18 NOTE — TELEPHONE ENCOUNTER
[facility] ([facility])    Reason for call: Lab Result Notification     Lab Result (including Rx patient on, if applicable).  If culture, copy of lab report at bottom.  Lab Result: Urine culture final  ED RX = ciprofloxacin (CIPRO) 500 MG tablet 2 times daily for 7 days.  Susceptible   Creatinine Level (mg/dl)   Creatinine   Date Value Ref Range Status   06/15/2025 0.84 0.51 - 0.95 mg/dL Final    Creatinine clearance (ml/min), if applicable    Serum creatinine: 0.84 mg/dL 06/15/25 2231  Estimated creatinine clearance: 77.9 mL/min     Patient's current Symptoms:   Dagoberto reports her back pain is gone, but some abdominal pain and burning with urination. She is also feeling nauseated but she has not taken the Zofran yet and has not taken the pain meds today. She is drinking fluids and agreed to follow up with urology. She also agreed to return to the ED is she feels worse.  ED symptoms = Left flank pain and dysuria  RN Recommendations/Instructions per [place] ED lab result protocol:   Continue antibiotic/medication as prescribed: Cipro    Patient/care giver notified to contact your PCP clinic or return to the Emergency department if your:  Symptoms return.  Symptoms do not improve after 3 days on antibiotic.  Symptoms do not resolve after completing antibiotic.  Symptoms worsen or other concerning symptoms.    Zonia Robbins RN

## 2025-06-30 ENCOUNTER — VIRTUAL VISIT (OUTPATIENT)
Dept: UROLOGY | Facility: CLINIC | Age: 55
End: 2025-06-30
Attending: EMERGENCY MEDICINE
Payer: COMMERCIAL

## 2025-06-30 ENCOUNTER — TELEPHONE (OUTPATIENT)
Dept: GASTROENTEROLOGY | Facility: CLINIC | Age: 55
End: 2025-06-30

## 2025-06-30 DIAGNOSIS — N20.0 NEPHROLITHIASIS: Primary | ICD-10-CM

## 2025-06-30 PROCEDURE — 98006 SYNCH AUDIO-VIDEO EST MOD 30: CPT | Performed by: NURSE PRACTITIONER

## 2025-06-30 PROCEDURE — 1126F AMNT PAIN NOTED NONE PRSNT: CPT | Mod: 95 | Performed by: NURSE PRACTITIONER

## 2025-06-30 NOTE — NURSING NOTE
PT c/o discomfort and burning.    Current patient location: Home     Is the patient currently in the state of [place]? YES    Visit mode: VIDEO    If the visit is dropped, the patient can be reconnected by:VIDEO VISIT: Text to cell phone:   Telephone Information:   Mobile [phone]       Will anyone else be joining the visit? NO  (If patient encounters technical issues they should call [phone]    Are changes needed to the allergy or medication list? No and Pt stated no med changes    Are refills needed on medications prescribed by this physician? NO    Rooming Documentation:  Not applicable    Reason for visit: Consult    Miranda MORAN

## 2025-06-30 NOTE — PATIENT INSTRUCTIONS
UROLOGY CLINIC VISIT PATIENT INSTRUCTIONS    It was a pleasure seeing you today! Thank you for giving us the opportunity to care for you. We hope we provided the excellent service you deserve and look forward to serving you again.    Instructions per today's visit: If having severe flank pain, fevers, chills, nausea, or vomiting please notify Urology clinic or be seen in the ER.       If you have any issues, questions, or concerns, please don't hesitate to contact us at [facility] at [phone] or via [employer].    Important Contact Information:  -To each our nurse triage line, please call our contact center at [phone].  -Our clinic hours are Monday through Friday, 8:00 a.m. - 4:30 p.m. Feel free to call during these hours with any questions.  -You can also contact us anytime via [employer], and we will respond during clinic hours.      Sravanthi Walker CNP  Department of Urology      DIET & LIFESTYLE CHANGES FOR PATIENTS WITH KIDNEY STONES    If you've had a kidney stone, you are likely to form another. Risk of recurrence is 15% at 1 year, 35% to 40% at 2 years, and 50% at 10 years. Therefore, prevention is very important.   These recommendations have shown to be effective.    CALCIUM STONES (Oxalate and Phosphate)    Fluid intake is the most important prevention measure to help prevent stones. Fluid intake should be at least 2.5 liters per day or 90-120oz per day. With goal of urine output of >2.5L per day.   Increasing liquids that have citric acid may help such as low calorie orange juice, lemonade (Crystal Light Lemonade or True Lemon/Lime), or adding a citrus to your water. Try to limit sugar, especially if you have diabetes.  You can add lemon juice or fresh vinay to your water daily.  Lemon juice increases the citrate in your urine, and helps decrease the formation of stone and even breakdown certain types of stones. Please use 1/2 cup of lemon juice per day.     Helpful Fluid  "Measurements:  1 Cup = 8oz  1 liter = 34oz  1 quart = 32oz  24 pack water: Each bottle 16.9 oz     Low Oxalate Diet: Limit your consumption of OXALATE-rich foods including:  Some nuts including peanuts, almonds, cashews, peanut butter, almond milk.   Okay to eat with kidney stones: walnuts, pecans, pistachios, and seeds (pumpkin or sunflower).   Spinach  Rhubarb  Beets  Chocolate-Dark chocolate or cocoa  Soybeans and soy products   Potatoes    Website:   [email or web address]    Below is a link to a PDF that is based on [employer] research. Please stick to pages 6-9 of this document. My suggestion is to review the list of food that is OK. The \"avoid\" list can be overwhelming.  [email or web address]_[email or web address]_[email or web address]    Low Sodium Diet: Salt (sodium chloride) is found in abundance in many foods. High sodium levels in the urine can interfere with the kidney's handling of calcium.   Trying a DASH (Dietary Approaches to Stop Hypertension) diet which is eating more fruits and vegetables, limiting salt intake, moderate in low-fat diary products, and low in animal protein.   Try to decrease salt intake to <2000 mg of sodium daily.     Tips for reducing the salt in your diet:  Don't use salt at the table  Reduce the salt used in food preparation. Try 1/2 teaspoon when recipes call for 1 teaspoon.  Use herbs and spices for flavoring instead of salt.  Avoid salty foods.  Check the label before you buy or use a product. Note sodium and portion size information.  Try to consume less than 2,000 mg/day. (1 teaspoon = 2,000 mg)    Foods with high sodium content include:  Processed meat (including luncheon meats, sausage)   Crackers   Instant cereal   Processed cheese   Canned soups   Chips and snack foods   Soy sauce    Low Animal Protein: Reduce animal protein (meat) intake to no more than 8-10 ounces per day. " Increase fruit and vegetables to 5 servings per day.    Maintain a normal calcium diet: Calcium rich foods are encouraged, but no more than 1000 - 1200 mg per day. Researches have found that people with low calcium intakes tend to have more stones. Foods with high calcium content are acceptable and include:  Calcium rich foods include:   Diary (cheese, milk, and yogurt)  Enriched cereals  Oatmeal  Meat and fish such as canned sardines, canned pink salmon with bones- look for low sodium options  Dark green vegetables: Broccoli, peas, chinese cabbage/bok samuel, Kale, mustard greens,  Alternative milks: Coconut, Rice, Flax, or Oat milk (avoid almond milk)  Calcium fortified Orange Juice- look for a low sugar/light variety  Pistachio nuts  Beans: mung beans, red kidney beans     Limit Vitamin C intake to < 1000 mg daily.      Vaginal Moisturizers and Lubricants    Please find a moisturizer and lubricant that fits your needs and you like the best. Please avoid any moisturizers/lubricants with fragrance of those made with glycerin.       Vaginal Moisturizers:  -Replens  -Vagisil Moisturizer  -Feminease  -Moist Again  -K-Y Liquibeads  -Hyalo GYN      Lubricants:  Use for sexual activity. Please only use water-based products.  -Astroglide  -Slippery Stuff  -K-Y Jelly

## 2025-06-30 NOTE — TELEPHONE ENCOUNTER
Pre visit planning completed.    Procedure details:    Patient scheduled for Upper endoscopy (EGD) with BRAVO capsule placement and Upper endoscopy (EGD) with Endoflip on 07/22/2025.     Arrival time: 1200. Procedure time 1330    Facility location: [facility]; [address] [place] [address], [address] Floor, [place] [address]. Check in location: Main entrance at registration desk.  *Disclaimer: Drivers are to check in with patient and stay on campus during procedure.     Sedation type: MAC    Pre op exam needed? No.    Indication for procedure:   K22.2 (ICD-10-CM) - Schatzki's ring   R12 (ICD-10-CM) - Heartburn   R13.19 (ICD-10-CM) - Esophageal dysphagia     Chart review:     Electronic implanted devices? No    Recent diagnosis of diverticulitis within the last 6 weeks? No    Medication review:    Diabetic? No    Anticoagulants? No    Weight loss medication/injectable? No GLP-1 medication per patient's medication list. Nursing to verify with pre-assessment call.    Other medication HOLDING recommendations:  BRAVO: Does patient have a nickel allergy? No nickel allergy listed. Nursing to verify during pre-assessment call   PPIs/Acid reducing medication(s): HOLD 2 weeks before procedure.    Prep for procedure:     Bowel prep recommendation: N/A  Due to: EGD, Endoflip, Bravo    Procedure information and instructions sent via Nanomechtray Valdovinos RN  Endoscopy Procedure Pre Assessment   [phone] option 3

## 2025-06-30 NOTE — PROGRESS NOTES
Urology Video Office Visit    Video-Visit Details    Type of service:  Video Visit    Video Start Time: 1532    Video End Time:1556    Originating Location (pt. Location): Home    Distant Location (provider location):  Off-site     Platform used for Video Visit: [employer]           Assessment and Plan:     Assessment: 54 year old female with history of nephrolithiasis.     Plan:  -Reviewed CT scan with patient. Noted bilateral punctate nonobstructing renal stones.   -The patient and I discussed the diagnosis and natural history of urolithiasis. We discussed some general measures to prevent recurrent kidney stones.  These include fluid intake to achieve 2.5 liters of urine per day, decreased salt intake, normal calcium intake, lowering animal protein intake, avoiding high amounts of oxalate containing foods, and increased citrate intake with orange juice/lemonade.  -If having severe flank pain, fevers, chills, nausea, or vomiting please notify Urology clinic or be seen in the ER.   -RTC in 1 year with TEVIN Walker CNP  Department of Urology  June 30, 2025    I spent a total of 25 minutes spent on the date of the encounter doing chart review, history and exam, documentation, and further activities as noted above.          Chief Complaint:   Nephrolithiasis         History of Present Illness:    Sharron Shipley is a pleasant 54 year old female who presents with concerns of a nephrolithiasis.     Ms Rodriguez was seen in the ED on 6/15/25 for concerns of pyelonephritis. Urine culture positive for 10-50k e.Coli.     CT scan on 6/15/25 (images personally reviewed) revealed bilateral nonobstructing punctate renal stones. No noted ureterolithiasis or hydronephrosis.     She is doing well at this time. Denies any flank pain, fevers, chills, nausea, vomiting, hematuria, or dysuria.     History of nephrolithiasis which she has mostly been able to pass on her own.     She is working hard at staying well-hydrated  with water.     History of full hysterectomy and concerns of recurrent UTI's.     Stone Risk Factors: None    She will drink about 64oz of fluid per day. Does like to eat almonds and cashews. Will use almond milk in her coffee.          Past Medical History:     Past Medical History:   Diagnosis Date    Cervical high risk HPV (human papillomavirus) test positive 2020 2021    High cholesterol     Kidney stone 9/22/2014    PONV (postoperative nausea and vomiting)     Ventricular tachycardia (H) 2/5/2025            Past Surgical History:     Past Surgical History:   Procedure Laterality Date    BIOPSY      ESOPHAGOSCOPY, GASTROSCOPY, DUODENOSCOPY (EGD), DILATATION, COMBINED N/A 05/10/2021    Procedure: ESOPHAGOGASTRODUODENOSCOPY, WITH DILATION of lower esophageal sphincter;  Surgeon: Valentin Taylor MD;  Location: [facility]    GI SURGERY      HYSTERECTOMY VAGINAL, BILATERAL SALPINGO-OOPHERECTOMY, COMBINED Bilateral 06/07/2022    Procedure: Transvaginal hysterectomy, bilateral salpingo-oophorectomy, cystoscopy ,Excision of vulvar Skin Tag;  Surgeon: Trini Schultz MD;  Location: [facility]            Medications     Current Outpatient Medications   Medication Sig Dispense Refill    atorvastatin (LIPITOR) 20 MG tablet Take 1 tablet (20 mg) by mouth daily. 90 tablet 4    ciprofloxacin (CIPRO) 500 MG tablet Take 1 tablet (500 mg) by mouth 2 times daily. 14 tablet 0    diltiazem ER COATED BEADS (CARDIZEM CD/CARTIA XT) 180 MG 24 hr capsule Take 1 capsule (180 mg) by mouth daily. 90 capsule 3    estradiol (ESTRACE) 0.1 MG/GM vaginal cream Place 2 g vaginally three times a week. 42.5 g 11    estradiol (VIVELLE-DOT) 0.1 MG/24HR bi-weekly patch Place 1 patch onto the skin twice a week. 24 patch 4    omeprazole (PRILOSEC) 20 MG DR capsule Take 2 capsules (40 mg) by mouth every evening.      ondansetron (ZOFRAN ODT) 4 MG ODT tab Take 1 tablet (4 mg) by mouth every 8 hours as needed for nausea or vomiting. 20 tablet 0     oxyBUTYnin ER (DITROPAN XL) 5 MG 24 hr tablet Take 1 tablet (5 mg) by mouth daily. 90 tablet 4    oxyCODONE (ROXICODONE) 5 MG tablet Take 1 tablet (5 mg) by mouth every 6 hours as needed for severe pain. 10 tablet 0    valACYclovir (VALTREX) 1000 mg tablet Take 1 tablet (1,000 mg) by mouth daily. 90 tablet 4    valACYclovir (VALTREX) 500 MG tablet Take 1 tablet daily by mouth as needed for flares 30 tablet 3     No current facility-administered medications for this visit.            Family History:     Family History   Problem Relation Age of Onset    Diabetes Mother     Hyperlipidemia Mother     Sleep Apnea Father     Thyroid Disease Father     Diabetes Son     Attention Deficit Disorder Son     Attention Deficit Disorder Daughter     Diabetes Son     Thyroid Disease Daughter             Social History:     Social History     Socioeconomic History    Marital status: Single     Spouse name: Not on file    Number of children: 4    Years of education: Not on file    Highest education level: Not on file   Occupational History    Occupation: Manager     Comment: [employer]   Tobacco Use    Smoking status: Never     Passive exposure: Never    Smokeless tobacco: Never   Vaping Use    Vaping status: Never Used   Substance and Sexual Activity    Alcohol use: Not Currently     Comment: rare    Drug use: Never    Sexual activity: Yes     Partners: Male     Birth control/protection: Post-menopausal   Other Topics Concern    Parent/sibling w/ CABG, MI or angioplasty before 65F 55M? No   Social History Narrative    Currently living with parents to help take care of them.       Social Drivers of Health     Financial Resource Strain: Low Risk  (5/3/2025)    Financial Resource Strain     Within the past 12 months, have you or your family members you live with been unable to get utilities (heat, electricity) when it was really needed?: No   Food Insecurity: Low Risk  (5/3/2025)    Food Insecurity     Within the past 12 months, did  you worry that your food would run out before you got money to buy more?: No     Within the past 12 months, did the food you bought just not last and you didn t have money to get more?: No   Transportation Needs: Low Risk  (5/3/2025)    Transportation Needs     Within the past 12 months, has lack of transportation kept you from medical appointments, getting your medicines, non-medical meetings or appointments, work, or from getting things that you need?: No   Physical Activity: Insufficiently Active (5/3/2025)    Exercise Vital Sign     Days of Exercise per Week: 5 days     Minutes of Exercise per Session: 20 min   Stress: No Stress Concern Present (5/3/2025)    [employer] [employer] - Occupational Stress Questionnaire     Feeling of Stress : Not at all   Social Connections: Unknown (5/3/2025)    Social Connection and Isolation Panel [NHANES]     Frequency of Communication with Friends and Family: Not on file     Frequency of Social Gatherings with Friends and Family: More than three times a week     Attends [employer] Services: Not on file     Active Member of Clubs or Organizations: Not on file     Attends Club or Organization Meetings: Not on file     Marital Status: Not on file   Interpersonal Safety: Low Risk  (5/8/2025)    Interpersonal Safety     Do you feel physically and emotionally safe where you currently live?: Yes     Within the past 12 months, have you been hit, slapped, kicked or otherwise physically hurt by someone?: No     Within the past 12 months, have you been humiliated or emotionally abused in other ways by your partner or ex-partner?: No   Housing Stability: Low Risk  (5/3/2025)    Housing Stability     Do you have housing? : Yes     Are you worried about losing your housing?: No            Allergies:   Patient has no known allergies.         Review of Systems:  From intake questionnaire   Negative 14 system review except as noted on HPI, nurse's note.         Physical Exam:    General Appearance: Well groomed, hygenic  Eyes: No redness, discharge  Respiratory: No cough, no respiratory distress or labored breathing  Musculoskeletal: Grossly normal, full range of motion in upper extremities, no gross deficits  Skin: No discoloration or apparent rashes  Neurologic - No tremors  Psychiatric - Alert and oriented  The rest of a comprehensive physical examination is deferred due to video visit restrictions        Labs:    I personally reviewed all applicable laboratory data and went over findings with patient  Significant for:    CBC RESULTS:  Recent Labs   Lab Test 06/15/25  2231 02/13/25  1436 02/05/25 1942 09/06/23  0959   WBC 5.7 5.0 7.2 3.8*   HGB 13.3 12.8 14.7 13.0    240 269 204        BMP RESULTS:  Recent Labs   Lab Test 06/15/25  2231 02/13/25  1436 02/06/25  0653 02/05/25 1942    141 139 143   POTASSIUM 3.5 4.4 4.1 3.5   CHLORIDE 103 106 107 106   CO2 26 28 24 25   ANIONGAP 9 7 8 12   * 98 109* 121*   BUN 6.8 8.4 9.1 9.6   CR 0.84 0.76 0.73 0.83   GFRESTIMATED 82 >90 >90 83   AMISH 8.5* 8.7* 8.3* 8.9       UA RESULTS:   Recent Labs   Lab Test 06/15/25  2211 05/08/25  1332 04/29/25  1045 03/20/25  1432   SG 1.024 1.020 1.020 1.025   URINEPH 6.0 6.0 8.5* 6.5   NITRITE Positive* Negative Negative Positive*   RBCU 4*  --  10-25* 5-10*   WBCU 32*  --  10-25* 25-50*       CALCIUM RESULTS  Lab Results   Component Value Date    AMISH 8.5 06/15/2025    AMISH 8.7 02/13/2025    AMISH 8.3 02/06/2025           Imaging:    I personally reviewed all applicable imaging and went over the below findings with patient.    Results for orders placed or performed during the hospital encounter of 06/15/25   CT Abdomen Pelvis w/o Contrast    Narrative    EXAM: CT ABDOMEN PELVIS W/O CONTRAST  LOCATION: [facility]  DATE: 6/15/2025    INDICATION: Flank and lower abdominal pain, rule out renal stone vs. hydro vs. appy.  COMPARISON: CT abdomen and  pelvis without IV contrast 3/1/2023.  TECHNIQUE: CT scan of the abdomen and pelvis was performed without IV contrast. Multiplanar reformats were obtained. Dose reduction techniques were used.  CONTRAST: None.    FINDINGS:   LOWER CHEST: Normal.    HEPATOBILIARY: No discrete lesion, calcified gallstones, biliary dilatation or adjacent inflammation.    PANCREAS: Normal.    SPLEEN: Normal splenic size. No discrete lesion. Small accessory splenule.    ADRENAL GLANDS: Normal.    KIDNEYS/BLADDER: On the right, new tiny sand-like calyceal tip stone in the upper aspect posteriorly (image 64, series 5, image 55, series 3). On the left, couple of tiny sand-like calyceal tip stones in the collecting system (images 57 and 69, images 54   and 55, series 3). No hydronephrosis or hydroureter on either side. Since 3/1/2023, stone burden on the left has improved. Partially distended urinary bladder.    BOWEL: Normal appendix. Scattered gas and formed stool material within normal caliber colon, without mechanical obstruction or free gas.    LYMPH NODES: No suspicious abdominopelvic adenopathy.    VASCULATURE: Mildly atherosclerotic normal caliber distal abdominal aorta measuring 1.8 x 1.8 cm (image 35, series 2). Normal caliber IVC.    PELVIC ORGANS: Stool material within the rectosigmoid. No adenopathy or free fluid. Left pelvic phlebolith.    MUSCULOSKELETAL: Anatomic alignment of the bones and joints. Minor degenerative changes involving the spine and joints of the pelvis.      Impression    IMPRESSION:   1.  New tiny sand-like calyceal tip stone in the right kidney. A couple of tiny sand-like stones in the left kidney, improved since prior. No obstruction on either side.    2.  Normal appendix. No mechanical bowel obstruction, free gas or free fluid. Formed stool material within normal caliber colon.

## 2025-07-01 NOTE — TELEPHONE ENCOUNTER
Attempted to contact patient in order to complete pre assessment questions.     No answer. Left message to return call to [phone] option 3.    Callback communication sent via [employer].    Sarahi Cr LPN

## 2025-07-01 NOTE — TELEPHONE ENCOUNTER
Pre assessment completed for upcoming procedure.   (Please see previous telephone encounter notes for complete details)    Patient returned call.       Procedure details:    Procedure date 7/22/25, arrival time 1200 and facility location reviewed.    Pre op exam needed? No.    Designated  policy reviewed and that site requests drivers to check in and stay on campus. Instructed to have someone stay 24  hours post procedure.   *Disclaimer - please notify the [facility] Op Manager with any  issues/concerns.    Medication review:    Medications reviewed. Please see supporting documentation below. Holding recommendations discussed (if applicable).   Patient denies GLP-1  PPIs/Acid reducing medication(s): HOLD 2 weeks before procedure.   Patient denies nickel allergy      Prep for procedure:    Procedure prep instructions reviewed.    8 hour CLD, NPO 2 hours prior to arrival time.   Reminded NPO 2 hours prior to arrival. NPO at 1000.      Any additional information needed:  N/A      Patient verbalized understanding and had no questions or concerns at this time.      Ovidio Collins RN  Endoscopy Procedure Pre Assessment   [phone] option 3

## 2025-07-22 ENCOUNTER — HOSPITAL ENCOUNTER (OUTPATIENT)
Facility: CLINIC | Age: 55
Discharge: HOME | End: 2025-07-22
Attending: INTERNAL MEDICINE | Admitting: INTERNAL MEDICINE
Payer: COMMERCIAL

## 2025-07-22 ENCOUNTER — ANESTHESIA EVENT (OUTPATIENT)
Dept: GASTROENTEROLOGY | Facility: CLINIC | Age: 55
End: 2025-07-22
Payer: COMMERCIAL

## 2025-07-22 ENCOUNTER — ANESTHESIA (OUTPATIENT)
Dept: GASTROENTEROLOGY | Facility: CLINIC | Age: 55
End: 2025-07-22
Payer: COMMERCIAL

## 2025-07-22 VITALS
DIASTOLIC BLOOD PRESSURE: 83 MMHG | TEMPERATURE: 98.1 F | OXYGEN SATURATION: 100 % | HEART RATE: 69 BPM | SYSTOLIC BLOOD PRESSURE: 124 MMHG | RESPIRATION RATE: 14 BRPM

## 2025-07-22 LAB — UPPER GI ENDOSCOPY: NORMAL

## 2025-07-22 PROCEDURE — 250N000009 HC RX 250: Performed by: NURSE ANESTHETIST, CERTIFIED REGISTERED

## 2025-07-22 PROCEDURE — 370N000017 HC ANESTHESIA TECHNICAL FEE, PER MIN: Performed by: INTERNAL MEDICINE

## 2025-07-22 PROCEDURE — 43249 ESOPH EGD DILATION <30 MM: CPT | Performed by: INTERNAL MEDICINE

## 2025-07-22 PROCEDURE — 258N000003 HC RX IP 258 OP 636: Performed by: NURSE ANESTHETIST, CERTIFIED REGISTERED

## 2025-07-22 PROCEDURE — 91035 G-ESOPH REFLX TST W/ELECTROD: CPT | Performed by: INTERNAL MEDICINE

## 2025-07-22 PROCEDURE — 250N000011 HC RX IP 250 OP 636: Performed by: NURSE ANESTHETIST, CERTIFIED REGISTERED

## 2025-07-22 RX ORDER — NALOXONE HYDROCHLORIDE 0.4 MG/ML
0.4 INJECTION, SOLUTION INTRAMUSCULAR; INTRAVENOUS; SUBCUTANEOUS
Status: DISCONTINUED | OUTPATIENT
Start: 2025-07-22 | End: 2025-07-22 | Stop reason: HOSPADM

## 2025-07-22 RX ORDER — OXYCODONE HYDROCHLORIDE 5 MG/1
5 TABLET ORAL
Status: DISCONTINUED | OUTPATIENT
Start: 2025-07-22 | End: 2025-07-22 | Stop reason: HOSPADM

## 2025-07-22 RX ORDER — ONDANSETRON 4 MG/1
4 TABLET, ORALLY DISINTEGRATING ORAL EVERY 6 HOURS PRN
Status: DISCONTINUED | OUTPATIENT
Start: 2025-07-22 | End: 2025-07-22 | Stop reason: HOSPADM

## 2025-07-22 RX ORDER — FLUMAZENIL 0.1 MG/ML
0.2 INJECTION, SOLUTION INTRAVENOUS
Status: DISCONTINUED | OUTPATIENT
Start: 2025-07-22 | End: 2025-07-22 | Stop reason: HOSPADM

## 2025-07-22 RX ORDER — PROPOFOL 10 MG/ML
INJECTION, EMULSION INTRAVENOUS CONTINUOUS PRN
Status: DISCONTINUED | OUTPATIENT
Start: 2025-07-22 | End: 2025-07-22

## 2025-07-22 RX ORDER — ONDANSETRON 2 MG/ML
4 INJECTION INTRAMUSCULAR; INTRAVENOUS EVERY 30 MIN PRN
Status: DISCONTINUED | OUTPATIENT
Start: 2025-07-22 | End: 2025-07-22 | Stop reason: HOSPADM

## 2025-07-22 RX ORDER — NALOXONE HYDROCHLORIDE 0.4 MG/ML
0.2 INJECTION, SOLUTION INTRAMUSCULAR; INTRAVENOUS; SUBCUTANEOUS
Status: DISCONTINUED | OUTPATIENT
Start: 2025-07-22 | End: 2025-07-22 | Stop reason: HOSPADM

## 2025-07-22 RX ORDER — ONDANSETRON 2 MG/ML
4 INJECTION INTRAMUSCULAR; INTRAVENOUS
Status: DISCONTINUED | OUTPATIENT
Start: 2025-07-22 | End: 2025-07-22 | Stop reason: HOSPADM

## 2025-07-22 RX ORDER — NALOXONE HYDROCHLORIDE 0.4 MG/ML
0.1 INJECTION, SOLUTION INTRAMUSCULAR; INTRAVENOUS; SUBCUTANEOUS
Status: DISCONTINUED | OUTPATIENT
Start: 2025-07-22 | End: 2025-07-22 | Stop reason: HOSPADM

## 2025-07-22 RX ORDER — LIDOCAINE HYDROCHLORIDE 20 MG/ML
INJECTION, SOLUTION INFILTRATION; PERINEURAL PRN
Status: DISCONTINUED | OUTPATIENT
Start: 2025-07-22 | End: 2025-07-22

## 2025-07-22 RX ORDER — DEXAMETHASONE SODIUM PHOSPHATE 4 MG/ML
4 INJECTION, SOLUTION INTRA-ARTICULAR; INTRALESIONAL; INTRAMUSCULAR; INTRAVENOUS; SOFT TISSUE
Status: DISCONTINUED | OUTPATIENT
Start: 2025-07-22 | End: 2025-07-22 | Stop reason: HOSPADM

## 2025-07-22 RX ORDER — SODIUM CHLORIDE, SODIUM LACTATE, POTASSIUM CHLORIDE, CALCIUM CHLORIDE 600; 310; 30; 20 MG/100ML; MG/100ML; MG/100ML; MG/100ML
INJECTION, SOLUTION INTRAVENOUS CONTINUOUS PRN
Status: DISCONTINUED | OUTPATIENT
Start: 2025-07-22 | End: 2025-07-22

## 2025-07-22 RX ORDER — LIDOCAINE 40 MG/G
CREAM TOPICAL
Status: DISCONTINUED | OUTPATIENT
Start: 2025-07-22 | End: 2025-07-22 | Stop reason: HOSPADM

## 2025-07-22 RX ORDER — OXYCODONE HYDROCHLORIDE 10 MG/1
10 TABLET ORAL
Status: DISCONTINUED | OUTPATIENT
Start: 2025-07-22 | End: 2025-07-22 | Stop reason: HOSPADM

## 2025-07-22 RX ORDER — ONDANSETRON 2 MG/ML
4 INJECTION INTRAMUSCULAR; INTRAVENOUS EVERY 6 HOURS PRN
Status: DISCONTINUED | OUTPATIENT
Start: 2025-07-22 | End: 2025-07-22 | Stop reason: HOSPADM

## 2025-07-22 RX ORDER — ONDANSETRON 4 MG/1
4 TABLET, ORALLY DISINTEGRATING ORAL EVERY 30 MIN PRN
Status: DISCONTINUED | OUTPATIENT
Start: 2025-07-22 | End: 2025-07-22 | Stop reason: HOSPADM

## 2025-07-22 RX ORDER — SODIUM CHLORIDE, SODIUM LACTATE, POTASSIUM CHLORIDE, CALCIUM CHLORIDE 600; 310; 30; 20 MG/100ML; MG/100ML; MG/100ML; MG/100ML
INJECTION, SOLUTION INTRAVENOUS CONTINUOUS
Status: DISCONTINUED | OUTPATIENT
Start: 2025-07-22 | End: 2025-07-22 | Stop reason: HOSPADM

## 2025-07-22 RX ORDER — PROCHLORPERAZINE MALEATE 5 MG/1
10 TABLET ORAL EVERY 6 HOURS PRN
Status: DISCONTINUED | OUTPATIENT
Start: 2025-07-22 | End: 2025-07-22 | Stop reason: HOSPADM

## 2025-07-22 RX ORDER — PROPOFOL 10 MG/ML
INJECTION, EMULSION INTRAVENOUS PRN
Status: DISCONTINUED | OUTPATIENT
Start: 2025-07-22 | End: 2025-07-22

## 2025-07-22 RX ADMIN — LIDOCAINE HYDROCHLORIDE 100 MG: 20 INJECTION, SOLUTION INFILTRATION; PERINEURAL at 14:00

## 2025-07-22 RX ADMIN — SODIUM CHLORIDE, SODIUM LACTATE, POTASSIUM CHLORIDE, AND CALCIUM CHLORIDE: .6; .31; .03; .02 INJECTION, SOLUTION INTRAVENOUS at 13:55

## 2025-07-22 RX ADMIN — PROPOFOL 30 MG: 10 INJECTION, EMULSION INTRAVENOUS at 14:12

## 2025-07-22 RX ADMIN — PROPOFOL 40 MG: 10 INJECTION, EMULSION INTRAVENOUS at 14:00

## 2025-07-22 RX ADMIN — PROPOFOL 150 MCG/KG/MIN: 10 INJECTION, EMULSION INTRAVENOUS at 13:58

## 2025-07-22 ASSESSMENT — ACTIVITIES OF DAILY LIVING (ADL)
ADLS_ACUITY_SCORE: 50

## 2025-07-22 NOTE — H&P
Sharron Shipley  [id number]  female  54 year old      Reason for procedure/surgery: egd, reflux, wireless pH and dysphagia    Patient Active Problem List   Diagnosis    Mild major depression    Anxiety    Recurrent cold sores    Restless leg syndrome    Cervical high risk HPV (human papillomavirus) test positive    High cholesterol    Heartburn    H/O total hysterectomy    Symptomatic menopausal or female climacteric states    Female stress incontinence    Urge incontinence of urine    Vaginal dryness, menopausal    Schatzki's ring    Ventricular tachycardia (H)    Atrial fibrillation with rapid ventricular response (H)    Esophageal dysphagia    Regurgitation of food    Non-cardiac chest pain       Past Surgical History:    Past Surgical History:   Procedure Laterality Date    BIOPSY      ESOPHAGOSCOPY, GASTROSCOPY, DUODENOSCOPY (EGD), DILATATION, COMBINED N/A 05/10/2021    Procedure: ESOPHAGOGASTRODUODENOSCOPY, WITH DILATION of lower esophageal sphincter;  Surgeon: Valentin Taylor MD;  Location: [facility]    GI SURGERY      HYSTERECTOMY VAGINAL, BILATERAL SALPINGO-OOPHERECTOMY, COMBINED Bilateral 06/07/2022    Procedure: Transvaginal hysterectomy, bilateral salpingo-oophorectomy, cystoscopy ,Excision of vulvar Skin Tag;  Surgeon: Trini Schultz MD;  Location: [facility]       Past Medical History:   Past Medical History:   Diagnosis Date    Cervical high risk HPV (human papillomavirus) test positive 2020 2021    High cholesterol     Kidney stone 9/22/2014    PONV (postoperative nausea and vomiting)     Ventricular tachycardia (H) 2/5/2025       Social History:   Social History     Tobacco Use    Smoking status: Never     Passive exposure: Never    Smokeless tobacco: Never   Substance Use Topics    Alcohol use: Not Currently     Comment: rare       Family History:   Family History   Problem Relation Age of Onset    Diabetes Mother     Hyperlipidemia Mother     Sleep Apnea Father     Thyroid Disease Father      Diabetes Son     Attention Deficit Disorder Son     Attention Deficit Disorder Daughter     Diabetes Son     Thyroid Disease Daughter        Allergies: No Known Allergies    Active Medications:   No current outpatient medications on file.       Systemic Review:   CONSTITUTIONAL: NEGATIVE for fever, chills, change in weight  ENT/MOUTH: NEGATIVE for ear, mouth and throat problems  RESP: NEGATIVE for significant cough or SOB  CV: NEGATIVE for chest pain, palpitations or peripheral edema    Physical Examination:   Vital Signs: /74 (BP Location: Left arm)   Pulse 69   Temp 98.1  F (36.7  C) (Oral)   Resp 16   LMP  (LMP Unknown)   SpO2 97%   GENERAL: healthy, alert and no distress  NECK: no adenopathy, no asymmetry, masses, or scars  RESP: lungs clear to auscultation - no rales, rhonchi or wheezes  CV: regular rate and rhythm, normal S1 S2, no S3 or S4, no murmur, click or rub, no peripheral edema and peripheral pulses strong  ABDOMEN: soft, nontender, no hepatosplenomegaly, no masses and bowel sounds normal  MS: no gross musculoskeletal defects noted, no edema    I examined patient s dentition and informed the patient that dental injuries are a risk of any anesthetic management. No concerns were noted with this patient's dentition. . The patient has consented to proceed with the procedure.    Plan: Appropriate to proceed as scheduled.      Alan Burgos DO  7/22/2025    PCP:  Michael Tejada

## 2025-07-22 NOTE — ANESTHESIA CARE TRANSFER NOTE
Patient: Sharron Shipley    Procedure: Procedure(s):  Esophagoscopy, Gastroscopy, Duodenoscopy, Gastroesophageal Reflux Test with Mucosal PH Electrode  ESOPHAGOGASTRODUODENOSCOPY, WITH BALLOON DILATION OF LESS THAN 30 MILLIMETERS       Diagnosis: Schatzki's ring [K22.2]  Heartburn [R12]  Esophageal dysphagia [R13.19]  Diagnosis Additional Information: No value filed.    Anesthesia Type:   MAC     Note:    Oropharynx: oropharynx clear of all foreign objects and spontaneously breathing  Level of Consciousness: awake and drowsy  Oxygen Supplementation: room air    Independent Airway: airway patency satisfactory and stable  Dentition: dentition unchanged  Vital Signs Stable: post-procedure vital signs reviewed and stable  Report to RN Given: handoff report given  Patient transferred to: Phase II  Comments: Pt to recovery.  Spontaneous respirations and exchanging well.  Report given to RN.      Handoff Report: Identifed the Patient, Identified the Reponsible Provider, Reviewed the pertinent medical history, Discussed the surgical course, Reviewed Intra-OP anesthesia mangement and issues during anesthesia, Set expectations for post-procedure period and Allowed opportunity for questions and acknowledgement of understanding      Vitals:  Vitals Value Taken Time   /75 07/22/25 14:18   Temp     Pulse 82    Resp 14    SpO2 96 % 07/22/25 14:19   Vitals shown include unfiled device data.    Electronically Signed By: PACHECO Peace CRNA  July 22, 2025  2:19 PM

## 2025-07-22 NOTE — OR NURSING
Pt underwent EGD with BRAVO placement and esophageal balloon dilation under MAC. Specimens: none. Pt transferred to recovery and report given to endo RN.       Pauline Burns RN

## 2025-07-22 NOTE — ANESTHESIA PREPROCEDURE EVALUATION
Anesthesia Pre-Procedure Evaluation    Patient: Sharron Shiplye   MRN: [id number] : 1970          Procedure : Procedure(s):  Esophagoscopy, Gastroscopy, Duodenoscopy, Gastroesophageal Reflux Test with Mucosal PH Electrode         Past Medical History:   Diagnosis Date    Cervical high risk HPV (human papillomavirus) test positive 2020    High cholesterol     Kidney stone 2014    PONV (postoperative nausea and vomiting)     Ventricular tachycardia (H) 2025      Past Surgical History:   Procedure Laterality Date    BIOPSY      ESOPHAGOSCOPY, GASTROSCOPY, DUODENOSCOPY (EGD), DILATATION, COMBINED N/A 05/10/2021    Procedure: ESOPHAGOGASTRODUODENOSCOPY, WITH DILATION of lower esophageal sphincter;  Surgeon: Valentin Taylor MD;  Location: [facility]    GI SURGERY      HYSTERECTOMY VAGINAL, BILATERAL SALPINGO-OOPHERECTOMY, COMBINED Bilateral 2022    Procedure: Transvaginal hysterectomy, bilateral salpingo-oophorectomy, cystoscopy ,Excision of vulvar Skin Tag;  Surgeon: Trini Schultz MD;  Location: [facility]      No Known Allergies   Social History     Tobacco Use    Smoking status: Never     Passive exposure: Never    Smokeless tobacco: Never   Substance Use Topics    Alcohol use: Not Currently     Comment: rare      Wt Readings from Last 1 Encounters:   06/15/25 77.5 kg (170 lb 14.4 oz)        Anesthesia Evaluation   Pt has had prior anesthetic.     History of anesthetic complications  - PONV.      ROS/MED HX  ENT/Pulmonary:       Neurologic:       Cardiovascular:       METS/Exercise Tolerance:     Hematologic:       Musculoskeletal:       GI/Hepatic:     (+) GERD,                   Renal/Genitourinary:     (+)       Nephrolithiasis ,       Endo:       Psychiatric/Substance Use:     (+) psychiatric history anxiety       Infectious Disease:       Malignancy:       Other:              Physical Exam  Airway  Mallampati: II  TM distance: >3 FB  Neck ROM: full  Mouth opening: >= 4  "cm    Cardiovascular   Rhythm: regular  Rate: normal rate     Dental Comments: wnl      Pulmonary Breath sounds clear to auscultation        Neurological   She appears awake, alert and oriented x3.    Other Findings       OUTSIDE LABS:  CBC:   Lab Results   Component Value Date    WBC 5.7 06/15/2025    WBC 5.0 02/13/2025    HGB 13.3 06/15/2025    HGB 12.8 02/13/2025    HCT 38.7 06/15/2025    HCT 38.7 02/13/2025     06/15/2025     02/13/2025     BMP:   Lab Results   Component Value Date     06/15/2025     02/13/2025    POTASSIUM 3.5 06/15/2025    POTASSIUM 4.4 02/13/2025    CHLORIDE 103 06/15/2025    CHLORIDE 106 02/13/2025    CO2 26 06/15/2025    CO2 28 02/13/2025    BUN 6.8 06/15/2025    BUN 8.4 02/13/2025    CR 0.84 06/15/2025    CR 0.76 02/13/2025     (H) 06/15/2025    GLC 98 02/13/2025     COAGS: No results found for: \"PTT\", \"INR\", \"FIBR\"  POC:   Lab Results   Component Value Date    HCG Negative 06/07/2022     HEPATIC:   Lab Results   Component Value Date    ALBUMIN 4.1 06/15/2025    PROTTOTAL 6.6 06/15/2025    ALT 15 06/15/2025    AST 18 06/15/2025    ALKPHOS 75 06/15/2025    BILITOTAL 0.8 06/15/2025     OTHER:   Lab Results   Component Value Date    A1C 5.2 03/21/2024    AMISH 8.5 (L) 06/15/2025    MAG 1.8 02/05/2025    LIPASE 17 06/15/2025    TSH 6.60 (H) 02/05/2025    T4 1.12 02/05/2025       Anesthesia Plan    ASA Status:  2      NPO Status: NPO Appropriate   Anesthesia Type: MAC.  Airway: natural airway.   Techniques and Equipment:       - Monitoring Plan: standard ASA monitoring     Consents    Anesthesia Plan(s) and associated risks, benefits, and realistic alternatives discussed. Questions answered and patient/representative(s) expressed understanding.     - Discussed:     - Discussed with:  Patient, family               Postoperative Care         Comments:    Other Comments: NPO. Meds reviewed. No recent URI. No CP or SOB. PONV in past.               Eileen Chino, " MD MIRZA have reviewed the pertinent notes and labs in the chart from the past 30 days and (re)examined the patient.  Any updates or changes from those notes are reflected in this note.    Clinically Significant Risk Factors Present on Admission

## 2025-07-24 NOTE — ANESTHESIA POSTPROCEDURE EVALUATION
Patient: Sharron Shipley    Procedure: Procedure(s):  Esophagoscopy, Gastroscopy, Duodenoscopy, Gastroesophageal Reflux Test with Mucosal PH Electrode  ESOPHAGOGASTRODUODENOSCOPY, WITH BALLOON DILATION OF LESS THAN 30 MILLIMETERS       Anesthesia Type:  MAC    Note:  Disposition: Outpatient   Postop Pain Control: Uneventful            Sign Out: Well controlled pain   PONV: No   Neuro/Psych: Uneventful            Sign Out: Acceptable/Baseline neuro status   Airway/Respiratory: Uneventful            Sign Out: Acceptable/Baseline resp. status   CV/Hemodynamics: Uneventful            Sign Out: Acceptable CV status; No obvious hypovolemia; No obvious fluid overload   Other NRE: NONE   DID A NON-ROUTINE EVENT OCCUR?            Last vitals:  Vitals Value Taken Time   /83 07/22/25 14:50   Temp     Pulse     Resp 14 07/22/25 14:50   SpO2 100 % 07/22/25 14:50       Electronically Signed By: Juan Walker MD  July 24, 2025  7:11 AM

## 2025-07-29 ENCOUNTER — DOCUMENTATION ONLY (OUTPATIENT)
Dept: GASTROENTEROLOGY | Facility: CLINIC | Age: 55
End: 2025-07-29
Payer: COMMERCIAL

## 2025-07-29 DIAGNOSIS — R12 HEARTBURN: Primary | ICD-10-CM

## 2025-08-18 ENCOUNTER — TELEPHONE (OUTPATIENT)
Dept: ORTHOPEDICS | Facility: CLINIC | Age: 55
End: 2025-08-18

## 2025-08-18 ENCOUNTER — APPOINTMENT (OUTPATIENT)
Dept: GENERAL RADIOLOGY | Facility: CLINIC | Age: 55
End: 2025-08-18
Attending: EMERGENCY MEDICINE
Payer: COMMERCIAL

## 2025-08-18 ENCOUNTER — HOSPITAL ENCOUNTER (EMERGENCY)
Facility: CLINIC | Age: 55
Discharge: HOME | End: 2025-08-18
Attending: EMERGENCY MEDICINE | Admitting: EMERGENCY MEDICINE
Payer: COMMERCIAL

## 2025-08-18 PROCEDURE — 73610 X-RAY EXAM OF ANKLE: CPT | Mod: RT

## 2025-08-18 ASSESSMENT — ACTIVITIES OF DAILY LIVING (ADL)
ADLS_ACUITY_SCORE: 50
ADLS_ACUITY_SCORE: 50

## 2025-08-18 ASSESSMENT — ENCOUNTER SYMPTOMS
NUMBNESS: 0
ARTHRALGIAS: 1
NAUSEA: 0
FEVER: 0
WEAKNESS: 0
CHILLS: 0
VOMITING: 0

## 2025-08-19 ENCOUNTER — OFFICE VISIT (OUTPATIENT)
Dept: FAMILY MEDICINE | Facility: CLINIC | Age: 55
End: 2025-08-19
Payer: COMMERCIAL

## 2025-08-19 ENCOUNTER — OFFICE VISIT (OUTPATIENT)
Dept: ORTHOPEDICS | Facility: CLINIC | Age: 55
End: 2025-08-19
Payer: COMMERCIAL

## 2025-08-19 ENCOUNTER — TELEPHONE (OUTPATIENT)
Dept: ORTHOPEDICS | Facility: CLINIC | Age: 55
End: 2025-08-19

## 2025-08-19 ENCOUNTER — PRE VISIT (OUTPATIENT)
Dept: ORTHOPEDICS | Facility: CLINIC | Age: 55
End: 2025-08-19

## 2025-08-19 VITALS
TEMPERATURE: 97.6 F | OXYGEN SATURATION: 99 % | BODY MASS INDEX: 27.66 KG/M2 | SYSTOLIC BLOOD PRESSURE: 121 MMHG | RESPIRATION RATE: 16 BRPM | DIASTOLIC BLOOD PRESSURE: 73 MMHG | HEART RATE: 66 BPM | HEIGHT: 65 IN | WEIGHT: 166 LBS

## 2025-08-19 DIAGNOSIS — R13.19 ESOPHAGEAL DYSPHAGIA: ICD-10-CM

## 2025-08-19 DIAGNOSIS — I48.91 ATRIAL FIBRILLATION WITH RAPID VENTRICULAR RESPONSE (H): ICD-10-CM

## 2025-08-19 DIAGNOSIS — N39.41 URGE INCONTINENCE OF URINE: ICD-10-CM

## 2025-08-19 DIAGNOSIS — Z01.818 PREOP GENERAL PHYSICAL EXAM: Primary | ICD-10-CM

## 2025-08-19 DIAGNOSIS — S82.831A OTHER CLOSED FRACTURE OF DISTAL END OF RIGHT FIBULA, INITIAL ENCOUNTER: ICD-10-CM

## 2025-08-19 DIAGNOSIS — B00.1 RECURRENT COLD SORES: ICD-10-CM

## 2025-08-19 DIAGNOSIS — S82.61XD CLOSED FRACTURE OF DISTAL LATERAL MALLEOLUS OF RIGHT FIBULA WITH ROUTINE HEALING, SUBSEQUENT ENCOUNTER: ICD-10-CM

## 2025-08-19 LAB
ERYTHROCYTE [DISTWIDTH] IN BLOOD BY AUTOMATED COUNT: 12.5 % (ref 10–15)
HCT VFR BLD AUTO: 38.7 % (ref 35–47)
HGB BLD-MCNC: 12.9 G/DL (ref 11.7–15.7)
MCH RBC QN AUTO: 30.9 PG (ref 26.5–33)
MCHC RBC AUTO-ENTMCNC: 33.3 G/DL (ref 31.5–36.5)
MCV RBC AUTO: 92.6 FL (ref 78–100)
PLATELET # BLD AUTO: 201 10E3/UL (ref 150–450)
RBC # BLD AUTO: 4.18 10E6/UL (ref 3.8–5.2)
WBC # BLD AUTO: 5.87 10E3/UL (ref 4–11)

## 2025-08-19 PROCEDURE — 85027 COMPLETE CBC AUTOMATED: CPT | Performed by: PHYSICIAN ASSISTANT

## 2025-08-19 PROCEDURE — 3078F DIAST BP <80 MM HG: CPT | Performed by: PHYSICIAN ASSISTANT

## 2025-08-19 PROCEDURE — 36415 COLL VENOUS BLD VENIPUNCTURE: CPT | Performed by: PHYSICIAN ASSISTANT

## 2025-08-19 PROCEDURE — 99214 OFFICE O/P EST MOD 30 MIN: CPT | Performed by: PHYSICIAN ASSISTANT

## 2025-08-19 PROCEDURE — 3074F SYST BP LT 130 MM HG: CPT | Performed by: PHYSICIAN ASSISTANT

## 2025-08-20 ENCOUNTER — PATIENT OUTREACH (OUTPATIENT)
Dept: CARE COORDINATION | Facility: CLINIC | Age: 55
End: 2025-08-20
Payer: COMMERCIAL

## 2025-08-26 ENCOUNTER — DOCUMENTATION ONLY (OUTPATIENT)
Dept: ORTHOPEDICS | Facility: CLINIC | Age: 55
End: 2025-08-26
Payer: COMMERCIAL

## 2025-09-04 ENCOUNTER — OFFICE VISIT (OUTPATIENT)
Dept: ORTHOPEDICS | Facility: CLINIC | Age: 55
End: 2025-09-04
Payer: COMMERCIAL

## 2025-09-04 DIAGNOSIS — S82.831A OTHER CLOSED FRACTURE OF DISTAL END OF RIGHT FIBULA, INITIAL ENCOUNTER: Primary | ICD-10-CM

## (undated) DEVICE — GLOVE PROTEXIS BLUE W/NEU-THERA 7.0  2D73EB70

## (undated) DEVICE — SUCTION MANIFOLD NEPTUNE 2 SYS 1 PORT 702-025-000

## (undated) DEVICE — PREP CHLORHEXIDINE 4% 4OZ (HIBICLENS) 57504

## (undated) DEVICE — GOWN LG DISP 9515

## (undated) DEVICE — SOL NACL 0.9% IRRIG 1000ML BOTTLE 07138-09

## (undated) DEVICE — SOL WATER IRRIG 500ML BOTTLE 2F7113

## (undated) DEVICE — SYR 30ML SLIP TIP W/O NDL 302833

## (undated) DEVICE — SOL WATER IRRIG 1000ML BOTTLE 07139-09

## (undated) DEVICE — SU VICRYL 0 CT-2 CR 8X18" J727D

## (undated) DEVICE — GOWN IMPERVIOUS 2XL BLUE

## (undated) DEVICE — STOCKING SLEEVE COMPRESSION CALF MED

## (undated) DEVICE — ENDO BITE BLOCK ADULT OMNI-BLOC

## (undated) DEVICE — GLOVE EXAM NITRILE LG PF LATEX FREE 5064

## (undated) DEVICE — GLOVE PROTEXIS W/NEU-THERA 7.0  2D73TE70

## (undated) DEVICE — PAD PERI INDIV WRAP 11" 2022

## (undated) DEVICE — SU VICRYL 0 CT-1 36" J946H

## (undated) DEVICE — SUCTION CATH AIRLIFE TRI-FLO W/CONTROL PORT 14FR  T60C

## (undated) DEVICE — PACK LAPAROSCOPY/PELVISCOPY STD

## (undated) DEVICE — ESU PENCIL W/COATED BLADE E2450H

## (undated) DEVICE — TUBING SUCTION MEDI-VAC 1/4"X20' N620A

## (undated) DEVICE — SUCTION TIP YANKAUER STR K87

## (undated) DEVICE — KIT ENDO TURNOVER/PROCEDURE CARRY-ON 101822

## (undated) DEVICE — ENDO FORCEP BX CAPTURA PRO SPIKE G50696

## (undated) DEVICE — TUBING CYSTO/BLADDER IRRIG SET 80" 06544-01

## (undated) DEVICE — CATH TRAY FOLEY SURESTEP 16FR W/URINE MTR STATLK LF A303416A

## (undated) DEVICE — Device

## (undated) DEVICE — TUBING SUCTION 12"X1/4" N612

## (undated) DEVICE — SPECIMEN CONTAINER 3OZ W/FORMALIN 59901

## (undated) DEVICE — DECANTER VIAL 2006S

## (undated) RX ORDER — PROPOFOL 10 MG/ML
INJECTION, EMULSION INTRAVENOUS
Status: DISPENSED
Start: 2022-06-07

## (undated) RX ORDER — ONDANSETRON 2 MG/ML
INJECTION INTRAMUSCULAR; INTRAVENOUS
Status: DISPENSED
Start: 2022-06-07

## (undated) RX ORDER — PROPOFOL 10 MG/ML
INJECTION, EMULSION INTRAVENOUS
Status: DISPENSED
Start: 2021-05-10

## (undated) RX ORDER — FENTANYL CITRATE 50 UG/ML
INJECTION, SOLUTION INTRAMUSCULAR; INTRAVENOUS
Status: DISPENSED
Start: 2022-06-07

## (undated) RX ORDER — LIDOCAINE HYDROCHLORIDE 10 MG/ML
INJECTION, SOLUTION EPIDURAL; INFILTRATION; INTRACAUDAL; PERINEURAL
Status: DISPENSED
Start: 2021-05-10

## (undated) RX ORDER — LIDOCAINE HYDROCHLORIDE AND EPINEPHRINE 10; 10 MG/ML; UG/ML
INJECTION, SOLUTION INFILTRATION; PERINEURAL
Status: DISPENSED
Start: 2022-06-07

## (undated) RX ORDER — LIDOCAINE HYDROCHLORIDE 10 MG/ML
INJECTION, SOLUTION EPIDURAL; INFILTRATION; INTRACAUDAL; PERINEURAL
Status: DISPENSED
Start: 2022-06-07

## (undated) RX ORDER — OXYCODONE HYDROCHLORIDE 5 MG/1
TABLET ORAL
Status: DISPENSED
Start: 2022-06-07

## (undated) RX ORDER — TRANEXAMIC ACID 10 MG/ML
INJECTION, SOLUTION INTRAVENOUS
Status: DISPENSED
Start: 2022-06-07

## (undated) RX ORDER — ACETAMINOPHEN 325 MG/1
TABLET ORAL
Status: DISPENSED
Start: 2022-06-07

## (undated) RX ORDER — DEXAMETHASONE SODIUM PHOSPHATE 4 MG/ML
INJECTION, SOLUTION INTRA-ARTICULAR; INTRALESIONAL; INTRAMUSCULAR; INTRAVENOUS; SOFT TISSUE
Status: DISPENSED
Start: 2022-06-07

## (undated) RX ORDER — LIDOCAINE HYDROCHLORIDE 10 MG/ML
INJECTION, SOLUTION EPIDURAL; INFILTRATION; INTRACAUDAL; PERINEURAL
Status: DISPENSED
Start: 2023-04-25

## (undated) RX ORDER — PHENAZOPYRIDINE HYDROCHLORIDE 200 MG/1
TABLET, FILM COATED ORAL
Status: DISPENSED
Start: 2022-06-07

## (undated) RX ORDER — CEFAZOLIN SODIUM/WATER 2 G/20 ML
SYRINGE (ML) INTRAVENOUS
Status: DISPENSED
Start: 2022-06-07

## (undated) RX ORDER — ONDANSETRON 4 MG/1
TABLET, ORALLY DISINTEGRATING ORAL
Status: DISPENSED
Start: 2023-04-25

## (undated) RX ORDER — GABAPENTIN 300 MG/1
CAPSULE ORAL
Status: DISPENSED
Start: 2022-06-07